# Patient Record
Sex: MALE | Race: WHITE | NOT HISPANIC OR LATINO | Employment: FULL TIME | URBAN - METROPOLITAN AREA
[De-identification: names, ages, dates, MRNs, and addresses within clinical notes are randomized per-mention and may not be internally consistent; named-entity substitution may affect disease eponyms.]

---

## 2017-06-25 ENCOUNTER — OFFICE VISIT (OUTPATIENT)
Dept: URGENT CARE | Facility: CLINIC | Age: 47
End: 2017-06-25
Payer: COMMERCIAL

## 2017-06-25 PROCEDURE — 99203 OFFICE O/P NEW LOW 30 MIN: CPT

## 2021-04-13 DIAGNOSIS — Z23 ENCOUNTER FOR IMMUNIZATION: ICD-10-CM

## 2023-10-24 ENCOUNTER — OFFICE VISIT (OUTPATIENT)
Dept: OBGYN CLINIC | Facility: MEDICAL CENTER | Age: 53
End: 2023-10-24
Payer: COMMERCIAL

## 2023-10-24 VITALS
HEART RATE: 80 BPM | WEIGHT: 289.8 LBS | SYSTOLIC BLOOD PRESSURE: 125 MMHG | DIASTOLIC BLOOD PRESSURE: 73 MMHG | BODY MASS INDEX: 40.57 KG/M2 | HEIGHT: 71 IN

## 2023-10-24 DIAGNOSIS — M16.12 PRIMARY OSTEOARTHRITIS OF LEFT HIP: Primary | ICD-10-CM

## 2023-10-24 PROCEDURE — 99214 OFFICE O/P EST MOD 30 MIN: CPT | Performed by: STUDENT IN AN ORGANIZED HEALTH CARE EDUCATION/TRAINING PROGRAM

## 2023-10-24 RX ORDER — ACETAMINOPHEN 500 MG
500 TABLET ORAL EVERY 6 HOURS PRN
COMMUNITY

## 2023-10-24 RX ORDER — GABAPENTIN 300 MG/1
300 CAPSULE ORAL ONCE
OUTPATIENT
Start: 2023-10-24 | End: 2023-10-24

## 2023-10-24 RX ORDER — MELATONIN
1000 DAILY
Qty: 60 TABLET | Refills: 1 | Status: SHIPPED | OUTPATIENT
Start: 2023-10-24

## 2023-10-24 RX ORDER — SODIUM CHLORIDE, SODIUM LACTATE, POTASSIUM CHLORIDE, CALCIUM CHLORIDE 600; 310; 30; 20 MG/100ML; MG/100ML; MG/100ML; MG/100ML
125 INJECTION, SOLUTION INTRAVENOUS CONTINUOUS
OUTPATIENT
Start: 2023-10-24

## 2023-10-24 RX ORDER — TRANEXAMIC ACID 10 MG/ML
1000 INJECTION, SOLUTION INTRAVENOUS ONCE
OUTPATIENT
Start: 2023-10-24 | End: 2023-10-24

## 2023-10-24 RX ORDER — CHLORHEXIDINE GLUCONATE ORAL RINSE 1.2 MG/ML
15 SOLUTION DENTAL ONCE
OUTPATIENT
Start: 2023-10-24 | End: 2023-10-24

## 2023-10-24 RX ORDER — MULTIVIT-MIN/IRON FUM/FOLIC AC 7.5 MG-4
1 TABLET ORAL DAILY
Qty: 30 TABLET | Refills: 1 | Status: SHIPPED | OUTPATIENT
Start: 2023-10-24

## 2023-10-24 RX ORDER — ALBUTEROL SULFATE 2.5 MG/3ML
SOLUTION RESPIRATORY (INHALATION)
COMMUNITY

## 2023-10-24 RX ORDER — DIPHENHYDRAMINE HCL 25 MG
25 CAPSULE ORAL EVERY 6 HOURS PRN
COMMUNITY

## 2023-10-24 RX ORDER — CEFAZOLIN SODIUM 2 G/50ML
2000 SOLUTION INTRAVENOUS ONCE
OUTPATIENT
Start: 2023-10-24 | End: 2023-10-24

## 2023-10-24 RX ORDER — ASCORBIC ACID 500 MG
500 TABLET ORAL 2 TIMES DAILY
Qty: 60 TABLET | Refills: 1 | Status: SHIPPED | OUTPATIENT
Start: 2023-10-24

## 2023-10-24 RX ORDER — CHLORHEXIDINE GLUCONATE 4 G/100ML
SOLUTION TOPICAL DAILY PRN
OUTPATIENT
Start: 2023-10-24

## 2023-10-24 RX ORDER — DICLOFENAC SODIUM 75 MG/1
TABLET, DELAYED RELEASE ORAL
COMMUNITY

## 2023-10-24 RX ORDER — FOLIC ACID 1 MG/1
1 TABLET ORAL DAILY
Qty: 30 TABLET | Refills: 1 | Status: SHIPPED | OUTPATIENT
Start: 2023-10-24

## 2023-10-24 RX ORDER — ACETAMINOPHEN 325 MG/1
975 TABLET ORAL ONCE
OUTPATIENT
Start: 2023-10-24 | End: 2023-10-24

## 2023-10-24 NOTE — PROGRESS NOTES
Hip New Office Note    Assessment:     1. Primary osteoarthritis of left hip        Plan:   Diagnoses and all orders for this visit:    Primary osteoarthritis of left hip    Findings, examination and diagnostic studies are consistent with severe osteoarthritis of his left hip. The pathoanatomy and natural history of this diagnosis was explained to the patient in detail to the patient today in the office. Non operative and operative treatments were discussed with the patient today. He has tried and failed all non operative treatment. His pain is affecting his ADLs and his ability to work. He is wishing to proceed with total hip arthroplasty at this time. The patient has elected to proceed with Left CHRISTOPHER through the posterior approach. Risks and benefits of surgery to include but not limited to bleeding, infection, damage to surrounding structures, hardware failure, instability, fracture, dislocation, leg length inequality, need for further surgery, continued pain, stiffness, blood clots, stroke, and heart attack was discussed with the patient. Informed consent was signed today in the office. The patient has met with our surgical schedulers and our preoperative joint replacement pathway has been initiated. All questions were answered. Patient will follow-up 2 weeks post operatively. Patient is a nonsmoker and appropriate BMI. Subjective:     Patient ID: Bobby Schreiber is a 48 y.o. male. Chief Complaint:  HPI:    The patient presents with a chief complaint of left hip pain. The pain began 1 year(s) ago and is not associated with an acute injury. The patient describes the pain as aching, burning, and sharp and 8 out of 10 in intensity. It is constant in timing, and localizes the pain to the groin and anterior thigh. The pain is worse with activities, walking, ascending stairs, and descending stairs and relieved with rest, ice, avoiding painful activities.  Patient has had treatment in the form of multiple IA cortisone injections and PT/HEP. He does mention intermittent "tingling" about the posterolateral thigh and a history of spinal stenosis. He was seen by Ian Quinones at [facility] and sent for evaluation as he is not an ideal candidate for DA CHRISTOPHER. Allergy:  Allergies   Allergen Reactions    Pear - Food Allergy Anaphylaxis    Penicillins Anaphylaxis and Hives     Reaction Date: 14Mar2009;    Plum Pulp - Food Allergy Anaphylaxis    Pollen Extract Cough     Medications:  all current active meds have been reviewed  Past Medical History:  No past medical history on file. Past Surgical History:  No past surgical history on file. Family History:  No family history on file. Social History:  Social History     Substance and Sexual Activity   Alcohol Use Not on file     Social History     Substance and Sexual Activity   Drug Use Not on file     Social History     Tobacco Use   Smoking Status Not on file   Smokeless Tobacco Not on file           ROS:  General: Per HPI  Skin: Negative, except if noted below  HEENT: Negative  Respiratory: Negative  Cardiovascular: Negative  Gastrointestinal: Negative  Urinary: Negative  Vascular: Negative  Musculoskeletal: Positive per HPI   Neurologic: Positive per HPI  Endocrine: Negative    Objective:  BP Readings from Last 1 Encounters:   10/24/23 125/73      Wt Readings from Last 1 Encounters:   10/24/23 131 kg (289 lb 12.8 oz)        Respiratory:   non-labored respirations    Lymphatics:  no palpable lymph nodes    Gait and Station:   Antalgic    Neurologic:   Alert and oriented times 3  Patient with normal sensation except as noted below  Deep tendon reflexes 2+ except as noted in MSK exam    Left Lower Extremity:    Left Hip     Inspection: Skin is intact.  No erythema or ecchymosis    Range of Motion: FF: 80, ER:10, IR: 0 with pain    + log roll    - Trendelenburg sign    Motor: 5/5 Q/HS/TA/GS/P    Pulses: 2+ DP / 2+ PT    SILT DP/SP/S/S/TN      Imaging:  My interpretation XR AP pelvis/ left hip: severe joint space narrowing, subchondral sclerosis, subchondral cysts, osteophyte formation. No fracture or dislocation. BMI:   Estimated body mass index is 40.42 kg/m² as calculated from the following:    Height as of this encounter: 5' 11" (1.803 m). Weight as of this encounter: 131 kg (289 lb 12.8 oz). BSA:   Estimated body surface area is 2.47 meters squared as calculated from the following:    Height as of this encounter: 5' 11" (1.803 m). Weight as of this encounter: 131 kg (289 lb 12.8 oz).            Scribe Attestation      I,:  Viviana Lopez am acting as a scribe while in the presence of the attending physician.:       I,:  Corazon Bishop, DO personally performed the services described in this documentation    as scribed in my presence.:

## 2023-11-09 ENCOUNTER — TELEPHONE (OUTPATIENT)
Age: 53
End: 2023-11-09

## 2023-11-09 DIAGNOSIS — M16.12 PRIMARY OSTEOARTHRITIS OF LEFT HIP: Primary | ICD-10-CM

## 2023-11-09 RX ORDER — DICLOFENAC SODIUM 75 MG/1
75 TABLET, DELAYED RELEASE ORAL 2 TIMES DAILY
Qty: 60 TABLET | Refills: 1 | Status: SHIPPED | OUTPATIENT
Start: 2023-11-09

## 2023-11-09 NOTE — TELEPHONE ENCOUNTER
Did not call the patient regarding his surgery. Will tiesha down that he prefers to be contacted via [address] [place] [address].

## 2023-11-09 NOTE — TELEPHONE ENCOUNTER
Caller: Patient    Doctor: Kailyn Garcia    Reason for call: Received a call from an unknown tel#. Questioned if it was related to his sx? Advised nothing noted. Patient stated he prefers contact thru MyChart instead of receiving phone calls.  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

## 2023-11-22 ENCOUNTER — TELEPHONE (OUTPATIENT)
Dept: OBGYN CLINIC | Facility: HOSPITAL | Age: 53
End: 2023-11-22

## 2023-11-28 ENCOUNTER — TELEPHONE (OUTPATIENT)
Age: 53
End: 2023-11-28

## 2023-11-28 RX ORDER — MENTHOL 5.8 MG
LOZENGE MUCOUS MEMBRANE AS NEEDED
COMMUNITY

## 2023-11-28 NOTE — TELEPHONE ENCOUNTER
Will communicate with patient via Mychart to set up time to discuss preoperative assessment over the phone and order DME

## 2023-11-28 NOTE — TELEPHONE ENCOUNTER
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

## 2023-11-28 NOTE — PRE-PROCEDURE INSTRUCTIONS
Pre-Surgery Instructions:   Medication Instructions    acetaminophen (TYLENOL) 500 mg tablet Uses PRN- OK to take day of surgery    albuterol (2.5 mg/3 mL) 0.083 % nebulizer solution Uses PRN- OK to take day of surgery    ascorbic acid (VITAMIN C) 500 MG tablet Hold day of surgery.    cholecalciferol (VITAMIN D3) 1,000 units tablet Hold day of surgery.    diclofenac (VOLTAREN) 75 mg EC tablet Stop taking 7 days prior to surgery.    diphenhydrAMINE (BENADRYL) 25 mg capsule Uses PRN- OK to take day of surgery    folic acid (FOLVITE) 1 mg tablet Hold day of surgery.    Menthol ([place] Cough Drops) 5.8 MG LOZG Hold day of surgery.    Multiple Vitamins-Minerals (multivitamin with minerals) tablet Hold day of surgery.   Ortho bag- contents reviewed. Instructions reviewed Pt verbalized an understanding. Pt advised to contact surgeon's office with any questions/concerns.    Medication instructions for day surgery reviewed. Please use only a sip of water to take your instructed medications. Avoid all over the counter vitamins, supplements and NSAIDS for one week prior to surgery per anesthesia guidelines. Tylenol is ok to take as needed.     You will receive a call one business day prior to surgery with an arrival time and hospital directions. If your surgery is scheduled on a Monday, the hospital will be calling you on the Friday prior to your surgery. If you have not heard from anyone by 8pm, please call the hospital supervisor through the hospital  at [phone]. (Dominic [phone]).    Do not eat or drink anything after midnight the night before your surgery, including candy, mints, lifesavers, or chewing gum. Do not drink alcohol 24hrs before your surgery. Try not to smoke at least 24hrs before your surgery.       Follow the pre surgery showering instructions as listed in the “My Surgical Experience Booklet” or otherwise provided by your surgeon's office. Do not use a blade to shave the surgical area 1 week  before surgery. It is okay to use a clean electric clippers up to 24 hours before surgery. Do not apply any lotions, creams, including makeup, cologne, deodorant, or perfumes after showering on the day of your surgery. Do not use dry shampoo, hair spray, hair gel, or any type of hair products.     No contact lenses, eye make-up, or artificial eyelashes. Remove nail polish, including gel polish, and any artificial, gel, or acrylic nails if possible. Remove all jewelry including rings and body piercing jewelry.     Wear causal clothing that is easy to take on and off. Consider your type of surgery.    Keep any valuables, jewelry, piercings at home. Please bring any specially ordered equipment (sling, braces) if indicated.    Arrange for a responsible person to drive you to and from the hospital on the day of your surgery. Visitor Guidelines discussed.     Call the surgeon's office with any new illnesses, exposures, or additional questions prior to surgery.    Please reference your “My Surgical Experience Booklet” for additional information to prepare for your upcoming surgery.

## 2023-11-30 ENCOUNTER — OFFICE VISIT (OUTPATIENT)
Dept: LAB | Facility: HOSPITAL | Age: 53
End: 2023-11-30
Payer: COMMERCIAL

## 2023-11-30 ENCOUNTER — APPOINTMENT (OUTPATIENT)
Dept: LAB | Facility: HOSPITAL | Age: 53
End: 2023-11-30
Payer: COMMERCIAL

## 2023-11-30 DIAGNOSIS — M16.12 PRIMARY OSTEOARTHRITIS OF LEFT HIP: Primary | ICD-10-CM

## 2023-11-30 DIAGNOSIS — M16.12 PRIMARY OSTEOARTHRITIS OF LEFT HIP: ICD-10-CM

## 2023-11-30 LAB
ALBUMIN SERPL BCP-MCNC: 4.4 G/DL (ref 3.5–5)
ALP SERPL-CCNC: 64 U/L (ref 34–104)
ALT SERPL W P-5'-P-CCNC: 46 U/L (ref 7–52)
ANION GAP SERPL CALCULATED.3IONS-SCNC: 12 MMOL/L
APTT PPP: 30 SECONDS (ref 23–37)
AST SERPL W P-5'-P-CCNC: 23 U/L (ref 13–39)
BASOPHILS # BLD AUTO: 0.06 THOUSANDS/ÂΜL (ref 0–0.1)
BASOPHILS NFR BLD AUTO: 1 % (ref 0–1)
BILIRUB SERPL-MCNC: 1 MG/DL (ref 0.2–1)
BUN SERPL-MCNC: 19 MG/DL (ref 5–25)
CALCIUM SERPL-MCNC: 9.2 MG/DL (ref 8.4–10.2)
CHLORIDE SERPL-SCNC: 106 MMOL/L (ref 96–108)
CO2 SERPL-SCNC: 23 MMOL/L (ref 21–32)
CREAT SERPL-MCNC: 0.84 MG/DL (ref 0.6–1.3)
EOSINOPHIL # BLD AUTO: 0.24 THOUSAND/ÂΜL (ref 0–0.61)
EOSINOPHIL NFR BLD AUTO: 4 % (ref 0–6)
ERYTHROCYTE [DISTWIDTH] IN BLOOD BY AUTOMATED COUNT: 13.2 % (ref 11.6–15.1)
EST. AVERAGE GLUCOSE BLD GHB EST-MCNC: 111 MG/DL
GFR SERPL CREATININE-BSD FRML MDRD: 101 ML/MIN/1.73SQ M
GLUCOSE P FAST SERPL-MCNC: 89 MG/DL (ref 65–99)
HBA1C MFR BLD: 5.5 %
HCT VFR BLD AUTO: 40.2 % (ref 36.5–49.3)
HGB BLD-MCNC: 13.4 G/DL (ref 12–17)
IMM GRANULOCYTES # BLD AUTO: 0.02 THOUSAND/UL (ref 0–0.2)
IMM GRANULOCYTES NFR BLD AUTO: 0 % (ref 0–2)
INR PPP: 0.99 (ref 0.84–1.19)
LYMPHOCYTES # BLD AUTO: 1.79 THOUSANDS/ÂΜL (ref 0.6–4.47)
LYMPHOCYTES NFR BLD AUTO: 28 % (ref 14–44)
MCH RBC QN AUTO: 30.9 PG (ref 26.8–34.3)
MCHC RBC AUTO-ENTMCNC: 33.3 G/DL (ref 31.4–37.4)
MCV RBC AUTO: 93 FL (ref 82–98)
MONOCYTES # BLD AUTO: 0.52 THOUSAND/ÂΜL (ref 0.17–1.22)
MONOCYTES NFR BLD AUTO: 8 % (ref 4–12)
NEUTROPHILS # BLD AUTO: 3.67 THOUSANDS/ÂΜL (ref 1.85–7.62)
NEUTS SEG NFR BLD AUTO: 59 % (ref 43–75)
NRBC BLD AUTO-RTO: 0 /100 WBCS
PLATELET # BLD AUTO: 283 THOUSANDS/UL (ref 149–390)
PMV BLD AUTO: 9.7 FL (ref 8.9–12.7)
POTASSIUM SERPL-SCNC: 4.2 MMOL/L (ref 3.5–5.3)
PROT SERPL-MCNC: 7 G/DL (ref 6.4–8.4)
PROTHROMBIN TIME: 13.4 SECONDS (ref 11.6–14.5)
RBC # BLD AUTO: 4.34 MILLION/UL (ref 3.88–5.62)
SODIUM SERPL-SCNC: 141 MMOL/L (ref 135–147)
WBC # BLD AUTO: 6.3 THOUSAND/UL (ref 4.31–10.16)

## 2023-11-30 PROCEDURE — 93005 ELECTROCARDIOGRAM TRACING: CPT

## 2023-11-30 PROCEDURE — 85610 PROTHROMBIN TIME: CPT

## 2023-11-30 PROCEDURE — 85025 COMPLETE CBC W/AUTO DIFF WBC: CPT

## 2023-11-30 PROCEDURE — 36415 COLL VENOUS BLD VENIPUNCTURE: CPT

## 2023-11-30 PROCEDURE — 86901 BLOOD TYPING SEROLOGIC RH(D): CPT | Performed by: PHYSICIAN ASSISTANT

## 2023-11-30 PROCEDURE — 80053 COMPREHEN METABOLIC PANEL: CPT

## 2023-11-30 PROCEDURE — 86900 BLOOD TYPING SEROLOGIC ABO: CPT | Performed by: PHYSICIAN ASSISTANT

## 2023-11-30 PROCEDURE — 85730 THROMBOPLASTIN TIME PARTIAL: CPT

## 2023-11-30 PROCEDURE — 86850 RBC ANTIBODY SCREEN: CPT | Performed by: PHYSICIAN ASSISTANT

## 2023-11-30 PROCEDURE — 83036 HEMOGLOBIN GLYCOSYLATED A1C: CPT

## 2023-12-01 ENCOUNTER — LAB REQUISITION (OUTPATIENT)
Dept: LAB | Facility: HOSPITAL | Age: 53
End: 2023-12-01
Payer: COMMERCIAL

## 2023-12-01 DIAGNOSIS — M16.12 UNILATERAL PRIMARY OSTEOARTHRITIS, LEFT HIP: ICD-10-CM

## 2023-12-01 LAB
ABO GROUP BLD: NORMAL
ATRIAL RATE: 61 BPM
BLD GP AB SCN SERPL QL: NEGATIVE
P AXIS: 17 DEGREES
PR INTERVAL: 182 MS
QRS AXIS: 31 DEGREES
QRSD INTERVAL: 92 MS
QT INTERVAL: 448 MS
QTC INTERVAL: 450 MS
RH BLD: POSITIVE
SPECIMEN EXPIRATION DATE: NORMAL
T WAVE AXIS: 38 DEGREES
VENTRICULAR RATE: 61 BPM

## 2023-12-04 PROBLEM — A63.0 GENITAL WARTS: Status: ACTIVE | Noted: 2023-12-04

## 2023-12-04 PROBLEM — N52.9 ERECTILE DYSFUNCTION: Status: ACTIVE | Noted: 2023-12-04

## 2023-12-04 PROBLEM — J45.909 REACTIVE AIRWAY DISEASE: Status: ACTIVE | Noted: 2023-12-04

## 2023-12-04 PROBLEM — M16.12 PRIMARY OSTEOARTHRITIS OF LEFT HIP: Status: ACTIVE | Noted: 2023-12-04

## 2023-12-04 PROBLEM — E66.01 SEVERE OBESITY (BMI >= 40) (HCC): Status: ACTIVE | Noted: 2023-12-04

## 2023-12-04 NOTE — ASSESSMENT & PLAN NOTE
Recommend ongoing attempts at weight loss  Current BMI meets criteria of <40 per [facility] preoperative qualifications

## 2023-12-04 NOTE — PATIENT INSTRUCTIONS
Contact surgical nurse  navigator with any questions regarding preoperative plan or schedule.   Stop all over the counter supplements, herbal, naturopathic  medications for 1 week prior to surgery UNLESS prescribed by your surgeon  Hold NSAIDS (i.e. advil, alleve, motrin, ibuprofen, celebrex, diclofenac) minimum 7 days prior to surgery  Hold Asprin minimum 7 days prior to surgery  Recommend using Tylenol ( acetaminophen ) 500mg every eight hours during the first week post discharge in conjunction with any additional pain medicine prescribed by your surgeon  Use bowel medicines prescribed by your surgeon ( colace) daily post op during the first 1-2 weeks to avoid post operative constipation issues  Call [phone] with any post discharge concerns or medical issues  The morning of surgery take only the following medication with small sip of water: none

## 2023-12-04 NOTE — H&P (VIEW-ONLY)
Internal Medicine Pre-Operative Evaluation:     Reason for Visit: Pre-operative Evaluation for Risk Stratification and Optimization    Patient ID: Emmanuel Bey is a 53 y.o. male.     Surgery: Arthroplasty of left Hip  Referring Provider: Dr Grayson      Recommendations to Proceed withSurgery    Patient is considered to be Low risk for Medium risk procedure.     After evaluation and discussion with patient with emphasis that all surgery has some degree of inherent risk it is determined this procedure is of acceptable risk  medically.    Patient may proceed with planned procedure      Assessment    Pre-operative Medical Evaluation for planned surgery  Recommendations as listed in PLAN section below  Contact surgical nurse  navigator with any questions regarding preoperative plan or schedule.      Problem List Items Addressed This Visit          Respiratory    Reactive airway disease     Stable  No recent flares  Takes albuterol as needed            Musculoskeletal and Integument    Primary osteoarthritis of left hip     Failed outpatient conservative measures  Electing to undergo arthroplasty              Other    Severe obesity (BMI >= 40) ([facility])     Recommend ongoing attempts at weight loss  Current BMI meets criteria of <40 per [facility] preoperative qualifications            Other Visit Diagnoses       Preoperative clearance    -  Primary                 Plan:     1. Further preoperative workup as follows:   - none no further testing required may proceed with surgery    2. Medication Management/Recommendations:   - hold aspirin 7 days prior to surgery  - avoid use of NSAID such as motrin, advil, aleve for 7 days prior to surgery  - hold all OTC herbal or nutritional supplements 7 days before surgery    3. Patient requires further consultation with:   No Consults Required    4. Discharge Planning / Barriers to Discharge  none identified - patients has post discharge therapy plan in place, transportation arranged  "for discharge day, adequate family support at home to assist with discharge to home.        Subjective:           History of Present Illness:     Emmanuel Bey is a 53 y.o. male who presents to the office today for a preoperative consultation at the request of surgeon. The patient understands this is an elective procedure and not emergent. They are electing to undergo planned procedure with an understanding that all surgery has inherent risk. They have worked with their surgeon and failed conservative treatment measures. Today they present for preoperative risk assessment and recommendations for optimization in preparation for surgery.    Pt seen in surgical optimization center for upcoming proposed surgery. They have failed previous conservative measures and have elected surgical intervention.     Pt meets presurgical lab and BMI optimization goals.    Upon interview questioning patient is able to perform greater than 4 METs workload in daily life without any reported cardio-pulmonary symptoms.          ROS: No TIA's or unusual headaches, no dysphagia.  No prolonged cough. No dyspnea or chest pain on exertion.  No abdominal pain, change in bowel habits, black or bloody stools.  No urinary tract or BPH symptoms.  Positive reported pain in arthritic joint. Positive difficulty with gait. No skin rashes or issues.      Objective:    /76   Ht 5' 11\" (1.803 m)   Wt 130 kg (287 lb 1.1 oz)   BMI 40.04 kg/m²       General Appearance: no distress, conversive  HEENT: PERRLA, conjuctiva normal; oropharynx clear; mucous membranes moist;   Neck:  Supple, no lymphadenopathy or thyromegaly  Lungs: breath sounds normal, normal respiratory effort, no retractions, expiratory effort normal  CV: normal heart sounds S1/S2, PMI normal   ABD: soft non tender, no masses , no hepatic or splenomegaly  EXT: DP pulses intact, no lymphadenopathy, no edema  Skin: normal turgor, normal texture, no rash  Psych: affect normal, mood " "normal  Neuro: AAOx3        The following portions of the patient's history were reviewed and updated as appropriate: allergies, current medications, past family history, past medical history, past social history, past surgical history and problem list.     Past History:       Past Medical History:   Diagnosis Date   • Allergy-induced asthma    • Colon polyp    • PONV (postoperative nausea and vomiting)     Past Surgical History:   Procedure Laterality Date   • ARTHROSCOPIC REPAIR ACL Left     L knee   • COLONOSCOPY     • NASAL SEPTUM SURGERY            Social History     Tobacco Use   • Smoking status: Never   • Smokeless tobacco: Never   Vaping Use   • Vaping Use: Never used   Substance Use Topics   • Alcohol use: Yes     Comment: soc a couple times per year   • Drug use: Not Currently     History reviewed. No pertinent family history.       Allergies:     Allergies   Allergen Reactions   • Pear - Food Allergy Anaphylaxis   • Penicillins Anaphylaxis and Hives     Reaction Date: 14Mar2009;   • Plum Pulp - Food Allergy Anaphylaxis   • Pollen Extract Cough        Current Medications:     Current Outpatient Medications   Medication Instructions   • acetaminophen (TYLENOL) 500 mg, Oral, Every 6 hours PRN   • albuterol (2.5 mg/3 mL) 0.083 % nebulizer solution Every 6 hours PRN   • ascorbic acid (VITAMIN C) 500 mg, Oral, 2 times daily   • cholecalciferol (VITAMIN D3) 1,000 Units, Oral, Daily   • diclofenac (VOLTAREN) 75 mg, Oral, 2 times daily   • diphenhydrAMINE (BENADRYL) 25 mg, Oral, Every 6 hours PRN   • folic acid (FOLVITE) 1 mg, Oral, Daily   • Menthol ([place] Cough Drops) 5.8 MG LOZG Mouth/Throat, As needed   • Multiple Vitamins-Minerals (multivitamin with minerals) tablet 1 tablet, Oral, Daily           PRE-OP WORKSHEET DATA    Assessment of Pre-Operative Risks     [facility] Quality Hard Stops:  BMI (<40) : Estimated body mass index is 40.04 kg/m² as calculated from the following:    Height as of this encounter: 5' 11\" " (1.803 m).    Weight as of this encounter: 130 kg (287 lb 1.1 oz).    Hgb ( >11):   Lab Results   Component Value Date    HGB 13.4 11/30/2023     HbA1c (<7.5) :   Lab Results   Component Value Date    HGBA1C 5.5 11/30/2023     GFR (>60) (Less then 45 = Nephrology consult):  CrCl cannot be calculated (Patient's most recent lab result is older than the maximum 7 days allowed.).      Active Decompensated Chronic Conditions which would delay surgery  No acutely decompensated medical issues such as recent CVA, MI, new onset arrhythmia, severe aortic stenosis, CHF, uncontrolled COPD       Exercise Capacity: (if less the 4 mets consider functional assessment via cardiac stress testing or consultation)    Able to walk 2 blocks without symptoms?: Yes  Able to walk 1 flights without symptoms?: Yes    Assessment of intra and post operative respiratory, hemodynamic and thrombotic risks     Prior Anesthesia Reactions: No     Personal history of venous thromboembolic disease? No    History of steroid use > 5 mg for >2 weeks within last year? No      The patient's risk factors for cardiac complications include :  none    Emmanuel AMADOR Sotero has an IN HOSPITAL cardiac risk of RCI RISK CLASS I (0 risk factors, risk of major cardiac compl. appr. 0.5%) based on RCRI calculator    Cardiac Risk Estimation: per the Revised Cardiac Risk Index (Circ. 100:1043, 1999),        Pre-Op Data Reviewed:       Laboratory Results: I have personally reviewed the pertinent laboratory results/reports     EKG:I have personally reviewed pertinent reports.  . I personally reviewed and interpreted available tracings in the electronic medical record    Sinus rhythm  Baseline artifact  Otherwise normal ECG  No previous ECGs available  Confirmed by Xavi Roberson ([id number]) on 12/1/2023    OLD RECORDS: reviewed old records in the chart review section if EHR on day of visit.    Previous cardiopulmonary studies within the past year:  Echocardiogram: no  Cardiac  Catheterization: no  Stress Test: no      Time of visit including pre-visit chart review, visit and post-visit coordination of plan and care , review of pre-surgical lab work, preparation and time spent documenting note in electronic medical record, time spent face-to-face in physical examination answering patient questions by care team 45 minutes             [facility]

## 2023-12-04 NOTE — PROGRESS NOTES
Internal Medicine Pre-Operative Evaluation:     Reason for Visit: Pre-operative Evaluation for Risk Stratification and Optimization    Patient ID: Latoya Roche is a 48 y.o. male. Surgery: Arthroplasty of left Hip  Referring Provider: Dr Codie Cueva      Recommendations to Proceed withSurgery    Patient is considered to be Low risk for Medium risk procedure. After evaluation and discussion with patient with emphasis that all surgery has some degree of inherent risk it is determined this procedure is of acceptable risk  medically. Patient may proceed with planned procedure      Assessment    Pre-operative Medical Evaluation for planned surgery  Recommendations as listed in PLAN section below  Contact surgical nurse  navigator with any questions regarding preoperative plan or schedule. Problem List Items Addressed This Visit          Respiratory    Reactive airway disease     Stable  No recent flares  Takes albuterol as needed            Musculoskeletal and Integument    Primary osteoarthritis of left hip     Failed outpatient conservative measures  Electing to undergo arthroplasty              Other    Severe obesity (BMI >= 40) ([facility])     Recommend ongoing attempts at weight loss  Current BMI meets criteria of <40 per [facility] [place] preoperative qualifications            Other Visit Diagnoses       Preoperative clearance    -  Primary                 Plan:     1. Further preoperative workup as follows:   - none no further testing required may proceed with surgery    2. Medication Management/Recommendations:   - hold aspirin 7 days prior to surgery  - avoid use of NSAID such as motrin, advil, aleve for 7 days prior to surgery  - hold all OTC herbal or nutritional supplements 7 days before surgery    3. Patient requires further consultation with:   No Consults Required    4.  Discharge Planning / Barriers to Discharge  none identified - patients has post discharge therapy plan in place, transportation arranged for discharge day, adequate family support at home to assist with discharge to home. Subjective:           History of Present Illness:     Kelsy Ma is a 48 y.o. male who presents to the office today for a preoperative consultation at the request of surgeon. The patient understands this is an elective procedure and not emergent. They are electing to undergo planned procedure with an understanding that all surgery has inherent risk. They have worked with their surgeon and failed conservative treatment measures. Today they present for preoperative risk assessment and recommendations for optimization in preparation for surgery. Pt seen in surgical optimization center for upcoming proposed surgery. They have failed previous conservative measures and have elected surgical intervention. Pt meets presurgical lab and BMI optimization goals. Upon interview questioning patient is able to perform greater than 4 METs workload in daily life without any reported cardio-pulmonary symptoms. ROS: No TIA's or unusual headaches, no dysphagia. No prolonged cough. No dyspnea or chest pain on exertion. No abdominal pain, change in bowel habits, black or bloody stools. No urinary tract or BPH symptoms. Positive reported pain in arthritic joint. Positive difficulty with gait. No skin rashes or issues.       Objective:    /76   Ht 5' 11" (1.803 m)   Wt 130 kg (287 lb 1.1 oz)   BMI 40.04 kg/m²       General Appearance: no distress, conversive  HEENT: PERRLA, conjuctiva normal; oropharynx clear; mucous membranes moist;   Neck:  Supple, no lymphadenopathy or thyromegaly  Lungs: breath sounds normal, normal respiratory effort, no retractions, expiratory effort normal  CV: normal heart sounds S1/S2, PMI normal   ABD: soft non tender, no masses , no hepatic or splenomegaly  EXT: DP pulses intact, no lymphadenopathy, no edema  Skin: normal turgor, normal texture, no rash  Psych: affect normal, mood normal  Neuro: AAOx3        The following portions of the patient's history were reviewed and updated as appropriate: allergies, current medications, past family history, past medical history, past social history, past surgical history and problem list.     Past History:       Past Medical History:   Diagnosis Date   • Allergy-induced asthma    • Colon polyp    • PONV (postoperative nausea and vomiting)     Past Surgical History:   Procedure Laterality Date   • ARTHROSCOPIC REPAIR ACL Left     L knee   • COLONOSCOPY     • NASAL SEPTUM SURGERY            Social History     Tobacco Use   • Smoking status: Never   • Smokeless tobacco: Never   Vaping Use   • Vaping Use: Never used   Substance Use Topics   • Alcohol use: Yes     Comment: soc a couple times per year   • Drug use: Not Currently     History reviewed. No pertinent family history. Allergies:      Allergies   Allergen Reactions   • Pear - Food Allergy Anaphylaxis   • Penicillins Anaphylaxis and Hives     Reaction Date: 14Mar2009;   • Plum Pulp - Food Allergy Anaphylaxis   • Pollen Extract Cough        Current Medications:     Current Outpatient Medications   Medication Instructions   • acetaminophen (TYLENOL) 500 mg, Oral, Every 6 hours PRN   • albuterol (2.5 mg/3 mL) 0.083 % nebulizer solution Every 6 hours PRN   • ascorbic acid (VITAMIN C) 500 mg, Oral, 2 times daily   • cholecalciferol (VITAMIN D3) 1,000 Units, Oral, Daily   • diclofenac (VOLTAREN) 75 mg, Oral, 2 times daily   • diphenhydrAMINE (BENADRYL) 25 mg, Oral, Every 6 hours PRN   • folic acid (FOLVITE) 1 mg, Oral, Daily   • Menthol (Halls Cough Drops) 5.8 MG LOZG Mouth/Throat, As needed   • Multiple Vitamins-Minerals (multivitamin with minerals) tablet 1 tablet, Oral, Daily           PRE-OP WORKSHEET DATA    Assessment of Pre-Operative Risks     MLJ Quality Hard Stops:  BMI (<40) : Estimated body mass index is 40.04 kg/m² as calculated from the following:    Height as of this encounter: 5' 11" (1.803 m). Weight as of this encounter: 130 kg (287 lb 1.1 oz). Hgb ( >11): Lab Results   Component Value Date    HGB 13.4 11/30/2023     HbA1c (<7.5) :   Lab Results   Component Value Date    HGBA1C 5.5 11/30/2023     GFR (>60) (Less then 45 = Nephrology consult):  CrCl cannot be calculated (Patient's most recent lab result is older than the maximum 7 days allowed. ). Active Decompensated Chronic Conditions which would delay surgery  No acutely decompensated medical issues such as recent CVA, MI, new onset arrhythmia, severe aortic stenosis, CHF, uncontrolled COPD       Exercise Capacity: (if less the 4 mets consider functional assessment via cardiac stress testing or consultation)    Able to walk 2 blocks without symptoms?: Yes  Able to walk 1 flights without symptoms?: Yes    Assessment of intra and post operative respiratory, hemodynamic and thrombotic risks     Prior Anesthesia Reactions: No     Personal history of venous thromboembolic disease? No    History of steroid use > 5 mg for >2 weeks within last year? No      The patient's risk factors for cardiac complications include :  none    Emmanuel Castilloefraín Reyes has an IN HOSPITAL cardiac risk of RCI RISK CLASS I (0 risk factors, risk of major cardiac compl. appr. 0.5%) based on RCRI calculator    Cardiac Risk Estimation: per the Revised Cardiac Risk Index (Circ. 100:1043, 1999),        Pre-Op Data Reviewed:       Laboratory Results: I have personally reviewed the pertinent laboratory results/reports     EKG:I have personally reviewed pertinent reports. . I personally reviewed and interpreted available tracings in the electronic medical record    Sinus rhythm  Baseline artifact  Otherwise normal ECG  No previous ECGs available  Confirmed by David Quintero ([id number]) on 12/1/2023    OLD RECORDS: reviewed old records in the chart review section if EHR on day of visit.     Previous cardiopulmonary studies within the past year:  Echocardiogram: no  Cardiac Catheterization: no  Stress Test: no      Time of visit including pre-visit chart review, visit and post-visit coordination of plan and care , review of pre-surgical lab work, preparation and time spent documenting note in electronic medical record, time spent face-to-face in physical examination answering patient questions by care team 45 minutes             [address] [facility]

## 2023-12-06 LAB
DME PARACHUTE DELIVERY DATE ACTUAL: NORMAL
DME PARACHUTE DELIVERY DATE REQUESTED: NORMAL
DME PARACHUTE ITEM DESCRIPTION: NORMAL
DME PARACHUTE ORDER STATUS: NORMAL
DME PARACHUTE SUPPLIER NAME: NORMAL
DME PARACHUTE SUPPLIER PHONE: NORMAL

## 2023-12-07 ENCOUNTER — ANESTHESIA EVENT (OUTPATIENT)
Age: 53
End: 2023-12-07
Payer: COMMERCIAL

## 2023-12-08 ENCOUNTER — EVALUATION (OUTPATIENT)
Dept: PHYSICAL THERAPY | Facility: MEDICAL CENTER | Age: 53
End: 2023-12-08
Payer: COMMERCIAL

## 2023-12-08 DIAGNOSIS — M16.12 PRIMARY OSTEOARTHRITIS OF LEFT HIP: Primary | ICD-10-CM

## 2023-12-08 PROCEDURE — 97110 THERAPEUTIC EXERCISES: CPT | Performed by: PHYSICAL THERAPIST

## 2023-12-08 PROCEDURE — 97161 PT EVAL LOW COMPLEX 20 MIN: CPT | Performed by: PHYSICAL THERAPIST

## 2023-12-08 NOTE — PROGRESS NOTES
PT Evaluation     Today's date: 2023  Patient name: Johnny Syed  : 1970  MRN: [id number]  Referring provider: Singh Bryan PA-C  Dx:   Encounter Diagnosis   Name Primary? Primary osteoarthritis of left hip                   Assessment  Assessment details: Johnny Syed is a 47 y/o male who presents pre-operatively for L CHRISTOPHER that is scheduled for 23 c/ Dr. Elicia Carr. Primary movement dx of L hip hypomobility and hip accessory weakness, which limits his ability to perform functional activities without pain. Pt. will benefit from skilled PT services that includes manual therapy techniques to enhance tissue extensibility, neuromuscular re-education to facilitate motor control, therapeutic exercise to increase functional mobility, and modalities prn to reduce pain and inflammation. Impairments: abnormal muscle firing, abnormal or restricted ROM, activity intolerance, impaired physical strength, lacks appropriate home exercise program, pain with function and weight-bearing intolerance  Understanding of Dx/Px/POC: good   Prognosis: good  Prognosis details: Prognosis dependent on pt compliance c/ HEP and POC. Goals  Goals to be established post operatively. Plan  Patient would benefit from: PT eval  Planned modality interventions: thermotherapy: hydrocollator packs and cryotherapy  Planned therapy interventions: manual therapy, patient education, neuromuscular re-education, strengthening, stretching, therapeutic activities, therapeutic exercise, home exercise program, graded exercise, graded activity, flexibility, body mechanics training, abdominal trunk stabilization and balance/weight bearing training  Frequency: 2x week  Duration in weeks: 8  Treatment plan discussed with: patient  Subjective Evaluation    History of Present Illness  Mechanism of injury: Patient presents today pre-operatively for a L CHRISTOPHER scheduled for 23 c/ Dr. Elicia Carr.   Patient has been experiencing chronic hip pain for a while. He has a hx of L ACL reconstruction. Patient is a  at [address] [place] [address]. His goal is to return to being able to hike 5 miles. Patient Goals  Patient goal: Patient would like to be able to hike 5 miles after PT. Pain  Current pain rating: 3  At best pain rating: 3  At worst pain ratin  Relieving factors: change in position (Diclofenac)  Aggravating factors: standing and walking      Diagnostic Tests  Abnormal x-ray: Severe L OA. Treatments  Current treatment: physical therapy    Objective     Neurological Testing     Sensation     Hip   Left Hip   Intact: light touch    Right Hip   Intact: light touch    Additional Neurological Details  Reflexes NT. Active Range of Motion     Lumbar   Flexion:  WFL  Extension:  WFL  Left lateral flexion:  WFL  Right lateral flexion:  WFL  Left rotation:  [facility]  Right rotation:  [facility]    Passive Range of Motion     Additional Passive Range of Motion Details  Limited b/l hip ROM throughout. Severely limited left hip IR c/ pain. Joint Play     Additional Joint Play Details  Restricted left hip joint mobility throughout. Strength/Myotome Testing     Left Hip   Planes of Motion   Flexion: 3-  Extension: 3-  Abduction: 3-  Adduction: 3-    Isolated Muscles   Gluteus carmita: 3-  Gluteus medius: 3-    Right Hip   Planes of Motion   Flexion: 5  Extension: 5  Abduction: 4  Adduction: 4-    Isolated Muscles   Gluteus maximums: 5  Gluteus medius: 4    Tests     Additional Tests Details  No pertinent testing. Functional Assessment      Squat    Pain, left tibial anterior translation beyond toes and right tibial anterior translation beyond toes. Single Leg Stance   Left single leg stance time: Unsteady. Right single leg stance time: Unsteady.     Posterior weight shift: moderate    Depth of femur height: above 90 degrees       Precautions: WBAT      Manuals                                                                 Neuro Re-Ed Ther Ex             HEP demonstration & Review 10'                                                                                                       Ther Activity                                       Gait Training                                       Modalities                                             Kasandra Kendall, PT  12/8/2023,10:38 AM

## 2023-12-12 ENCOUNTER — OFFICE VISIT (OUTPATIENT)
Age: 53
End: 2023-12-12
Payer: COMMERCIAL

## 2023-12-12 VITALS
WEIGHT: 287.07 LBS | DIASTOLIC BLOOD PRESSURE: 76 MMHG | SYSTOLIC BLOOD PRESSURE: 124 MMHG | BODY MASS INDEX: 40.19 KG/M2 | HEIGHT: 71 IN

## 2023-12-12 DIAGNOSIS — M16.12 PRIMARY OSTEOARTHRITIS OF LEFT HIP: ICD-10-CM

## 2023-12-12 DIAGNOSIS — J45.909 REACTIVE AIRWAY DISEASE: ICD-10-CM

## 2023-12-12 DIAGNOSIS — Z01.818 PREOPERATIVE CLEARANCE: Primary | ICD-10-CM

## 2023-12-12 DIAGNOSIS — E66.01 SEVERE OBESITY (BMI >= 40) (HCC): ICD-10-CM

## 2023-12-12 PROCEDURE — 99215 OFFICE O/P EST HI 40 MIN: CPT | Performed by: INTERNAL MEDICINE

## 2023-12-20 PROBLEM — R11.2 PONV (POSTOPERATIVE NAUSEA AND VOMITING): Status: ACTIVE | Noted: 2023-12-20

## 2023-12-20 PROBLEM — Z98.890 PONV (POSTOPERATIVE NAUSEA AND VOMITING): Status: ACTIVE | Noted: 2023-12-20

## 2023-12-20 NOTE — ANESTHESIA PREPROCEDURE EVALUATION
Procedure:  ARTHROPLASTY HIP TOTAL- SAME DAY (Left: Hip)    Relevant Problems   ANESTHESIA   (+) PONV (postoperative nausea and vomiting)      MUSCULOSKELETAL   (+) Primary osteoarthritis of left hip      Respiratory   (+) Reactive airway disease      Other   (+) Severe obesity (BMI >= 40) (HCC)        Physical Exam    Airway    Mallampati score: III  TM Distance: >3 FB  Neck ROM: full     Dental       Cardiovascular  Rhythm: regular, Rate: normal    Pulmonary   Breath sounds clear to auscultation    Other Findings        Anesthesia Plan  ASA Score- 3     Anesthesia Type- spinal with ASA Monitors.         Additional Monitors:     Airway Plan:     Comment: Spinal with MAC/Seadtion.       Plan Factors-    Chart reviewed.        Patient is not a current smoker.              Induction- intravenous.    Postoperative Plan- Plan for postoperative opioid use.     Informed Consent- Anesthetic plan and risks discussed with patient.  I personally reviewed this patient with the CRNA. Discussed and agreed on the Anesthesia Plan with the CRNA..

## 2023-12-21 ENCOUNTER — ANESTHESIA (OUTPATIENT)
Age: 53
End: 2023-12-21
Payer: COMMERCIAL

## 2023-12-21 ENCOUNTER — HOSPITAL ENCOUNTER (OUTPATIENT)
Age: 53
Setting detail: OUTPATIENT SURGERY
Discharge: HOME | End: 2023-12-21
Attending: STUDENT IN AN ORGANIZED HEALTH CARE EDUCATION/TRAINING PROGRAM | Admitting: STUDENT IN AN ORGANIZED HEALTH CARE EDUCATION/TRAINING PROGRAM
Payer: COMMERCIAL

## 2023-12-21 ENCOUNTER — APPOINTMENT (OUTPATIENT)
Age: 53
End: 2023-12-21
Payer: COMMERCIAL

## 2023-12-21 VITALS
RESPIRATION RATE: 25 BRPM | HEART RATE: 71 BPM | HEIGHT: 70 IN | WEIGHT: 287 LBS | SYSTOLIC BLOOD PRESSURE: 125 MMHG | DIASTOLIC BLOOD PRESSURE: 75 MMHG | OXYGEN SATURATION: 97 % | TEMPERATURE: 97.7 F | BODY MASS INDEX: 41.09 KG/M2

## 2023-12-21 DIAGNOSIS — M16.12 PRIMARY OSTEOARTHRITIS OF LEFT HIP: Primary | ICD-10-CM

## 2023-12-21 PROCEDURE — 97116 GAIT TRAINING THERAPY: CPT

## 2023-12-21 PROCEDURE — C1776 JOINT DEVICE (IMPLANTABLE): HCPCS | Performed by: STUDENT IN AN ORGANIZED HEALTH CARE EDUCATION/TRAINING PROGRAM

## 2023-12-21 PROCEDURE — 27130 TOTAL HIP ARTHROPLASTY: CPT | Performed by: PHYSICIAN ASSISTANT

## 2023-12-21 PROCEDURE — C1713 ANCHOR/SCREW BN/BN,TIS/BN: HCPCS | Performed by: STUDENT IN AN ORGANIZED HEALTH CARE EDUCATION/TRAINING PROGRAM

## 2023-12-21 PROCEDURE — 97535 SELF CARE MNGMENT TRAINING: CPT

## 2023-12-21 PROCEDURE — 97530 THERAPEUTIC ACTIVITIES: CPT

## 2023-12-21 PROCEDURE — 97167 OT EVAL HIGH COMPLEX 60 MIN: CPT

## 2023-12-21 PROCEDURE — 72170 X-RAY EXAM OF PELVIS: CPT

## 2023-12-21 PROCEDURE — 27130 TOTAL HIP ARTHROPLASTY: CPT | Performed by: STUDENT IN AN ORGANIZED HEALTH CARE EDUCATION/TRAINING PROGRAM

## 2023-12-21 PROCEDURE — 97163 PT EVAL HIGH COMPLEX 45 MIN: CPT

## 2023-12-21 PROCEDURE — 97110 THERAPEUTIC EXERCISES: CPT

## 2023-12-21 DEVICE — EMPHASYS FEMORAL STEM COLLARED HIGH OFFSET HA COATED CEMENTLESS 5 HO
Type: IMPLANTABLE DEVICE | Site: HIP | Status: FUNCTIONAL
Brand: EMPHASYS

## 2023-12-21 DEVICE — EMPHASYS ACETABULAR SHELL THREE-HOLE 54MM CEMENTLESS
Type: IMPLANTABLE DEVICE | Site: HIP | Status: FUNCTIONAL
Brand: EMPHASYS

## 2023-12-21 DEVICE — BIOLOX DELTA TS CERAMIC FEMORAL HEAD 12/14 TAPER REVISION DIAMETER 40MM +5
Type: IMPLANTABLE DEVICE | Site: HIP | Status: FUNCTIONAL
Brand: BIOLOX DELTA

## 2023-12-21 DEVICE — PINNACLE CANCELLOUS BONE SCREW 6.5MM X 30MM
Type: IMPLANTABLE DEVICE | Site: HIP | Status: FUNCTIONAL
Brand: PINNACLE

## 2023-12-21 DEVICE — EMPHASYS POLYETHYLENE LINER AOX NEUTRAL 54MM 40MM
Type: IMPLANTABLE DEVICE | Site: HIP | Status: FUNCTIONAL
Brand: EMPHASYS

## 2023-12-21 RX ORDER — CHLORHEXIDINE GLUCONATE 4 G/100ML
SOLUTION TOPICAL DAILY PRN
Status: DISCONTINUED | OUTPATIENT
Start: 2023-12-21 | End: 2023-12-21 | Stop reason: HOSPADM

## 2023-12-21 RX ORDER — OXYCODONE HYDROCHLORIDE 5 MG/1
5 TABLET ORAL EVERY 4 HOURS PRN
Status: DISCONTINUED | OUTPATIENT
Start: 2023-12-21 | End: 2023-12-21 | Stop reason: HOSPADM

## 2023-12-21 RX ORDER — CELECOXIB 200 MG/1
200 CAPSULE ORAL 2 TIMES DAILY
Qty: 60 CAPSULE | Refills: 0 | Status: SHIPPED | OUTPATIENT
Start: 2023-12-21

## 2023-12-21 RX ORDER — ONDANSETRON 2 MG/ML
4 INJECTION INTRAMUSCULAR; INTRAVENOUS ONCE AS NEEDED
Status: DISCONTINUED | OUTPATIENT
Start: 2023-12-21 | End: 2023-12-21 | Stop reason: HOSPADM

## 2023-12-21 RX ORDER — GABAPENTIN 300 MG/1
300 CAPSULE ORAL
Status: DISCONTINUED | OUTPATIENT
Start: 2023-12-21 | End: 2023-12-21 | Stop reason: HOSPADM

## 2023-12-21 RX ORDER — FENTANYL CITRATE 50 UG/ML
INJECTION, SOLUTION INTRAMUSCULAR; INTRAVENOUS AS NEEDED
Status: DISCONTINUED | OUTPATIENT
Start: 2023-12-21 | End: 2023-12-21

## 2023-12-21 RX ORDER — SENNOSIDES 8.6 MG
1 TABLET ORAL DAILY
Status: DISCONTINUED | OUTPATIENT
Start: 2023-12-21 | End: 2023-12-21 | Stop reason: HOSPADM

## 2023-12-21 RX ORDER — CEFAZOLIN SODIUM 1 G/3ML
INJECTION, POWDER, FOR SOLUTION INTRAMUSCULAR; INTRAVENOUS AS NEEDED
Status: DISCONTINUED | OUTPATIENT
Start: 2023-12-21 | End: 2023-12-21

## 2023-12-21 RX ORDER — PROPOFOL 10 MG/ML
INJECTION, EMULSION INTRAVENOUS CONTINUOUS PRN
Status: DISCONTINUED | OUTPATIENT
Start: 2023-12-21 | End: 2023-12-21

## 2023-12-21 RX ORDER — ACETAMINOPHEN 325 MG/1
975 TABLET ORAL EVERY 8 HOURS
Status: DISCONTINUED | OUTPATIENT
Start: 2023-12-21 | End: 2023-12-21 | Stop reason: HOSPADM

## 2023-12-21 RX ORDER — OXYCODONE HYDROCHLORIDE 5 MG/1
5 TABLET ORAL EVERY 4 HOURS PRN
Qty: 42 TABLET | Refills: 0 | Status: SHIPPED | OUTPATIENT
Start: 2023-12-21 | End: 2023-12-31

## 2023-12-21 RX ORDER — ACETAMINOPHEN 500 MG
1000 TABLET ORAL EVERY 8 HOURS
Qty: 60 TABLET | Refills: 0 | Status: SHIPPED | OUTPATIENT
Start: 2023-12-21

## 2023-12-21 RX ORDER — ONDANSETRON 2 MG/ML
4 INJECTION INTRAMUSCULAR; INTRAVENOUS EVERY 6 HOURS PRN
Status: DISCONTINUED | OUTPATIENT
Start: 2023-12-21 | End: 2023-12-21 | Stop reason: HOSPADM

## 2023-12-21 RX ORDER — TRANEXAMIC ACID 10 MG/ML
1000 INJECTION, SOLUTION INTRAVENOUS ONCE
Status: COMPLETED | OUTPATIENT
Start: 2023-12-21 | End: 2023-12-21

## 2023-12-21 RX ORDER — CHLORHEXIDINE GLUCONATE ORAL RINSE 1.2 MG/ML
15 SOLUTION DENTAL ONCE
Status: COMPLETED | OUTPATIENT
Start: 2023-12-21 | End: 2023-12-21

## 2023-12-21 RX ORDER — HYDROMORPHONE HCL/PF 1 MG/ML
0.5 SYRINGE (ML) INJECTION
Status: DISCONTINUED | OUTPATIENT
Start: 2023-12-21 | End: 2023-12-21 | Stop reason: HOSPADM

## 2023-12-21 RX ORDER — CEFADROXIL 500 MG/1
500 CAPSULE ORAL EVERY 12 HOURS SCHEDULED
Qty: 10 CAPSULE | Refills: 0 | Status: SHIPPED | OUTPATIENT
Start: 2023-12-21 | End: 2023-12-26

## 2023-12-21 RX ORDER — BUPIVACAINE HYDROCHLORIDE 7.5 MG/ML
INJECTION, SOLUTION INTRASPINAL AS NEEDED
Status: DISCONTINUED | OUTPATIENT
Start: 2023-12-21 | End: 2023-12-21

## 2023-12-21 RX ORDER — CEFAZOLIN SODIUM 2 G/50ML
2000 SOLUTION INTRAVENOUS EVERY 8 HOURS
Status: DISCONTINUED | OUTPATIENT
Start: 2023-12-21 | End: 2023-12-21 | Stop reason: HOSPADM

## 2023-12-21 RX ORDER — ACETAMINOPHEN 325 MG/1
975 TABLET ORAL ONCE
Status: COMPLETED | OUTPATIENT
Start: 2023-12-21 | End: 2023-12-21

## 2023-12-21 RX ORDER — SODIUM CHLORIDE, SODIUM LACTATE, POTASSIUM CHLORIDE, CALCIUM CHLORIDE 600; 310; 30; 20 MG/100ML; MG/100ML; MG/100ML; MG/100ML
125 INJECTION, SOLUTION INTRAVENOUS CONTINUOUS
Status: DISCONTINUED | OUTPATIENT
Start: 2023-12-21 | End: 2023-12-21 | Stop reason: HOSPADM

## 2023-12-21 RX ORDER — MIDAZOLAM HYDROCHLORIDE 2 MG/2ML
INJECTION, SOLUTION INTRAMUSCULAR; INTRAVENOUS AS NEEDED
Status: DISCONTINUED | OUTPATIENT
Start: 2023-12-21 | End: 2023-12-21

## 2023-12-21 RX ORDER — CEFAZOLIN SODIUM 2 G/50ML
2000 SOLUTION INTRAVENOUS ONCE
Status: COMPLETED | OUTPATIENT
Start: 2023-12-21 | End: 2023-12-21

## 2023-12-21 RX ORDER — ONDANSETRON 2 MG/ML
INJECTION INTRAMUSCULAR; INTRAVENOUS AS NEEDED
Status: DISCONTINUED | OUTPATIENT
Start: 2023-12-21 | End: 2023-12-21

## 2023-12-21 RX ORDER — ONDANSETRON 4 MG/1
4 TABLET, ORALLY DISINTEGRATING ORAL EVERY 6 HOURS PRN
Qty: 20 TABLET | Refills: 0 | Status: SHIPPED | OUTPATIENT
Start: 2023-12-21

## 2023-12-21 RX ORDER — DEXAMETHASONE SODIUM PHOSPHATE 4 MG/ML
INJECTION, SOLUTION INTRA-ARTICULAR; INTRALESIONAL; INTRAMUSCULAR; INTRAVENOUS; SOFT TISSUE AS NEEDED
Status: DISCONTINUED | OUTPATIENT
Start: 2023-12-21 | End: 2023-12-21

## 2023-12-21 RX ORDER — MAGNESIUM HYDROXIDE/ALUMINUM HYDROXICE/SIMETHICONE 120; 1200; 1200 MG/30ML; MG/30ML; MG/30ML
30 SUSPENSION ORAL EVERY 6 HOURS PRN
Status: DISCONTINUED | OUTPATIENT
Start: 2023-12-21 | End: 2023-12-21 | Stop reason: HOSPADM

## 2023-12-21 RX ORDER — SODIUM CHLORIDE, SODIUM LACTATE, POTASSIUM CHLORIDE, CALCIUM CHLORIDE 600; 310; 30; 20 MG/100ML; MG/100ML; MG/100ML; MG/100ML
100 INJECTION, SOLUTION INTRAVENOUS CONTINUOUS
Status: DISCONTINUED | OUTPATIENT
Start: 2023-12-21 | End: 2023-12-21 | Stop reason: HOSPADM

## 2023-12-21 RX ORDER — MAGNESIUM HYDROXIDE 1200 MG/15ML
LIQUID ORAL AS NEEDED
Status: DISCONTINUED | OUTPATIENT
Start: 2023-12-21 | End: 2023-12-21 | Stop reason: HOSPADM

## 2023-12-21 RX ORDER — CALCIUM CARBONATE 500 MG/1
1000 TABLET, CHEWABLE ORAL DAILY PRN
Status: DISCONTINUED | OUTPATIENT
Start: 2023-12-21 | End: 2023-12-21 | Stop reason: HOSPADM

## 2023-12-21 RX ORDER — AMOXICILLIN 250 MG
1 CAPSULE ORAL DAILY
Qty: 30 TABLET | Refills: 0 | Status: SHIPPED | OUTPATIENT
Start: 2023-12-21

## 2023-12-21 RX ORDER — FENTANYL CITRATE 50 UG/ML
50 INJECTION, SOLUTION INTRAMUSCULAR; INTRAVENOUS
Status: DISCONTINUED | OUTPATIENT
Start: 2023-12-21 | End: 2023-12-21 | Stop reason: HOSPADM

## 2023-12-21 RX ORDER — OXYCODONE HYDROCHLORIDE 10 MG/1
10 TABLET ORAL EVERY 4 HOURS PRN
Status: DISCONTINUED | OUTPATIENT
Start: 2023-12-21 | End: 2023-12-21 | Stop reason: HOSPADM

## 2023-12-21 RX ORDER — DOCUSATE SODIUM 100 MG/1
100 CAPSULE, LIQUID FILLED ORAL 2 TIMES DAILY
Status: DISCONTINUED | OUTPATIENT
Start: 2023-12-21 | End: 2023-12-21 | Stop reason: HOSPADM

## 2023-12-21 RX ORDER — GABAPENTIN 300 MG/1
300 CAPSULE ORAL ONCE
Status: COMPLETED | OUTPATIENT
Start: 2023-12-21 | End: 2023-12-21

## 2023-12-21 RX ADMIN — SODIUM CHLORIDE, SODIUM LACTATE, POTASSIUM CHLORIDE, AND CALCIUM CHLORIDE: .6; .31; .03; .02 INJECTION, SOLUTION INTRAVENOUS at 10:26

## 2023-12-21 RX ADMIN — SODIUM CHLORIDE, SODIUM LACTATE, POTASSIUM CHLORIDE, AND CALCIUM CHLORIDE: .6; .31; .03; .02 INJECTION, SOLUTION INTRAVENOUS at 11:15

## 2023-12-21 RX ADMIN — CEFAZOLIN SODIUM 2000 MG: 2 SOLUTION INTRAVENOUS at 10:24

## 2023-12-21 RX ADMIN — MIDAZOLAM 1 MG: 1 INJECTION INTRAMUSCULAR; INTRAVENOUS at 10:28

## 2023-12-21 RX ADMIN — OXYCODONE HYDROCHLORIDE 5 MG: 5 TABLET ORAL at 17:34

## 2023-12-21 RX ADMIN — CHLORHEXIDINE GLUCONATE 0.12% ORAL RINSE 15 ML: 1.2 LIQUID ORAL at 09:02

## 2023-12-21 RX ADMIN — FENTANYL CITRATE 50 MCG: 50 INJECTION INTRAMUSCULAR; INTRAVENOUS at 10:31

## 2023-12-21 RX ADMIN — TRANEXAMIC ACID 1000 MG: 10 INJECTION, SOLUTION INTRAVENOUS at 10:37

## 2023-12-21 RX ADMIN — PROPOFOL 20 MG: 10 INJECTION, EMULSION INTRAVENOUS at 10:37

## 2023-12-21 RX ADMIN — DEXAMETHASONE SODIUM PHOSPHATE 4 MG: 4 INJECTION INTRA-ARTICULAR; INTRALESIONAL; INTRAMUSCULAR; INTRAVENOUS; SOFT TISSUE at 10:52

## 2023-12-21 RX ADMIN — CEFAZOLIN SODIUM 2000 MG: 2 SOLUTION INTRAVENOUS at 17:33

## 2023-12-21 RX ADMIN — PHENYLEPHRINE HYDROCHLORIDE 20 MCG/MIN: 10 INJECTION INTRAVENOUS at 10:35

## 2023-12-21 RX ADMIN — GABAPENTIN 300 MG: 300 CAPSULE ORAL at 09:00

## 2023-12-21 RX ADMIN — PROPOFOL 120 MCG/KG/MIN: 10 INJECTION, EMULSION INTRAVENOUS at 10:38

## 2023-12-21 RX ADMIN — FENTANYL CITRATE 25 MCG: 50 INJECTION INTRAMUSCULAR; INTRAVENOUS at 12:11

## 2023-12-21 RX ADMIN — FENTANYL CITRATE 25 MCG: 50 INJECTION INTRAMUSCULAR; INTRAVENOUS at 11:36

## 2023-12-21 RX ADMIN — OXYCODONE HYDROCHLORIDE 5 MG: 5 TABLET ORAL at 13:38

## 2023-12-21 RX ADMIN — MIDAZOLAM 1 MG: 1 INJECTION INTRAMUSCULAR; INTRAVENOUS at 10:24

## 2023-12-21 RX ADMIN — ACETAMINOPHEN 325MG 975 MG: 325 TABLET ORAL at 17:33

## 2023-12-21 RX ADMIN — CEFAZOLIN 1000 MG: 1 INJECTION, POWDER, FOR SOLUTION INTRAMUSCULAR; INTRAVENOUS; PARENTERAL at 10:37

## 2023-12-21 RX ADMIN — ONDANSETRON 4 MG: 2 INJECTION INTRAMUSCULAR; INTRAVENOUS at 10:24

## 2023-12-21 RX ADMIN — ACETAMINOPHEN 325MG 975 MG: 325 TABLET ORAL at 09:00

## 2023-12-21 RX ADMIN — BUPIVACAINE HYDROCHLORIDE IN DEXTROSE 1.6 ML: 7.5 INJECTION, SOLUTION SUBARACHNOID at 10:35

## 2023-12-21 NOTE — ANESTHESIA POSTPROCEDURE EVALUATION
Post-Op Assessment Note    CV Status:  Stable  Pain Score: 1    Pain management: adequate       Mental Status:  Alert and awake   Hydration Status:  Euvolemic   PONV Controlled:  Controlled   Airway Patency:  Patent     Post Op Vitals Reviewed: Yes      Staff: CRNA   Comments: pt able to move legs post spinal but states legs are still numb            BP   109/71   Temp   98.7   Pulse  78   Resp   18   SpO2   97 ra

## 2023-12-21 NOTE — PHYSICAL THERAPY NOTE
Pt is a 53 y.o.male  presenting to [facility]  on 12/21/2023 for elective surgical management of Primary osteoarthritis of left hip. Pt now POD0 s/p L posterolateral CHRISTOPHER with WB status of WBAT LLE and posterior hip precautions.  Additional significant pmhx:  post-operative n/v . PT consulted to assess pt's functional mobility and d/c needs with orders for activity as tolerated and activity beginning POD0. Pt presents with acute post-operative deficits of *** {kmbodysystem:90475} that limit functional mobility and activity tolerance relative to baseline.  PTA, pt amb with *** AD  for *** distances at *** level.  Pt has [address] floor apartment with bilateral handrail. Post surgically, pt functioning at ***. Intervention provided with emphasis on *** to increase independence and safety.  Vitals/sxs ***. Please refer to flowsheet for additional description of objective findings. Following intervention, pt demonstrated ability to safely ambulate *** distances with use of *** and negotiate stairs required for safe d/c to home at *** level.      D/c recommendation reviewed and fall education provided. From PT/mobility perspective, pt appropriate for d/c at this time. Pt expresses no concerns regarding d/c. Current PT recommendations for DME include ***. PT recommendations for d/c are level ***  ( *** ) rehab intensity resources to address pt deficits and promote return to *** PLOF when medically ready with ***.     Reviewed HEP for ***  and handout provided. All questions and concerns addressed at this time.     *** ADD [facility]     15:13-15:58    16:20-16:46    multiple transfers completed throughout evaluation performed from bed/chair to RW for stability. Verbal/visual demonstration provided with VC for hand placement and safe technique

## 2023-12-21 NOTE — INTERVAL H&P NOTE
H&P reviewed. After examining the patient I find no changes in the patients condition since the H&P had been written.  Plan for left CHRISTOPHER.

## 2023-12-21 NOTE — DISCHARGE INSTR - AVS FIRST PAGE
Dr. Grayson Hip Replacement    What to Expect/Activity  You are weight bearing as tolerated to your operative leg unless otherwise instructed. Use your walker or crutches. It is important to get up and walk for 10 minutes every hour, if possible.  Initial recovery therapy is focused on walking. If in your follow-up a referral to formal physical therapy is required, this will be provided based on your recovery.  You may sleep however you would like. Many patients feel more comfortable with a pillow between their legs and find it uncomfortably to lay on the operative side. If you do roll on that side at night you will not damage the replacement.  You may use a regular or elevated toilet seat, whichever is more comfortable.  We do not routinely require any specific precautions in terms of positioning of your leg and if so this will be taught to you by the physical therapists at the hospital. This means that you can dress as you are comfortable, including shoes and socks. You can bend down carefully to get items from the floor.   Swelling and discomfort causes pain. Elevate your surgical leg on 1-2 pillows placed behind your ankle/calf throughout the day, especially when you are laying down.  Please use ice packs for 20-30 minutes every 1-2 hours for 48-72 hours on the operative hip. Please make sure there is a barrier directly between your skin and your ice/ice pack.  Please use incentive spirometer 10 times per hour while awake (see diagram below).    Dressing/Wound Care/Bathing  There is a surgical dressing over your incision that stays in place until follow-up unless it starts to peel off.   You may start showering 24 hours after surgery, the surgical dressing will remain in place. Please pat the dressing dry. If you notice the dressing appears saturated or is starting to come off, please replace with a dry dressing.  Keep the dressing on until your follow-up in the office.   There may be dried blood around the  incision. It is ok to continue showering after removing the dressing but do not scrub the incision. Pat incision dry.  Do not place any creams, ointments or gels on or around the incision.  No baths, swimming or submerging until cleared by Dr. Grayson.    Pain Management/Medications  You may resume your usual medications.  Please take the following medications:  Anti-coagulation (blood clot prevention) - aspiring 81mg twice daily for 4 weeks  Pain medication:  Narcotic Medication: Take as directed  NSAID (Anti-inflammatory): Take as directed  Tylenol 1000mg every 8 hours  Zofran (ondasetron) - 4mg every 8 hours as needed for nausea  Stool Softeners (senna/colace)- take daily to help prevent constipation as narcotic pain medication causes constipation  Antibiotic - take as directed if prescribed  If you have questions or pain concerns, please contact the office. Pain medication cannot remove all post-operative pain.    Follow up/Call if:  The findings of your surgery will be explained to you and your family immediately after surgery. However, in the post-operative period, during recovery from anesthesia you may not fully remember or fully understand what was said. We will go over this again when you return for your post-op appointment.  Please contact Dr. Grayson's office if you experience the following:  Excessive bleeding (bleeding through your dressing)  Fever greater than 101 degrees F after the first 2 days (a slight fever is normal the first few days after surgery)  Persistent nausea or vomiting  Decreased sensation or discoloration of the operative limb  Pain or swelling that is getting worse and not better with medication    Dr. Grayson's Office Contact: [phone]

## 2023-12-21 NOTE — ANESTHESIA PROCEDURE NOTES
Spinal Block    Patient location during procedure: OR  Start time: 12/21/2023 10:35 AM  Staffing  Performed by: Amber Cooper CRNA  Authorized by: Amber Cooper CRNA    Preanesthetic Checklist  Completed: patient identified, IV checked, site marked, risks and benefits discussed, surgical consent, monitors and equipment checked, pre-op evaluation and timeout performed  Spinal Block  Patient position: sitting  Prep: ChloraPrep  Patient monitoring: cardiac monitor, continuous pulse ox and frequent blood pressure checks  Approach: midline  Location: L3-4  Injection technique: single-shot  Needle  Needle type: pencil-tip   Needle gauge: 25 G  Needle length: 10 cm  Assessment  Sensory level: T4  Injection Assessment:  negative aspiration for heme, no paresthesia on injection and positive aspiration for clear CSF.  Additional Notes  Pt tolerated well. Positive  barbatoge at beginning and finish of intrathecal injection of local

## 2023-12-22 ENCOUNTER — APPOINTMENT (OUTPATIENT)
Dept: PHYSICAL THERAPY | Facility: MEDICAL CENTER | Age: 53
End: 2023-12-22
Payer: COMMERCIAL

## 2023-12-22 ENCOUNTER — TELEPHONE (OUTPATIENT)
Dept: OBGYN CLINIC | Facility: HOSPITAL | Age: 53
End: 2023-12-22

## 2023-12-22 NOTE — TELEPHONE ENCOUNTER
Patient contacted for a postoperative follow up assessment. Patient reports doing               . Patient states current pain level of a  0-2/10  when sitting and 0-2/10 when walking with RW. Patient states they have minimal pain and it is relieved with medication regimen. Patient denies increase in swelling and dressing is clean, dry and intact. Patient is not icing the site regularly but instructed to do so by NN. PT 12/26 at 11AM.    We reviewed patients AVS medication list. Patient is taking Tylenol 1000mg every 8 hours, Oxycodone 5mg PRN, Duricef 500mg BID, Celebrex 200mg BID, ASA 81mg BID, Senokot daily starting today. Patient has not yet had a BM but is passing gas.      Patient denies nausea, vomiting, abdominal pain, chest pain, shortness of breath, fever, dizziness and calf pain. Patient confirmed post-op appointment with surgeon on  1/2 at 10:45AM .Patient does not have any other questions or concerns at this time. Pt was encouraged to call with any questions, concerns or issues.

## 2023-12-22 NOTE — PHYSICAL THERAPY NOTE
" .PT PROGRESS NOTE    Name: Emmanuel Bey  AGE: 53 y.o.  MRN: [id number]  LENGTH OF STAY: 0       12/21/23 1825   PT Last Visit   PT Visit Date 12/21/23   Note Type   Note Type BID visit/treatment   Pain Assessment   Pain Assessment Tool 0-10   Pain Score 5   Pain Location/Orientation Orientation: Left;Location: Hip   Effect of Pain on Daily Activities limits mobility   Hospital Pain Intervention(s) Repositioned;Ambulation/increased activity   Restrictions/Precautions   Weight Bearing Precautions Per Order Yes   LLE Weight Bearing Per Order WBAT   Cognition   Overall Cognitive Status WFL   Arousal/Participation Alert;Cooperative   Attention Within functional limits   Orientation Level Oriented X4   Memory Within functional limits   Following Commands Follows all commands and directions without difficulty   Subjective   Subjective \"I will do the stairs.\"   Transfers   Sit to Stand 5  Supervision   Additional items Increased time required;Verbal cues;Armrests   Stand to Sit 5  Supervision   Additional items Increased time required;Verbal cues;Armrests   Additional Comments RW; occasional VC for hand placement   Ambulation/Elevation   Gait pattern Antalgic;Wide NATHAN;Improper Weight shift;Decreased L stance;Inconsistent scott;Excessively slow;Step to   Gait Assistance 5  Supervision   Additional items Verbal cues   Assistive Device Rolling walker   Distance 15'x1, 60'x1, 100'x1 using RW and supervision   Stair Management Assistance 5  Supervision   Additional items Verbal cues;Increased time required   Stair Management Technique Two rails;One rail R;Foreward;Sideways;Nonreciprocal   Number of Stairs 15  ( steps)   Ambulation/Elevation Additional Comments Steady with no gross LOB; VC for sequencing with turning to maintain hip precautions and gait mechanics; Stairs with verbal/visual demonstration to ascend b/l rails and forward non-reciprocal pattern; descend with two hands on unilateral rail sideways. Inc " time required. Verbally/visually demonstrated RW management on stairs > cont to recommend assist from friend as needed.   Balance   Static Sitting Good   Dynamic Sitting Fair +   Static Standing Fair  (rw)   Dynamic Standing Fair   Ambulatory Fair  (rw)   Endurance Deficit   Endurance Deficit Yes   Endurance Deficit Description fatigue and pain with inc ambulatory distance; dec standing tolerance   Activity Tolerance   Activity Tolerance Patient tolerated treatment well;Patient limited by fatigue;Patient limited by pain  (BP supine chair 116/74 post 15' amb; 143/80 post 60' amb; 127/72 at end of session (all seated without sxs))   Medical Staff Made Aware RN   Nurse Made Aware Yes--RN cleared/updated on pt perforamnce and d/c recommendations   Assessment   Prognosis Good   Problem List Decreased strength;Decreased range of motion;Decreased endurance;Impaired balance;Decreased mobility;Orthopedic restrictions;Decreased skin integrity;Obesity;Pain   Assessment Pt tolerated treatment session well with vitals stable throughout session with activity. Pt demonstrating progress towards functional goals with ability to complete functional transfers, amb with RW, and stair negotiation at supervision level required for safe d/c to home. Pt's friend that is a nurse is able to support upon d/c. Intervention included review of HEP/CHRISTOPHER handout, gait training and stair negotiation, fall education, education on continued mobility while pending follow-up PT care after d/c, and review self-care/home management. Pt demonstrated adequate awareness of precautions using teachback method and maintained during functional mobility. All questions/concerns were addressed. D/c recommendation reviewed. From PT/mobility perspective, pt appropriate for d/c at this time. Current PT recommendations for DME include RW. Pt expresses no concerns regarding d/c. PT recommendations for d/c are level III  ( MIN ) rehab intensity resources to address pt  deficits and promote return to independent and active lifestyle when medically ready with use of RW and support of friend.     The patient's AM-[facility] Basic Mobility Inpatient Short Form Raw Score is 18. A Raw score of greater than 16 suggests the patient may benefit from discharge to home. Please also refer to the recommendation of the Physical Therapist for safe discharge planning.   Barriers to Discharge None   Barriers to Discharge Comments (+) time alone   Goals   Patient Goals Go home and eventually hike 5 miles   STG Expiration Date 12/31/23   Short Term Goal #1 1). Pt will perform bed mobility with Luz Elena demonstrating appropriate technique 100% of the time in order to improve function.2) Perform all transfers with Luz Elena demonstrating safe and appropriate technique 100% of the time in order to improve ability to negotiate safely in home environment.3) Amb with least restrictive AD > 100'x1 with mod I in order to demonstrate ability to negotiate in home environment.4) Improve overall strength and balance 1/2 grade in order to optimize ability to perform functional tasks and reduce fall risk.5) Increase activity tolerance to 45 minutes in order to improve endurance to functional tasks.6) Negotiate flight of stairs using most appropriate technique and S in order to be able to negotiate safely in home environment. 7) PT for ongoing patient and family/caregiver education, DME needs and d/c planning in order to promote highest level of function in least restrictive environment.   PT Treatment Day 2   Plan   Treatment/Interventions Functional transfer training;LE strengthening/ROM;Elevations;Therapeutic exercise;Endurance training;Patient/family training;Equipment eval/education;Bed mobility;Gait training;Continued evaluation;Spoke to nursing;OT   Progress Discontinue PT   Discharge Recommendation   Rehab Resource Intensity Level, PT III (Minimum Resource Intensity)   Equipment Recommended Walker  (pt has)   AM-[facility] Basic  Mobility Inpatient   Turning in Flat Bed Without Bedrails 3   Lying on Back to Sitting on Edge of Flat Bed Without Bedrails 3   Moving Bed to Chair 3   Standing Up From Chair Using Arms 3   Walk in Room 3   Climb 3-5 Stairs With Railing 3   Basic Mobility Inpatient Raw Score 18   Basic Mobility Standardized Score 41.05   Highest Level Of Mobility   -HLM Goal 6: Walk 10 steps or more   JH-HLM Achieved 7: Walk 25 feet or more   Education   Education Provided Mobility training;Home exercise program;Precautions for total hip arthroplasty (CHRISTOPHER);Assistive device  (Handout provided. All questions/concerns addressed. Pt demonstrated understanding using teachback method, able to recite 3/3 precautions.)   Patient Explanation/teachback used;Demonstrates acceptance/verbal understanding   End of Consult   Patient Position at End of Consult Bedside chair;All needs within reach  (RN in room, pt stable)   End of Consult Comments Pt cleared for safe mobility and d/c to home when medically ready.       Robert Redd  8:35 PM  12/21/23

## 2023-12-22 NOTE — OCCUPATIONAL THERAPY NOTE
Occupational Therapy Evaluation and Treatment     Patient Name: Emmanuel Bey  Today's Date: 12/21/2023  Problem List  Principal Problem:    Primary osteoarthritis of left hip  Active Problems:    PONV (postoperative nausea and vomiting)    Past Medical History  Past Medical History:   Diagnosis Date    Allergy-induced asthma     Colon polyp     PONV (postoperative nausea and vomiting)      Past Surgical History  Past Surgical History:   Procedure Laterality Date    ARTHROSCOPIC REPAIR ACL Left     L knee    COLONOSCOPY      NASAL SEPTUM SURGERY           12/21/23 4096   OT Last Visit   OT Visit Date 12/21/23   Note Type   Note type Evaluation   Pain Assessment   Pain Assessment Tool 0-10   Pain Score 5   Pain Location/Orientation Orientation: Left;Location: Hip   Hospital Pain Intervention(s) Repositioned;Ambulation/increased activity   Restrictions/Precautions   Weight Bearing Precautions Per Order Yes   LLE Weight Bearing Per Order WBAT   Other Precautions THR;WBS;Fall Risk;Pain  (posterior THP L)   Home Living   Type of Home Apartment   Home Layout One level;Stairs to enter with rails   Bathroom Shower/Tub Tub/shower unit   Bathroom Toilet Raised   Bathroom Equipment Grab bars around toilet;Toilet raiser  (and countertop next to toilet)   Home Equipment Walker;Crutches   Additional Comments Pt resides alone in a one level 2nd floor apartment with full flight of stairs to enter.   Prior Function   Level of Ray Independent with ADLs;Independent with functional mobility;Independent with IADLS   Lives With Alone   Receives Help From Friend(s)   IADLs Independent with driving;Independent with meal prep;Independent with medication management   Falls in the last 6 months 0   Vocational Full time employment  ( at [employer] (formerly [employer]   Comments PTA, Pt reports being I with ADLs/IADLs/no AD/ +. Pt with supportive friend able to assist with transportation and ADLs upon DC.   Lifestyle   Autonomy I  with ADLs/IADLs/no AD/ +/works FT   Reciprocal Relationships Supportive spouse   Service to Others FTE   Intrinsic Gratification Goal is to hike 5 miles   ADL   Where Assessed Edge of bed   Eating Assistance 7  Independent   Grooming Assistance 5  Supervision/Setup   UB Bathing Assistance 5  Supervision/Setup   LB Bathing Assistance 2  Maximal Assistance   UB Dressing Assistance 5  Supervision/Setup   LB Dressing Assistance 2  Maximal Assistance   LB Dressing Deficit Thread RLE into pants;Thread LLE into pants;Don/doff L sock   Toileting Assistance  2  Maximal Assistance   Toileting Deficit Setup;Supervison/safety;Increased time to complete  (incontinent of urine upon standing- max A hygiene)   Functional Assistance 4  Minimal Assistance   Functional Deficit Setup;Supervision/safety;Increased time to complete   Bed Mobility   Supine to Sit 4  Minimal assistance   Additional items HOB elevated;Increased time required;LE management;Verbal cues   Sit to Supine Unable to assess   Additional Comments Pt greeted supine in bed.   Transfers   Sit to Stand 5  Supervision   Additional items Increased time required;Verbal cues   Stand to Sit 5  Supervision   Additional items Increased time required;Verbal cues   Additional Comments with RW   Functional Mobility   Functional Mobility 5  Supervision   Additional Comments S with functional mobility with RW- within room distances.   Additional items Rolling walker   Balance   Static Sitting Good   Dynamic Sitting Fair +   Static Standing Fair   Dynamic Standing Fair -   Ambulatory Fair -   Activity Tolerance   Activity Tolerance (S)  Treatment limited secondary to medical complications (Comment)  (nausea and low BP* see treatment note for further details)   Medical Staff Made Aware Co-eval with EVIN Jones 2* to Pt's medical complexity, decreased endurance, and post-surgical.   Nurse Made Aware RN cleared/updated. RN aware of Pt's drop in BP.   RUE Assessment   RUE  Assessment WFL   LUE Assessment   LUE Assessment WFL   Hand Function   Gross Motor Coordination Functional   Fine Motor Coordination Functional   Sensation   Light Touch Partial deficits in the LLE   Cognition   Overall Cognitive Status WFL   Arousal/Participation Alert;Cooperative   Attention Within functional limits   Orientation Level Oriented X4   Memory Within functional limits   Following Commands Follows all commands and directions without difficulty   Comments Pt very pleasant and cooperative during OT session.   Assessment   Limitation Decreased ADL status;Decreased UE ROM;Decreased UE strength;Decreased Safe judgement during ADL;Decreased cognition;Decreased endurance;Decreased self-care trans;Decreased high-level ADLs   Prognosis Fair   Assessment Pt is a 53 y.o. male who presented to [facility] on 12/21/2023 with OA of L hip. Pt s/p L CHRISTOPHER on 12/21/2023. Pt WBAT on LLE and with LLE posterior hip precautions. Pt  has a past medical history of Allergy-induced asthma, Colon polyp, and PONV (postoperative nausea and vomiting). Pt greeted bedside for OT evaluation on 12/21/23. Pt resides alone in a one level 2nd floor apartment with full flight of stairs to enter. PTA, Pt reports being I with ADLs/IADLs/no AD/ +. Pt with supportive friend able to assist with transportation and ADLs upon DC. Pt demonstrating the following occupational performance levels: S with UB ADLs, max A with LB ADLs, min A with bed mobility, S with functional transfers and S with functional mobility with RW. Limitations that impact functional performance include decreased ADL status, decreased UE ROM, decreased UE strength, decreased safe judgement during ADLs, decreased cognition, decreased self care transfers, decreased high level ADLs, and pain. Occupational performance areas to address ADL retraining, functional transfer training, UE strengthening/ROM, endurance training, cognitive reorientation, Pt/caregiver education, equipment  evaluation/education, compensatory technique education, energy conservation, and activity engagement . Pt would benefit from continued skilled OT services while in hospital to maximize independence with ADLs. Will continue to follow Pt's progress. Pt would benefit from returning home with increased social support (PENDING PROGRESS) upon DC to maximize safety and independence with ADLs and functional tasks of choice.   Goals   Patient Goals To go home.   LTG Time Frame 10-14   Long Term Goal #1 See goals listed below.   Plan   Treatment Interventions ADL retraining;Functional transfer training;UE strengthening/ROM;Endurance training;Equipment evaluation/education;Patient/family training;Cognitive reorientation;Compensatory technique education;Activityengagement;Energy conservation   Goal Expiration Date 12/28/23   OT Frequency 3-5x/wk;Other (comment)  (BID PRN)   Discharge Recommendation   Rehab Resource Intensity Level, OT No post-acute rehabilitation needs  (PENDING PROGRESS)   Equipment Recommended Hip Kit ($);Shower transfer bench ($$)  (Pt provided with hip kit at end of session. Pt educated on where to purchase transfer tub bench.)   Additional Comments  The patient's raw score on the AM-PAC Daily Activity Inpatient Short Form is 16. A raw score of less than 19 suggests the patient may benefit from discharge to post-acute rehabilitation services. Please refer to the recommendation of the Occupational Therapist for safe discharge planning.   AM-PAC Daily Activity Inpatient   Lower Body Dressing 2   Bathing 2   Toileting 2   Upper Body Dressing 3   Grooming 3   Eating 4   Daily Activity Raw Score 16   Daily Activity Standardized Score (Calc for Raw Score >=11) 35.96   AM-PAC Applied Cognition Inpatient   Following a Speech/Presentation 4   Understanding Ordinary Conversation 4   Taking Medications 4   Remembering Where Things Are Placed or Put Away 4   Remembering List of 4-5 Errands 4   Taking Care of  Complicated Tasks 4   Applied Cognition Raw Score 24   Applied Cognition Standardized Score 62.21   Additional Treatment Session   Start Time 1555   End Time 1654   Treatment Assessment Pt greeted for follow up treatment focusing on increasing independence with ADLs. Pt completes functional transfers with S and functional mobility at S with RW to therapy mat. Pt provided with and educated on handout reviewing LB dressing techniques, use of hip kit, posterior THP, and shower transfers. Therapist demonstrated use of hip kit and Pt able to complete donning/doffing of socks at S level. Therapist demonstrated use of transfer tub bench upon DC. Pt able to tolerate sitting on EOB at S level during ADL routine x20 min. Pt then became nauseous on EOB and diaphoretic. Pt BP seated on EOB: 78/59. Pt then returned supine on therapy mat with max AX3 (2* to requiring A with B/L LE and assist with boosting up towards head of therapy mat). Pt with BP supine on mat s/p rest and ankle/hand pumps: 115/63. Pt rested supine on mat x10 min and RN aware. Pt returned seated on EOB once dizziness resolved. BP seated on EOB: 112/73. Pt completes functional transfers with min AX1 and functional mobility with S at RW to staxi. Pt then returned to PACU room and returned supine in bed with min AX1. RN aware.   Additional Treatment Day 1   End of Consult   Education Provided Yes;Family or social support of family present for education by provider   Patient Position at End of Consult All needs within reach;Supine   Nurse Communication Nurse aware of consult       GOALS:  Pt will complete UB ADLs with I in order to maximize participation with ADLs.   Pt will complete LB ADLs with I in order to maximize safety with ADLs.   Pt will complete toileting routine (transfer, hygiene, and clothing management) with I in order to return to prior level of function.  Pt will complete bed mobility with I in order to maximize participation with ADLs.   Pt will  complete functional transfers at I level in order to increase participation with ADLs.  Pt will increase dynamic standing balance to F+ in order to increase safety with ADLs.  Pt will increase standing tolerance x10 min in order to increase participation with ADLs.   Pt will complete functional mobility with AD PRN for item retrieval task at Luz Elena level in order to increase participation with ADLs.  Pt will complete IADL tasks/simulation of IADLs tasks with I in order to return to PLOF.  Pt will demonstrate G energy conservation techniques with ADLs/IADLs in order to reduce the risk of falls.  Pt will be attentive 100% of the time for ongoing functional/formal cognitive assessment to assist with safe dc planning prn.  Pt will maintain 3/3 % of the time during OT session.          Joanna Vazquez MS, OTR/L

## 2023-12-22 NOTE — PLAN OF CARE
Problem: OCCUPATIONAL THERAPY ADULT  Goal: Performs self-care activities at highest level of function for planned discharge setting.  See evaluation for individualized goals.  Description: Treatment Interventions: ADL retraining, Functional transfer training, UE strengthening/ROM, Endurance training, Equipment evaluation/education, Patient/family training, Cognitive reorientation, Compensatory technique education, Activityengagement, Energy conservation  Equipment Recommended: Hip Kit ($), Shower transfer bench ($$) (Pt provided with hip kit at end of session. Pt educated on where to purchase transfer tub bench.)       See flowsheet documentation for full assessment, interventions and recommendations.   Note: Limitation: Decreased ADL status, Decreased UE ROM, Decreased UE strength, Decreased Safe judgement during ADL, Decreased cognition, Decreased endurance, Decreased self-care trans, Decreased high-level ADLs  Prognosis: Fair  Assessment: Pt is a 53 y.o. male who presented to [facility] on 12/21/2023 with OA of L hip. Pt s/p L CHRISTOPHER on 12/21/2023. Pt WBAT on LLE and with LLE posterior hip precautions. Pt  has a past medical history of Allergy-induced asthma, Colon polyp, and PONV (postoperative nausea and vomiting). Pt greeted bedside for OT evaluation on 12/21/23. Pt resides alone in a one level 2nd floor apartment with full flight of stairs to enter. PTA, Pt reports being I with ADLs/IADLs/no AD/ +. Pt with supportive friend able to assist with transportation and ADLs upon DC. Pt demonstrating the following occupational performance levels: S with UB ADLs, max A with LB ADLs, min A with bed mobility, S with functional transfers and S with functional mobility with RW. Limitations that impact functional performance include decreased ADL status, decreased UE ROM, decreased UE strength, decreased safe judgement during ADLs, decreased cognition, decreased self care transfers, decreased high level ADLs, and pain.  Occupational performance areas to address ADL retraining, functional transfer training, UE strengthening/ROM, endurance training, cognitive reorientation, Pt/caregiver education, equipment evaluation/education, compensatory technique education, energy conservation, and activity engagement . Pt would benefit from continued skilled OT services while in hospital to maximize independence with ADLs. Will continue to follow Pt's progress. Pt would benefit from returning home with increased social support (PENDING PROGRESS) upon DC to maximize safety and independence with ADLs and functional tasks of choice.     Rehab Resource Intensity Level, OT: No post-acute rehabilitation needs (PENDING PROGRESS)

## 2023-12-22 NOTE — OP NOTE
OPERATIVE REPORT  PATIENT NAME: Emmanuel Bey  : 1970  MRN: [id number]  Pt Location:  [facility] OR ROOM 04    Surgery Date: 2023    Surgeons and Role:     * Kenneth Grayson, DO - Primary     * Hannah Wong PA-C     Preop Diagnosis:  Primary osteoarthritis of left hip [M16.12]    Post-Op Diagnosis Codes:     * Primary osteoarthritis of left hip [M16.12]    Procedure(s):  Left - ARTHROPLASTY HIP TOTAL- SAME DAY    Specimens:  * No specimens in log *    Estimated Blood Loss:   350 mL    Drains:  * No LDAs found *    Anesthesia Type:   Spinal      Operative Indications:  Primary osteoarthritis of left hip [M16.12]  53M with left hip pain 2/2 severe osteoarthritis of the hip. He has tried and failed all non operative treatment. His pain is affecting his ADLs and his ability to work. The patient has elected to proceed with Left CHRISTOPHER through the posterior approach. Risks and benefits of surgery to include but not limited to bleeding, infection, damage to surrounding structures, hardware failure, instability, fracture, dislocation, leg length inequality, need for further surgery, continued pain, stiffness, blood clots, stroke, and heart attack was discussed with the patient.     Operative Findings:  Severe osteoarthritis   Native femoral head 51 mm    Implant Name Type Inv. Item Serial No.  Lot No. LRB No. Used Action   ACETABULAR SHELL 3HL 54MM CMNTLS EMPHASYS - [id number]  ACETABULAR SHELL 3HL 54MM CMNTLS EMPHASYS  DEPUY [id number] Left 1 Implanted   SCREW SERGIO 6.5 X 30MM SLF TAP PINNACLE - [id number]  SCREW SERGIO 6.5 X 30MM SLF TAP PINNACLE  DEPUY [id number] Left 1 Implanted   LINER AOX NEUTRAL 54 X 40MM EMPHASYS - [id number]  LINER AOX NEUTRAL 54 X 40MM EMPHASYS  DEPUY [id number] Left 1 Implanted   STEM FEM CLRD HI OFFSET 5HL EMPHASYS - [id number]  STEM FEM CLRD HI OFFSET 5HL EMPHASYS  DEPUY [id number] Left 1 Implanted   HEAD FEMORAL  TPR 40MM +5MM BIOLOX DELTA TS ARTICULEZE - [id number]  HEAD  FEMORAL 12/14 TPR 40MM +5MM BIOLOX DELTA TS ARTICULEZE  DEPUY [id number] Left 1 Implanted       Complications:   None    Hip Approach: Posterior    Procedure and Technique:  The patient seen in the preoperative area.  The informed consent was confirmed.  The operative site was confirmed and marked.  Patient was taken to the operating room and anesthesia was performed by the anesthesiologist.  Once complete the patient was placed in the lateral decubitus position with the operative hip up held in place with stulberg hip positioners. All bony prominences were well-padded. The left lower extremity was prepped and draped in typical sterile fashion.  Perioperative antibiotics were given per scip protocol prior to incision.  A formal timeout was performed identifying the patient and surgical site. A standard posterolateral approach to the hip was drawn.  The skin was sharply dissected with a 10 blade.  Electrocautery was used down to the level of iliotibial band fascia.  This was incised in line with the femur and along the fibers of the gluteus carmita.  A cobra retractor was placed underneath the medius exposing the posterior capsule and short external rotators. The piriformis tendon was found and taken down off of its insertion at the greater trochanter.  Capsule was incised in L-shaped fashion along the back of the trochanter and along where piriformis tendon would have been.  The sciatic nerve was palpated and protected.  The hip was internally rotated and dislocated.  There was significant deformity of the femoral head.  Retractors were placed around the neck and the neck was resected in line with preoperative template.  Femoral head was removed and sized to be approximately 51 mm. At this time the leg was placed in a position of sleep and acetabular retractors were carefully placed.  The labrum was removed with sharp dissection. There was significant katherine-acetabular osteophytes which were carefully taken down with an  osteotome.  Sequential reaming was initiated with a 49 Reamer to the medial wall. Sequential reaming was then performed up to a size 54 reamer with excellent chatter. The Emphasys 54 mm cluster hole cup was impacted into place aiming for an anteversion of 25° and abduction angle of 40 just anteverted to his native transverse acetabular ligament.  There was excellent fixation of the acetabular component. A screw was drilled and placed to augment fixation of the posterior column.  At this time the neutral 40 mm liner was placed and assured to be flush at all points along the liner.  Acetabular retractors were carefully removed and a femoral neck elevator and Cobra was placed to expose the proximal femur.  A box osteotome was used to clear the lateral neck followed by canal finer.  Hip was sequentially broached to a size 5 Emphasys. The hip was trialed with a high offset trial per preoperative template.  The hip had excellent stability in position of sleep, no impingement with external rotation, flexion to 90 with 70° of internal rotation before the hip started to dislocate. At this time hip was copiously irrigated and the real size 5 high offset Actis stem was impacted into the femur.  Hip was again trialed and found have excellent stability with the + 5 femoral head.  The trial was removed and the trunnion was well dried.  The real +5 40 mm TS ceramic head was impacted on the dry trunnion.  The hip was reduced and again had excellent stability and restoration of leg lengths. Irrisept solution was used.  The hip and the remainder of the 3 L of normal saline solution.  The posterior capsule was repaired in anatomic fashion with figure-of-eight #1 Vicryl sutures. The piriformis tendon was re-approximated to the abductor tendon.The ITB band was closed with #1 Stratafix.  The subcutaneous tissues closed with interrupted 2-0 Vicryl.  The skin was closed with 3-0 Stratafix suture.  All instrument counts and sponge counts  were correct.  Patient was awoken from anesthesia in stable condition when taken to the recovery room.      I was present for the entire procedure, A qualified resident physician was not available and A physician assistant was required during the procedure for retraction tissue handling,dissection and suturing     Patient Disposition:  PACU      53M s/p L CHRISTOPHER 12/21  - multi-modal pain control  - ancef pre-op, duricef post op x 5 days as planned same day discharge   - DVT ppx: aspirin 81mg BID x 4 weeks  - PT/OT  - WBAT  - posterior hip precautions  - f/u 10-14 days       SIGNATURE: Kenneth Grayson,   DATE: December 21, 2023  TIME: 7:14 PM

## 2023-12-22 NOTE — PLAN OF CARE
Problem: OCCUPATIONAL THERAPY ADULT  Goal: Performs self-care activities at highest level of function for planned discharge setting.  See evaluation for individualized goals.  Description: Treatment Interventions: ADL retraining, Functional transfer training, UE strengthening/ROM, Endurance training, Equipment evaluation/education, Patient/family training, Cognitive reorientation, Compensatory technique education, Activityengagement, Energy conservation  Equipment Recommended: Hip Kit ($), Shower transfer bench ($$) (Pt provided with hip kit at end of session. Pt educated on where to purchase transfer tub bench.)       See flowsheet documentation for full assessment, interventions and recommendations.   12/21/2023 1919 by Joanna Vazquez OT  Outcome: Adequate for Discharge  Note: Limitation: Decreased ADL status, Decreased UE ROM, Decreased UE strength, Decreased Safe judgement during ADL, Decreased cognition, Decreased endurance, Decreased self-care trans, Decreased high-level ADLs  Prognosis: Fair  Assessment: Pt greeted bedside for OT treatment on 12/21/23 focusing on maximizing independence with ADLs. Pt greeted in bathroom and Pt able to complete toileting routine with S. Pt S with functional transfers and S with functional mobility with RW. Limitations that impact functional performance include decreased ADL status, decreased UE ROM, decreased UE strength, decreased safe judgement during ADLs, decreased cognition, decreased endurance, decreased self care transfers, decreased high level ADLs, and pain. Occupational performance areas to address ADL retraining, functional transfer training, UE strengthening/ROM, endurance training, cognitive reorientation, Pt/caregiver education, equipment evaluation/education, compensatory technique education, energy conservation, and activity engagement . Pt would benefit from continued skilled OT services while in hospital to maximize independence with ADLs. Will  continue to follow Pt's goals and progress. Pt would benefit from returning home with increased social support upon DC to maximize safety and independence with ADLs and functional tasks of choice.     Rehab Resource Intensity Level, OT: No post-acute rehabilitation needs       12/21/2023 1905 by Joanna Vazquez OT  Note: Limitation: Decreased ADL status, Decreased UE ROM, Decreased UE strength, Decreased Safe judgement during ADL, Decreased cognition, Decreased endurance, Decreased self-care trans, Decreased high-level ADLs  Prognosis: Fair  Assessment: Pt is a 53 y.o. male who presented to [facility] on 12/21/2023 with OA of L hip. Pt s/p L CHRISTOPHER on 12/21/2023. Pt WBAT on LLE and with LLE posterior hip precautions. Pt  has a past medical history of Allergy-induced asthma, Colon polyp, and PONV (postoperative nausea and vomiting). Pt greeted bedside for OT evaluation on 12/21/23. Pt resides alone in a one level 2nd floor apartment with full flight of stairs to enter. PTA, Pt reports being I with ADLs/IADLs/no AD/ +. Pt with supportive friend able to assist with transportation and ADLs upon DC. Pt demonstrating the following occupational performance levels: S with UB ADLs, max A with LB ADLs, min A with bed mobility, S with functional transfers and S with functional mobility with RW. Limitations that impact functional performance include decreased ADL status, decreased UE ROM, decreased UE strength, decreased safe judgement during ADLs, decreased cognition, decreased self care transfers, decreased high level ADLs, and pain. Occupational performance areas to address ADL retraining, functional transfer training, UE strengthening/ROM, endurance training, cognitive reorientation, Pt/caregiver education, equipment evaluation/education, compensatory technique education, energy conservation, and activity engagement . Pt would benefit from continued skilled OT services while in hospital to maximize independence with ADLs.  Will continue to follow Pt's progress. Pt would benefit from returning home with increased social support (PENDING PROGRESS) upon DC to maximize safety and independence with ADLs and functional tasks of choice.     Rehab Resource Intensity Level, OT: No post-acute rehabilitation needs (PENDING PROGRESS)

## 2023-12-22 NOTE — PHYSICAL THERAPY NOTE
PHYSICAL THERAPY EVALUATION    NAME:  Emmanuel Bey  DATE: 12/21/23    AGE:   53 y.o.  Mrn:   [id number]  ADMIT DX:  Primary osteoarthritis of left hip [M16.12]    Patient Active Problem List   Diagnosis    Allergic rhinitis    Erectile dysfunction    Genital warts    Reactive airway disease    Severe obesity (BMI >= 40) ([facility])    Primary osteoarthritis of left hip    PONV (postoperative nausea and vomiting)       Past Medical History:   Diagnosis Date    Allergy-induced asthma     Colon polyp     PONV (postoperative nausea and vomiting)        Past Surgical History:   Procedure Laterality Date    ARTHROSCOPIC REPAIR ACL Left     L knee    COLONOSCOPY      NASAL SEPTUM SURGERY         Imaging Studies:  XR pelvis ap only 1 or 2 vw    (Results Pending)       Past Medical History:   Diagnosis Date    Allergy-induced asthma     Colon polyp     PONV (postoperative nausea and vomiting)      Length Of Stay: 0  Performed at least 2 patient identifiers during session: Name and Birthday  PHYSICAL THERAPY EVALUATION :        12/21/23 1554   PT Last Visit   PT Visit Date 12/21/23   Note Type   Note type Evaluation  (and treatment session)   Pain Assessment   Pain Assessment Tool 0-10   Pain Score 5   Pain Location/Orientation Orientation: Left;Location: Hip   Effect of Pain on Daily Activities limits mobility   Hospital Pain Intervention(s) Repositioned;Ambulation/increased activity;Emotional support   Restrictions/Precautions   Weight Bearing Precautions Per Order Yes   LLE Weight Bearing Per Order WBAT   Other Precautions THR;WBS;Fall Risk;Pain  (posterior hip precautions)   Home Living   Type of Home Apartment   Home Layout One level;Stairs to enter with rails   Bathroom Shower/Tub Tub/shower unit   Bathroom Toilet Raised   Bathroom Equipment Grab bars around toilet;Toilet raiser   Home Equipment Walker;Crutches  (pt has)   Additional Comments PTA, pt resides alone in 2nd floor apartment with 17 covered [address] with b/l  handrails. No steps once inside apartment. Has mechanical bed.   Prior Function   Level of [place] Independent with ADLs;Independent with functional mobility;Independent with IADLS   Lives With Alone   Receives Help From Friend(s)  (Friend, Cammie (nurse) reports she will be able to stay for at least the first 24 hours to support.)   IADLs Independent with driving;Independent with meal prep;Independent with medication management   Falls in the last 6 months 0   Vocational Full time employment  ()   Comments PTA, pt independent with ADLs/IADLs, and would use unilateral crutch with inc L hip pain PRN   General   Additional Pertinent History POD0 s/p L posterolateral CHRISTOPHER. Significant pmhx: post-op n/v   Family/Caregiver Present Yes  (friend Alida)   Cognition   Overall Cognitive Status WFL   Arousal/Participation Alert   Orientation Level Oriented X4   Memory Within functional limits   Following Commands Follows all commands and directions without difficulty   Comments Pleasant and cooperative   Subjective   Subjective Pt reporting incontinence upon PT/OT arrival. Amenable to skilled PT evaluation.   RLE Assessment   RLE Assessment WFL  (4+/5 for all major muscle groups except hip flx 3+/5 within available range limited 2/2 body habitus and hip tightness)   LLE Assessment   LLE Assessment WFL  (ankle pf/df 4+/5, able to perform LAQ at EOB. llimited SLR in supine with quad engagement)   Vision-Basic Assessment   Current Vision Wears contacts   Coordination   Movements are Fluid and Coordinated 1   Sensation WFL   Light Touch   RLE Light Touch Grossly intact   LLE Light Touch Grossly intact   Proprioception   RLE Proprioception Grossly intact   LLE Proprioception Grossly Intact   Bed Mobility   Supine to Sit 5  Supervision   Additional items Increased time required;LE management;Verbal cues;HOB elevated   Sit to Supine 4  Minimal assistance   Additional items Assist x 1;Increased time  required;Verbal cues;LE management  (bed flat)   Additional Comments sit <> supine with VC for safe technique while maintaining hip precautions; pt incontinent upon arrival   Transfers   Sit to Stand 5  Supervision   Additional items Increased time required;Verbal cues;Armrests   Stand to Sit 5  Supervision   Additional items Increased time required;Verbal cues   Car transfer 5  Supervision   Additional items Increased time required;Armrests;Verbal cues  (simulated with transport chair)   Additional Comments multiple transfers completed throughout evaluation performed from bed/chair to RW for stability. Verbal/visual demonstration provided with VC for hand placement and safe technique; pt incontinent x2 upon initial transfer   Ambulation/Elevation   Gait pattern Antalgic;Wide NATHAN;Improper Weight shift;Decreased L stance;Inconsistent scott;Excessively slow;Step to   Gait Assistance 5  Supervision   Additional items Verbal cues   Assistive Device Rolling walker   Distance 15'x2 using RW and supervision   Ambulation/Elevation Additional Comments Steady with use of RW, no gross LOB > gait abnormalities. Verbal/Visual demonstration for proper sequencing/mechanics   Balance   Static Sitting Good   Dynamic Sitting Fair +   Static Standing Fair  (with RW)   Dynamic Standing Fair -  (within RW)   Ambulatory Fair -  (with RW)   Endurance Deficit   Endurance Deficit Yes   Endurance Deficit Description fatigue; pain  (Vitals: supine /82, HR 77, SpO2 97%; /72, 83 without sxs.)   Activity Tolerance   Activity Tolerance (S)  Patient limited by fatigue;Patient limited by pain;Treatment limited secondary to medical complications (Comment);Other (Comment)  (Nausea/symptomatic hypotension; Refer to tx assessments; Vitals: Supine 139/82,HR 72, O2 97%RA; /72, 83 without sxs during eval --> extra treatment session following s/sx)   Medical Staff Made Aware Co-Eval with TOMASA Marshall RN   Nurse Made Aware RUPERT  cleared/Updated   Assessment   Prognosis Guarded   Problem List Decreased strength;Decreased range of motion;Decreased endurance;Impaired balance;Decreased mobility;Orthopedic restrictions;Decreased skin integrity;Obesity;Pain   Assessment Pt is a 53 y.o.male  presenting to [facility]  on [facility]/21/2023 for elective surgical management of Primary osteoarthritis of left hip. Pt now POD0 s/p L posterolateral CHRISTOPHER with WB status of WBAT LLE and posterior hip precautions.  Additional significant pmhx:  post-operative n/v . PT consulted to assess pt's functional mobility and d/c needs with orders for activity as tolerated and activity beginning POD0. Pt presents with acute post-operative deficits of LE strength, ROM, balance, endurance, pain, skin integrity, obesity, and WBS with orthopedic restrictions that limit functional mobility and activity tolerance relative to baseline.  PTA, pt amb with independently with intermittent use of unilateral crutch PRN for hip pain with prolonged standing community distances and was independent with ADLs with inc time. Pt has 17 [address] 2nd floor apartment with bilateral handrail. Post surgically, pt functioning at supervision-min Ax1 for bed mobility with LE assist, supervision for functional transfers and amb with RW for limited household distances. Pt noted to be incontinent of bladder upon PT/OT arrival and x2 upon standing at bedside. Intervention provided with emphasis on early mobility while maintaining hip precautions, gait mechanics,  and education on fall risk and post-surgical management to increase independence and safety.  Pt vitals stable during initial evaluation with cc/o inc LLE pain with mobility, however please refer to additional treatment session assessment. Please refer to flowsheet for additional description of objective findings. Following intervention, pt demonstrated ability to safely ambulate limited household distances with use of RW and  complete functional transfers while maintaining hip precautions, also able to recite 3/3 precautions. Pt would benefit from additional skilled IP PT session to address above deficits and requires further assessment of ability to negotiate stairs prior to d/c. The patient's AM-PAC Basic Mobility Inpatient Short Form Raw Score is 18. A Raw score of greater than 16 suggests the patient may benefit from discharge to home. Please also refer to the recommendation of the Physical Therapist for safe discharge planning.    From PT/mobility perspective, pt is not appropriate for d/c at this time. Current PT recommendations for DME include RW. PT recommendations for d/c are level III  ( MIN ) rehab intensity resources to address pt deficits and promote return to independent and active lifestyle with support of friend and RW pending progress when medically ready and cleared for safe mobility by PT. Plan to perform stair negotiation next session. Please refer to additional treatment session assessment noted below.   Barriers to Discharge Other (Comment);Inaccessible home environment  (Activity tolerance with symptomatic drop in BP)   Goals   Patient Goals To go home   STG Expiration Date 12/31/23   Short Term Goal #1 1).  Pt will perform bed mobility with Luz Elena demonstrating appropriate technique 100% of the time in order to improve function.2)  Perform all transfers with Luz Elena demonstrating safe and appropriate technique 100% of the time in order to improve ability to negotiate safely in home environment.3) Amb with least restrictive AD > 100'x1 with mod I in order to demonstrate ability to negotiate in home environment.4)  Improve overall strength and balance 1/2 grade in order to optimize ability to perform functional tasks and reduce fall risk.5) Increase activity tolerance to 45 minutes in order to improve endurance to functional tasks.6)  Negotiate flight of stairs using most appropriate technique and S in order to be able  to negotiate safely in home environment. 7) PT for ongoing patient and family/caregiver education, DME needs and d/c planning in order to promote highest level of function in least restrictive environment.   PT Treatment Day 1   Plan   Treatment/Interventions Functional transfer training;LE strengthening/ROM;Elevations;Therapeutic exercise;Endurance training;Patient/family training;Equipment eval/education;Bed mobility;Gait training;Continued evaluation;Spoke to nursing;OT   PT Frequency Twice a day  (QD-BID)   Discharge Recommendation   Rehab Resource Intensity Level, PT III (Minimum Resource Intensity)   Equipment Recommended Walker  (pt has)   AM-PAC Basic Mobility Inpatient   Turning in Flat Bed Without Bedrails 3   Lying on Back to Sitting on Edge of Flat Bed Without Bedrails 3   Moving Bed to Chair 3   Standing Up From Chair Using Arms 3   Walk in Room 3   Climb 3-5 Stairs With Railing 3   Basic Mobility Inpatient Raw Score 18   Basic Mobility Standardized Score 41.05   Highest Level Of Mobility   JH-HLM Goal 6: Walk 10 steps or more   JH-HLM Achieved 7: Walk 25 feet or more   Additional Treatment Session   Start Time 1620   End Time 1646   Treatment Assessment Pt tolerated treatment session with symptomatic drop in BP down to 78/59 while seated at EOB, s/sx of nausea, diaphoretic, and dizziness > Pt required skilled assistance from OT with Max Ax3 to return supine while maintaining hip precautions. Intervention provided included therapeutic exercise in supine to promote circulation and sx management, education on potential post-procedural complications and functional strategies/impact on mobility. Following ~10 minutes in supine, pt BP inc to 115/63 with symptoms resolved. Pt  progressed to mod Ax1 for supine to sit and then min Ax1 with LE assist, min A for transfers and supervision for short distance ambulation from chair to bed. Pt unsafe to attempt stairs at this time. Pt will require additional skilled  IPPT session to assess stair negotiation. Currently unsafe to d/c to home 2/2. Cont to recommend level III (min) rehab intensity resources pending progress with support of friend when medically ready and cleared for safe mobility by PT.   Equipment Use EOB with sxs of orthostatic hypotension (supine: 78/59, HR 70, O2 96%; supine sxs resolved 115/63, HR 71, /73, HR 86; in PACU supine 107/58 without sxs); max Ax3 sit to supine; performed heel slides, ankle pumps, and fist squeezes to promote circulation; educated pt on post-surgical effects of anesthesia, BP management, and functional strategies with position changes while maintaining hip precautions. Sxs resolved with prolonged rest break in supine with inc in BP. Mod Ax1 for supine to sit using elbow propping and then min Ax1 for transfers and bed mobility onto stretcher 2/2 LE management. Supervision for limited amb from table to chair and chair to stretcher for safety   Additional Treatment Day 1   End of Consult   Patient Position at End of Consult Supine;All needs within reach;Other (comment)  (RN aware of pt performance and sxs.)   End of Consult Comments Pt not safe for d/c from mobility standpoint at this time.       Pt requires PT/OT  co-eval and co-treat  due to unpredictable medical complexity, safety concerns, fall risk, significant assistance with mobility relative to baseline    (Please find full objective findings from PT assessment regarding body systems outlined above).     Hx/personal factors: inaccessible home, dec caregiver support, use of AD, pain, WB restrictions, total hip precautions, recent orthopedic procedure , fall risk, obesity, and post-operative n/v , Examination:  impairments in systems including multiple body structures involved; musculoskeletal (ROM, strength, posture, BMI, pain), neuromuscular (balance, gait, transfers), joint integrity, integumentary (skin integrity, presence of scars or wounds, post-surgical incision),  cardiopulmonary (vitals); activity limitations (difficulties executing an action); participation restrictions (problems associated w involvement in life situations), am-pac score   Clinical: unpredictable (ongoing medical status, risk for falls, POD #0, and pain mgt)  Complexity: high         Robert Redd, PT,DPT   8:04 PM  12/21/23

## 2023-12-22 NOTE — OCCUPATIONAL THERAPY NOTE
Occupational Therapy Progress Note     Patient Name: Emmanuel Bey  Today's Date: 12/21/2023  Problem List  Principal Problem:    Primary osteoarthritis of left hip  Active Problems:    PONV (postoperative nausea and vomiting)            12/21/23 1850   OT Last Visit   OT Visit Date 12/21/23   Note Type   Note Type Treatment   Pain Assessment   Pain Assessment Tool 0-10   Pain Score 8   Pain Location/Orientation Orientation: Left;Location: Hip   Restrictions/Precautions   Weight Bearing Precautions Per Order Yes   LLE Weight Bearing Per Order WBAT   Other Precautions WBS;THR;Pain;Fall Risk  (L THP posterior)   Lifestyle   Autonomy I with ADLs/IADLs/no AD/ +/works FT   Reciprocal Relationships Supportive spouse   Service to Others FTE   Intrinsic Gratification Goal is to hike 5 miles   ADL   Where Assessed Other (Comment)  (bathroom)   Toileting Assistance  5  Supervision/Setup   Toileting Deficit Increased time to complete;Setup;Grab bar use   Bed Mobility   Additional Comments Pt greeted in bathroom.   Transfers   Sit to Stand 5  Supervision   Additional items Increased time required;Verbal cues   Stand to Sit 5  Supervision   Additional items Increased time required;Verbal cues   Additional Comments with RW   Functional Mobility   Functional Mobility 5  Supervision   Additional Comments S with functional mobility with RW- household distances   Additional items Rolling walker   Cognition   Overall Cognitive Status WFL   Arousal/Participation Alert;Cooperative   Attention Within functional limits   Orientation Level Oriented X4   Memory Within functional limits   Following Commands Follows all commands and directions without difficulty   Comments Pt very pleasant and cooperative during OT session.   Activity Tolerance   Activity Tolerance Patient tolerated treatment well   Medical Staff Made Aware RN cleared/updated. Portions of Tx completed with EVIN Jones 2* to Pt's medical complexity, need for dispo  planning, and decreased endurance. OT focus on maximizing independence with ADLs.   Assessment   Assessment Pt greeted bedside for OT treatment on 12/21/23 focusing on maximizing independence with ADLs. Pt greeted in bathroom and Pt able to complete toileting routine with S. Pt S with functional transfers and S with functional mobility with RW. Limitations that impact functional performance include decreased ADL status, decreased UE ROM, decreased UE strength, decreased safe judgement during ADLs, decreased cognition, decreased endurance, decreased self care transfers, decreased high level ADLs, and pain. Occupational performance areas to address ADL retraining, functional transfer training, UE strengthening/ROM, endurance training, cognitive reorientation, Pt/caregiver education, equipment evaluation/education, compensatory technique education, energy conservation, and activity engagement . Pt would benefit from continued skilled OT services while in hospital to maximize independence with ADLs. Will continue to follow Pt's goals and progress. Pt would benefit from returning home with increased social support upon DC to maximize safety and independence with ADLs and functional tasks of choice.   Plan   Treatment Interventions ADL retraining;Functional transfer training;Endurance training;Cognitive reorientation;UE strengthening/ROM;Patient/family training;Equipment evaluation/education;Compensatory technique education;Activityengagement;Energy conservation   Goal Expiration Date 12/28/23   OT Treatment Day 1   OT Frequency 3-5x/wk;Other (comment)  (BID PRN)   Discharge Recommendation   Rehab Resource Intensity Level, OT No post-acute rehabilitation needs   Equipment Recommended Hip Kit ($);Shower transfer bench ($$)   Additional Comments 2 The patient's raw score on the -PAC Daily Activity Inpatient Short Form is 19. A raw score of greater than or equal to 19 suggests the patient may benefit from discharge to  home. Please refer to the recommendation of the Occupational Therapist for safe discharge planning.   AM-PAC Daily Activity Inpatient   Lower Body Dressing 3   Bathing 3   Toileting 3   Upper Body Dressing 3   Grooming 3   Eating 4   Daily Activity Raw Score 19   Daily Activity Standardized Score (Calc for Raw Score >=11) 40.22   AM-PAC Applied Cognition Inpatient   Following a Speech/Presentation 4   Understanding Ordinary Conversation 4   Taking Medications 4   Remembering Where Things Are Placed or Put Away 4   Remembering List of 4-5 Errands 4   Taking Care of Complicated Tasks 4   Applied Cognition Raw Score 24   Applied Cognition Standardized Score 62.21   End of Consult   Education Provided Yes   Patient Position at End of Consult All needs within reach;Other (comment)  (Seated in [place]- Hand off to PT)   Nurse Communication Nurse aware of consult         Joanna Vazquez MS, OTR/L

## 2023-12-26 ENCOUNTER — OFFICE VISIT (OUTPATIENT)
Dept: PHYSICAL THERAPY | Facility: MEDICAL CENTER | Age: 53
End: 2023-12-26
Payer: COMMERCIAL

## 2023-12-26 DIAGNOSIS — Z96.642 STATUS POST TOTAL REPLACEMENT OF LEFT HIP: Primary | ICD-10-CM

## 2023-12-26 DIAGNOSIS — M16.12 PRIMARY OSTEOARTHRITIS OF LEFT HIP: ICD-10-CM

## 2023-12-26 PROCEDURE — 97161 PT EVAL LOW COMPLEX 20 MIN: CPT | Performed by: PHYSICAL THERAPIST

## 2023-12-26 PROCEDURE — 97110 THERAPEUTIC EXERCISES: CPT | Performed by: PHYSICAL THERAPIST

## 2023-12-26 PROCEDURE — 97140 MANUAL THERAPY 1/> REGIONS: CPT | Performed by: PHYSICAL THERAPIST

## 2023-12-27 ENCOUNTER — APPOINTMENT (OUTPATIENT)
Dept: PHYSICAL THERAPY | Facility: MEDICAL CENTER | Age: 53
End: 2023-12-27
Payer: COMMERCIAL

## 2023-12-29 ENCOUNTER — OFFICE VISIT (OUTPATIENT)
Dept: PHYSICAL THERAPY | Facility: MEDICAL CENTER | Age: 53
End: 2023-12-29
Payer: COMMERCIAL

## 2023-12-29 DIAGNOSIS — M16.12 PRIMARY OSTEOARTHRITIS OF LEFT HIP: ICD-10-CM

## 2023-12-29 DIAGNOSIS — Z96.642 STATUS POST TOTAL REPLACEMENT OF LEFT HIP: Primary | ICD-10-CM

## 2023-12-29 PROCEDURE — 97110 THERAPEUTIC EXERCISES: CPT | Performed by: PHYSICAL THERAPIST

## 2023-12-29 PROCEDURE — 97140 MANUAL THERAPY 1/> REGIONS: CPT | Performed by: PHYSICAL THERAPIST

## 2023-12-29 PROCEDURE — 97112 NEUROMUSCULAR REEDUCATION: CPT | Performed by: PHYSICAL THERAPIST

## 2023-12-29 NOTE — PROGRESS NOTES
"Daily Note     Today's date: 2023  Patient name: Emmanuel Bey  : 1970  MRN: [id number]  Referring provider: Hannah Wong PA-C  Dx:   Encounter Diagnosis     ICD-10-CM    1. Status post total replacement of left hip  Z96.642       2. Primary osteoarthritis of left hip  M16.12                      Subjective: Patient states that he is doing well.      Objective: See treatment diary below.      Assessment:  Patient demonstrates the ability to ambulate without an AD.  Tolerated treatment well.  Patient would benefit from continued PT.      Plan: Continue per plan of care.      Precautions: WBAT      Manuals            PROM AZ AZ                                                  Neuro Re-Ed             TA isometrics 3x10 5s 3x10 5s                                                                                         Ther Ex             Bike             Heel slides 3x10 30x           Sup hip abd 3x10 30x           Glute sets 3x10 5s 3x10 5s           Quad sets 3x10 5s 3x10 5s           Hip add 2x10 5s 3x10 5s           Bridges  2x10           LAQ 3x10 3x12           Weight shifts X X           Heel raises 3x10 3x10           SL balance  5x to failure           Ther Activity                                       Gait Training  S/ AD           Stair negotiation  3x6 4\" up L/ down L                        Modalities              10' 10'                             "

## 2024-01-02 ENCOUNTER — OFFICE VISIT (OUTPATIENT)
Dept: OBGYN CLINIC | Facility: MEDICAL CENTER | Age: 54
End: 2024-01-02

## 2024-01-02 ENCOUNTER — APPOINTMENT (OUTPATIENT)
Dept: RADIOLOGY | Facility: MEDICAL CENTER | Age: 54
End: 2024-01-02
Payer: COMMERCIAL

## 2024-01-02 VITALS
DIASTOLIC BLOOD PRESSURE: 77 MMHG | HEIGHT: 70 IN | BODY MASS INDEX: 40.57 KG/M2 | HEART RATE: 92 BPM | SYSTOLIC BLOOD PRESSURE: 124 MMHG | WEIGHT: 283.4 LBS

## 2024-01-02 DIAGNOSIS — Z47.1 AFTERCARE FOLLOWING LEFT HIP JOINT REPLACEMENT SURGERY: ICD-10-CM

## 2024-01-02 DIAGNOSIS — Z47.1 AFTERCARE FOLLOWING LEFT HIP JOINT REPLACEMENT SURGERY: Primary | ICD-10-CM

## 2024-01-02 DIAGNOSIS — Z96.642 AFTERCARE FOLLOWING LEFT HIP JOINT REPLACEMENT SURGERY: Primary | ICD-10-CM

## 2024-01-02 DIAGNOSIS — Z96.642 AFTERCARE FOLLOWING LEFT HIP JOINT REPLACEMENT SURGERY: ICD-10-CM

## 2024-01-02 PROCEDURE — 73502 X-RAY EXAM HIP UNI 2-3 VIEWS: CPT

## 2024-01-02 PROCEDURE — 99024 POSTOP FOLLOW-UP VISIT: CPT | Performed by: STUDENT IN AN ORGANIZED HEALTH CARE EDUCATION/TRAINING PROGRAM

## 2024-01-02 RX ORDER — MULTIVITAMIN
1 TABLET ORAL DAILY
COMMUNITY
Start: 2023-11-21

## 2024-01-02 NOTE — PROGRESS NOTES
Subjective: 53 y.o. male presents to the office 2 weeks s/p left total hip arthroplasty performed on 12/21/2023. He has been compliant with his hip precautions. Patient seen and examined. Pain controlled with celebrex. Progressing very well, ambulates independently without assistive device. Incision without drainage. Denies fevers or chills. He does note some left knee pain.     Physical Exam:  Incision: clean, dry, and intact  ROM: as expected without pain  5/5 IP/Q/HS/TA/GS, 2+ DP/PT, SILT DP/SP/S/S/TN    XR left hip: s/p press-fit total hip arthroplasty, components in good position. No fracture or dislocation    Assessment/Plan:  2 weeks s/p left total hip arthroplasty performed on 12/21/2023    - continue multi-modal pain control   - may use voltaren gel on the left knee, prescription provided  - Weight bearing status: as tolerated  - DVT ppx: aspirin 81 mg BID for an additional 2 weeks (4 weeks total)  - Incision care: avoid submerging and scrubbing  - Continue PT/OT exercises  - Return to work note provided  - F/U in 4 weeks    Scribe Attestation      I,:  Marlen Mendez am acting as a scribe while in the presence of the attending physician.:       I,:  Kenneth Grayson DO personally performed the services described in this documentation    as scribed in my presence.:

## 2024-01-02 NOTE — LETTER
January 2, 2024     Patient: Emmanuel Bey  YOB: 1970  Date of Visit: 1/2/2024      To Whom it May Concern:    Emmanuel Bey is under my professional care. Emmanuel was seen in my office on 1/2/2024. Emmanuel may return to work on 01/08/2024 without restriction .    If you have any questions or concerns, please don't hesitate to call.         Sincerely,          Kenneth Grayson, DO

## 2024-01-03 ENCOUNTER — OFFICE VISIT (OUTPATIENT)
Dept: PHYSICAL THERAPY | Facility: MEDICAL CENTER | Age: 54
End: 2024-01-03
Payer: COMMERCIAL

## 2024-01-03 DIAGNOSIS — M16.12 PRIMARY OSTEOARTHRITIS OF LEFT HIP: ICD-10-CM

## 2024-01-03 DIAGNOSIS — Z96.642 STATUS POST TOTAL REPLACEMENT OF LEFT HIP: Primary | ICD-10-CM

## 2024-01-03 PROCEDURE — 97112 NEUROMUSCULAR REEDUCATION: CPT | Performed by: PHYSICAL THERAPIST

## 2024-01-03 PROCEDURE — 97110 THERAPEUTIC EXERCISES: CPT | Performed by: PHYSICAL THERAPIST

## 2024-01-03 PROCEDURE — 97140 MANUAL THERAPY 1/> REGIONS: CPT | Performed by: PHYSICAL THERAPIST

## 2024-01-03 NOTE — PROGRESS NOTES
"Daily Note     Today's date: 1/3/2024  Patient name: Emmanuel Bey  : 1970  MRN: [id number]  Referring provider: Hannah Wong PA-C  Dx:   Encounter Diagnosis     ICD-10-CM    1. Status post total replacement of left hip  Z96.642       2. Primary osteoarthritis of left hip  M16.12                      Subjective:  Patient states that he is doing well.      Objective: See treatment diary below.      Assessment:  Patient demonstrates good tolerance to progressions without pain.  Tolerated treatment well. Patient would benefit from continued PT.      Plan: Continue per plan of care.      Precautions: WBAT      Manuals 12/26 12/29 1/3          PROM AZ AZ AZ                                                 Neuro Re-Ed             TA isometrics 3x10 5s 3x10 5s 3x10 5s          Marches   3x10                                                                           Ther Ex             Bike   5'          Heel slides 3x10 30x 30x          Sup hip abd 3x10 30x 30x          Glute sets 3x10 5s 3x10 5s 3x10 5s          Quad sets 3x10 5s 3x10 5s 3x10 5s          Hip add 2x10 5s 3x10 5s 3x10 5s          Hip abd   3x10 5s green          Bridges  2x10 3x10          LAQ 3x10 3x12 3x15          Weight shifts X X X          Heel raises 3x10 3x10 3x10          SL balance  5x to failure 10x to failure          Ther Activity                                       Gait Training  S/ AD S/ AD          Stair negotiation  3x6 4\" up L/ down L 3x10 6\" up L/ down L                       Modalities              10' 10' 10'                              "

## 2024-01-05 ENCOUNTER — OFFICE VISIT (OUTPATIENT)
Dept: PHYSICAL THERAPY | Facility: MEDICAL CENTER | Age: 54
End: 2024-01-05
Payer: COMMERCIAL

## 2024-01-05 DIAGNOSIS — M16.12 PRIMARY OSTEOARTHRITIS OF LEFT HIP: ICD-10-CM

## 2024-01-05 DIAGNOSIS — Z96.642 STATUS POST TOTAL REPLACEMENT OF LEFT HIP: Primary | ICD-10-CM

## 2024-01-05 PROCEDURE — 97112 NEUROMUSCULAR REEDUCATION: CPT | Performed by: PHYSICAL THERAPIST

## 2024-01-05 PROCEDURE — 97110 THERAPEUTIC EXERCISES: CPT | Performed by: PHYSICAL THERAPIST

## 2024-01-05 PROCEDURE — 97140 MANUAL THERAPY 1/> REGIONS: CPT | Performed by: PHYSICAL THERAPIST

## 2024-01-05 NOTE — PROGRESS NOTES
"Daily Note     Today's date: 2024  Patient name: Emmanuel Bey  : 1970  MRN: [id number]  Referring provider: Hannah Wong PA-C  Dx:   Encounter Diagnosis     ICD-10-CM    1. Status post total replacement of left hip  Z96.642       2. Primary osteoarthritis of left hip  M16.12                      Subjective: Patient states that he feels good.      Objective: See treatment diary below.      Assessment:  Patient demonstrates good tolerance to progressions without pain.  Tolerated treatment well. Patient would benefit from continued PT.      Plan: Continue per plan of care.      Precautions: WBAT      Manuals 12/26 12/29 1/3 1/5         PROM AZ AZ AZ AZ                                                Neuro Re-Ed             TA isometrics 3x10 5s 3x10 5s 3x10 5s 3x10 5s         Marches   3x10 3x10                                                                          Ther Ex             Bike   5' 10'         Heel slides 3x10 30x 30x 30x         Sup hip abd 3x10 30x 30x 30x         Glute sets 3x10 5s 3x10 5s 3x10 5s 3x10 5s         Quad sets 3x10 5s 3x10 5s 3x10 5s 3x10 5s         Hip add 2x10 5s 3x10 5s 3x10 5s 3x10 5s         Hip abd   3x10 5s green 3x10 5s blue         Bridges  2x10 3x10 3x10 5s         LAQ 3x10 3x12 3x15 3x10 2# 5s         Weight shifts X X X D/c         Heel raises 3x10 3x10 3x10 3x12         SL balance  5x to failure 10x to failure 10x to failure         Standing hamstring curls    3x10          Standing hip abd    10x ea         Standing hip ext    10x ea         Squats    2x10 to high box c/ airex         Ther Activity                                       Gait Training  S/ AD S/ AD          Stair negotiation  3x6 4\" up L/ down L 3x10 6\" up L/ down L 3x10 6\" up L/ down L                      Modalities              10' 10' 10' dec                               "

## 2024-01-09 ENCOUNTER — OFFICE VISIT (OUTPATIENT)
Dept: PHYSICAL THERAPY | Facility: MEDICAL CENTER | Age: 54
End: 2024-01-09
Payer: COMMERCIAL

## 2024-01-09 DIAGNOSIS — Z96.642 STATUS POST TOTAL REPLACEMENT OF LEFT HIP: Primary | ICD-10-CM

## 2024-01-09 DIAGNOSIS — M16.12 PRIMARY OSTEOARTHRITIS OF LEFT HIP: ICD-10-CM

## 2024-01-09 PROCEDURE — 97110 THERAPEUTIC EXERCISES: CPT | Performed by: PHYSICAL THERAPIST

## 2024-01-09 PROCEDURE — 97140 MANUAL THERAPY 1/> REGIONS: CPT | Performed by: PHYSICAL THERAPIST

## 2024-01-09 PROCEDURE — 97112 NEUROMUSCULAR REEDUCATION: CPT | Performed by: PHYSICAL THERAPIST

## 2024-01-09 NOTE — PROGRESS NOTES
"Daily Note     Today's date: 2024  Patient name: Emmanuel Bey  : 1970  MRN: [id number]  Referring provider: Hannah Wong PA-C  Dx:   Encounter Diagnosis     ICD-10-CM    1. Status post total replacement of left hip  Z96.642       2. Primary osteoarthritis of left hip  M16.12                      Subjective:  Patient states that he feels great.      Objective: See treatment diary below.      Assessment:  Patient demonstrates good tolerance to all progressions.  Tolerated treatment well. Patient would benefit from continued PT      Plan: Continue per plan of care.      Precautions: WBAT      Manuals 12/26 12/29 1/3 1/5 1/9        PROM AZ AZ AZ AZ AZ                                               Neuro Re-Ed             TA isometrics 3x10 5s 3x10 5s 3x10 5s 3x10 5s 3x10 5s        Marches   3x10 3x10 2x20                                                                         Ther Ex             Bike   5' 10' 10'        Heel slides 3x10 30x 30x 30x 30x        Sup hip abd 3x10 30x 30x 30x 30x        Glute sets 3x10 5s 3x10 5s 3x10 5s 3x10 5s 3x10 5s         Quad sets 3x10 5s 3x10 5s 3x10 5s 3x10 5s 3x10 5s        Hip add 2x10 5s 3x10 5s 3x10 5s 3x10 5s 3x10 5s        Hip abd   3x10 5s green 3x10 5s blue 3x10 5s blue        Bridges  2x10 3x10 3x10 5s 2x10 ball        LAQ 3x10 3x12 3x15 3x10 2# 5s 3x10 2# 5s        Weight shifts X X X D/c         Heel raises 3x10 3x10 3x10 3x12 3x15        SL balance  5x to failure 10x to failure 10x to failure 10x to failure airex        Standing hamstring curls    3x10  3x12        Standing hip abd    10x ea 10x ea        Standing hip ext    10x ea 10x ea        Squats    2x10 to high box c/ airex 3x10 to high box c/ airex        Ther Activity                                       Gait Training  S/ AD S/ AD          Stair negotiation  3x6 4\" up L/ down L 3x10 6\" up L/ down L 3x10 6\" up L/ down L 3x10 6\" up L/ down L                     Modalities              10' 10' " 10' dec 10'

## 2024-01-12 ENCOUNTER — OFFICE VISIT (OUTPATIENT)
Dept: PHYSICAL THERAPY | Facility: MEDICAL CENTER | Age: 54
End: 2024-01-12
Payer: COMMERCIAL

## 2024-01-12 DIAGNOSIS — M16.12 PRIMARY OSTEOARTHRITIS OF LEFT HIP: ICD-10-CM

## 2024-01-12 DIAGNOSIS — Z96.642 STATUS POST TOTAL REPLACEMENT OF LEFT HIP: Primary | ICD-10-CM

## 2024-01-12 PROCEDURE — 97140 MANUAL THERAPY 1/> REGIONS: CPT | Performed by: PHYSICAL THERAPIST

## 2024-01-12 PROCEDURE — 97110 THERAPEUTIC EXERCISES: CPT | Performed by: PHYSICAL THERAPIST

## 2024-01-12 PROCEDURE — 97112 NEUROMUSCULAR REEDUCATION: CPT | Performed by: PHYSICAL THERAPIST

## 2024-01-12 RX ORDER — SALICYLIC ACID 40 %
81 ADHESIVE PATCH, MEDICATED TOPICAL 2 TIMES DAILY
Qty: 180 TABLET | Refills: 1 | Status: SHIPPED | OUTPATIENT
Start: 2024-01-12

## 2024-01-12 NOTE — PROGRESS NOTES
Daily Note     Today's date: 2024  Patient name: Emmanuel Bey  : 1970  MRN: [id number]  Referring provider: Hannah Wong PA-C  Dx:   Encounter Diagnosis     ICD-10-CM    1. Status post total replacement of left hip  Z96.642       2. Primary osteoarthritis of left hip  M16.12                      Subjective:  Patient states that he feels good.      Objective: See treatment diary below.      Assessment:  Patient demonstrates good tolerance to progressions without pain.  Tolerated treatment well. Patient would benefit from continued PT.      Plan: Continue per plan of care.      Precautions: WBAT      Manuals 12/26 12/29 1/3 1/5 1/9 1/12       PROM AZ AZ AZ AZ AZ AZ                                              Neuro Re-Ed             TA isometrics 3x10 5s 3x10 5s 3x10 5s 3x10 5s 3x10 5s 3x10 5s       Marches   3x10 3x10 2x20 3x20                                                                        Ther Ex             Bike   5' 10' 10' 10'       Heel slides 3x10 30x 30x 30x 30x 30x       Sup hip abd 3x10 30x 30x 30x 30x 30x       Glute sets 3x10 5s 3x10 5s 3x10 5s 3x10 5s 3x10 5s  3x10 5s       Quad sets 3x10 5s 3x10 5s 3x10 5s 3x10 5s 3x10 5s 3x10 5s       Hip add 2x10 5s 3x10 5s 3x10 5s 3x10 5s 3x10 5s 3x10 5s       Hip abd   3x10 5s green 3x10 5s blue 3x10 5s blue 3x12 5s blue       Bridges  2x10 3x10 3x10 5s 2x10 ball 2x10 ball       Clams      2x10       SLRs      3x5       LAQ 3x10 3x12 3x15 3x10 2# 5s 3x10 2# 5s 3x10 3# 5s       Weight shifts X X X D/c         Heel raises 3x10 3x10 3x10 3x12 3x15 3x15       SL balance  5x to failure 10x to failure 10x to failure 10x to failure airex 15x to failure airex       Standing hamstring curls    3x10  3x12 3x12       Standing hip abd    10x ea 10x ea 12x ea       Standing hip ext    10x ea 10x ea 12x ea       Squats    2x10 to high box c/ airex 3x10 to high box c/ airex 3x10 to high box c/ airex       Ther Activity                                      "  Gait Training  S/ AD S/ AD          Stair negotiation  3x6 4\" up L/ down L 3x10 6\" up L/ down L 3x10 6\" up L/ down L 3x10 6\" up L/ down L 3x10 8\" up L/ down L                    Modalities              10' 10' 10' dec 10' 10'                                 "

## 2024-01-16 ENCOUNTER — OFFICE VISIT (OUTPATIENT)
Dept: PHYSICAL THERAPY | Facility: MEDICAL CENTER | Age: 54
End: 2024-01-16
Payer: COMMERCIAL

## 2024-01-16 DIAGNOSIS — M16.12 PRIMARY OSTEOARTHRITIS OF LEFT HIP: ICD-10-CM

## 2024-01-16 DIAGNOSIS — Z96.642 STATUS POST TOTAL REPLACEMENT OF LEFT HIP: Primary | ICD-10-CM

## 2024-01-16 PROCEDURE — 97112 NEUROMUSCULAR REEDUCATION: CPT | Performed by: PHYSICAL THERAPIST

## 2024-01-16 PROCEDURE — 97140 MANUAL THERAPY 1/> REGIONS: CPT | Performed by: PHYSICAL THERAPIST

## 2024-01-16 PROCEDURE — 97110 THERAPEUTIC EXERCISES: CPT | Performed by: PHYSICAL THERAPIST

## 2024-01-16 NOTE — PROGRESS NOTES
Daily Note     Today's date: 2024  Patient name: Emmanuel Bey  : 1970  MRN: [id number]  Referring provider: Hannah Wong PA-C  Dx:   Encounter Diagnosis     ICD-10-CM    1. Status post total replacement of left hip  Z96.642       2. Primary osteoarthritis of left hip  M16.12                      Subjective:  Patient states that his hip feels great.  His right ankle is the most limiting factor at this time d/t acute pain.      Objective: See treatment diary below.      Assessment:  Patient demonstrates good tolerance to progressions and continues to make excellent progress.  Tolerated treatment well. Patient would benefit from continued PT.      Plan: Continue per plan of care.      Precautions: WBAT      Manuals 12/26 12/29 1/3 1/5 1/9 1/12 1/16      PROM AZ AZ AZ AZ AZ AZ AZ                                             Neuro Re-Ed             TA isometrics 3x10 5s 3x10 5s 3x10 5s 3x10 5s 3x10 5s 3x10 5s 3x10 5s      Marches   3x10 3x10 2x20 3x20 3x20 red                                                                       Ther Ex             Bike   5' 10' 10' 10' 10'      Heel slides 3x10 30x 30x 30x 30x 30x D/c      Sup hip abd 3x10 30x 30x 30x 30x 30x D/c      Glute sets 3x10 5s 3x10 5s 3x10 5s 3x10 5s 3x10 5s  3x10 5s D/c      Quad sets 3x10 5s 3x10 5s 3x10 5s 3x10 5s 3x10 5s 3x10 5s D/c       Hip add 2x10 5s 3x10 5s 3x10 5s 3x10 5s 3x10 5s 3x10 5s 20x5s      Hip abd   3x10 5s green 3x10 5s blue 3x10 5s blue 3x12 5s blue 3x15 5s blue      Bridges  2x10 3x10 3x10 5s 2x10 ball 2x10 ball 20x ball      Clams      2x10 3x10      SLRs      3x5 3x6      LAQ 3x10 3x12 3x15 3x10 2# 5s 3x10 2# 5s 3x10 3# 5s 3x10 3# 5s      Weight shifts X X X D/c         Heel raises 3x10 3x10 3x10 3x12 3x15 3x15 3x15      SL balance  5x to failure 10x to failure 10x to failure 10x to failure airex 15x to failure airex 15x to failure airex      Standing hamstring curls    3x10  3x12 3x12 3x15      Standing hip abd     "10x ea 10x ea 12x ea 15x ea      Standing hip ext    10x ea 10x ea 12x ea 15x ea      Squats    2x10 to high box c/ airex 3x10 to high box c/ airex 3x10 to high box c/ airex 3x10 to high box      Standing hip flexor str       5x10s      Hip flexor str EOT       5x10s      Prone quad str       3x30s      Ther Activity                                       Gait Training  S/ AD S/ AD          Stair negotiation  3x6 4\" up L/ down L 3x10 6\" up L/ down L 3x10 6\" up L/ down L 3x10 6\" up L/ down L 3x10 8\" up L/ down L 3x10 8\" up L/ down L                   Modalities              10' 10' 10' dec 10' 10' 10'                                  "

## 2024-01-17 ENCOUNTER — APPOINTMENT (OUTPATIENT)
Dept: PHYSICAL THERAPY | Facility: MEDICAL CENTER | Age: 54
End: 2024-01-17
Payer: COMMERCIAL

## 2024-01-19 ENCOUNTER — OFFICE VISIT (OUTPATIENT)
Dept: PHYSICAL THERAPY | Facility: MEDICAL CENTER | Age: 54
End: 2024-01-19
Payer: COMMERCIAL

## 2024-01-19 DIAGNOSIS — M16.12 PRIMARY OSTEOARTHRITIS OF LEFT HIP: ICD-10-CM

## 2024-01-19 DIAGNOSIS — G89.29 CHRONIC PAIN OF RIGHT ANKLE: Primary | ICD-10-CM

## 2024-01-19 DIAGNOSIS — Z96.642 STATUS POST TOTAL REPLACEMENT OF LEFT HIP: ICD-10-CM

## 2024-01-19 DIAGNOSIS — M25.571 CHRONIC PAIN OF RIGHT ANKLE: Primary | ICD-10-CM

## 2024-01-19 PROCEDURE — 97110 THERAPEUTIC EXERCISES: CPT | Performed by: PHYSICAL THERAPIST

## 2024-01-19 PROCEDURE — 97161 PT EVAL LOW COMPLEX 20 MIN: CPT | Performed by: PHYSICAL THERAPIST

## 2024-01-19 PROCEDURE — 97140 MANUAL THERAPY 1/> REGIONS: CPT | Performed by: PHYSICAL THERAPIST

## 2024-01-19 NOTE — PROGRESS NOTES
PT Evaluation     Today's date: 2024  Patient name: Emmanuel Bey  : 1970  MRN: [id number]  Referring provider: Hannah Wong PA-C  Dx:   Encounter Diagnoses   Name Primary?    Chronic pain of right ankle Yes    Status post total replacement of left hip     Primary osteoarthritis of left hip                   Assessment  Assessment details: Emmanuel Bey is a 54 y/o male who presents with complaints of chronic right ankle pain.  The patient's greatest concern is persistent swelling and not being able to ambulate without pain in the right ankle.  Primary movement impairment diagnosis of right talocrural hypomobility and midfoot hypermobility, resulting in pathoanatomical symptoms of chronic pain of right ankle, which limits his ability to perform functional activities without pain.  Pt. will benefit from skilled PT services that includes manual therapy techniques to enhance tissue extensibility, neuromuscular re-education to facilitate motor control, therapeutic exercise to increase functional mobility, and modalities prn to reduce pain and inflammation.   Impairments: abnormal muscle firing, abnormal or restricted ROM, abnormal movement, activity intolerance, impaired physical strength, lacks appropriate home exercise program, pain with function and weight-bearing intolerance  Understanding of Dx/Px/POC: good   Prognosis: good    Goals  Goals  Impairment Goals  - Pt I with initial HEP in 1-2 visits  - Improve ROM equal to contralateral side in 6 weeks  - Increase strength to at least 5/5 in all affected areas in 4-6 weeks     Functional Goals  - Increase Functional Status Measure to: goal status in 6-8 weeks  - Patient will be independent with comprehensive HEP in 6-8 weeks  - Ambulation is improved to prior level of function in 6-8 weeks  - Stair climbing is improved to prior level of function in 6-8 weeks  - Squatting is improved to prior level of function in 6-8 weeks      Plan  Patient would  benefit from: PT eval  Planned modality interventions: thermotherapy: hydrocollator packs, low level laser therapy and cryotherapy  Planned therapy interventions: joint mobilization, manual therapy, neuromuscular re-education, patient education, strengthening, stretching, therapeutic activities, therapeutic exercise, graded exercise, graded activity, flexibility, abdominal trunk stabilization and balance/weight bearing training  Frequency: 2x week  Duration in weeks: 8  Treatment plan discussed with: patient    Subjective Evaluation    History of Present Illness  Mechanism of injury: Patient states that he has ongoing right ankle pain that has been chronic for at least a year and a half.  He does not recall a specific JOÃO.  He reports that he was taking anti-inflammatories post L CHRISTOPHER, which decreased the pain.  He notes swelling at the lateral malleolar region.  Patient reports pain c/ standing and walking.  He would like to be able to hike 5 miles without pain and without having to take anti-inflammatories.    Patient Goals  Patient goals for therapy: decreased pain, increased strength, independence with ADLs/IADLs, return to sport/leisure activities, increased motion, improved balance and decreased edema  Patient goal: Patient would like to be able to hike 5 miles.  Pain  Current pain rating: 3  At best pain rating: 3  At worst pain ratin  Quality: pressure  Alleviating factors: NSAIDs.  Aggravating factors: standing, walking and stair climbing      Diagnostic Tests  Abnormal x-ray: suggests lateral talar/distal fibular pseudoarthrosis.  Treatments  Current treatment: physical therapy    Objective     Observations     Additional Observation Details  R lateral malleolar swelling; no erythema, or ecchymosis.    +2 DP/PT pulses.    Tenderness     Right Ankle/Foot   Tenderness in the anterior talofibular ligament. No tenderness in the fifth metatarsal base, lateral malleolus, medial malleolus and navicular.      Neurological Testing     Sensation     Ankle/Foot   Left Ankle/Foot   Intact: light touch    Right Ankle/Foot   Intact: light touch     Passive Range of Motion   Left Hip   Flexion: 90 degrees   Abduction: 30 degrees   External rotation (90/90): 30 degrees     Right Hip   Normal passive range of motion    Strength/Myotome Testing     Left Hip   Planes of Motion   Flexion: 3-  Extension: 3-  Abduction: 3-  Adduction: 3-    Functional Assessment        Single Leg Stance   Left single leg stance time: unsteady.  Right single leg stance time: unsteady.    Posterior weight shift: moderate    Depth of femur height: above 90 degrees      Precautions: WBAT        Manuals 12/26 12/29 1/3 1/5 1/9 1/12 1/16  1/19       PROM AZ AZ AZ AZ AZ AZ AZ  AZ                                                                               Neuro Re-Ed                       TA isometrics 3x10 5s 3x10 5s 3x10 5s 3x10 5s 3x10 5s 3x10 5s 3x10 5s  30x5s       Marches     3x10 3x10 2x20 3x20 3x20 red  3x10 90/90                                                                                                                               Ther Ex                       Bike     5' 10' 10' 10' 10'  10'       Heel slides 3x10 30x 30x 30x 30x 30x D/c         Sup hip abd 3x10 30x 30x 30x 30x 30x D/c         Glute sets 3x10 5s 3x10 5s 3x10 5s 3x10 5s 3x10 5s  3x10 5s D/c         Quad sets 3x10 5s 3x10 5s 3x10 5s 3x10 5s 3x10 5s 3x10 5s D/c          Hip add 2x10 5s 3x10 5s 3x10 5s 3x10 5s 3x10 5s 3x10 5s 20x5s  30x5s       Hip abd     3x10 5s green 3x10 5s blue 3x10 5s blue 3x12 5s blue 3x15 5s blue  30x5s       Bridges   2x10 3x10 3x10 5s 2x10 ball 2x10 ball 20x ball  20x ball 20x blue       Clams           2x10 3x10  3x12 5s       SLRs           3x5 3x6  3x10       LAQ 3x10 3x12 3x15 3x10 2# 5s 3x10 2# 5s 3x10 3# 5s 3x10 3# 5s  3x10 4# 5s       Weight shifts X X X D/c               Heel raises 3x10 3x10 3x10 3x12 3x15 3x15 3x15  3x15       SL balance   " 5x to failure 10x to failure 10x to failure 10x to failure airex 15x to failure airex 15x to failure airex         Standing hamstring curls       3x10  3x12 3x12 3x15  3x15       Standing hip abd       10x ea 10x ea 12x ea 15x ea  15x ea       Standing hip ext       10x ea 10x ea 12x ea 15x ea  15x ea       Squats       2x10 to high box c/ airex 3x10 to high box c/ airex 3x10 to high box c/ airex 3x10 to high box  3x10 to high box       Standing hip flexor str             5x10s         Hip flexor str EOT             5x10s         Prone quad str             3x30s         Ther Activity                                                                       Gait Training   S/ AD S/ AD                 Stair negotiation   3x6 4\" up L/ down L 3x10 6\" up L/ down L 3x10 6\" up L/ down L 3x10 6\" up L/ down L 3x10 8\" up L/ down L 3x10 8\" up L/ down L  3x10 8\" up L/ down L                               Modalities                        10' 10' 10' dec 10' 10' 10'  10'       HPL R ankle               [place]            Nazanin Hyde, PT  1/19/2024,10:35 AM      "

## 2024-01-22 ENCOUNTER — APPOINTMENT (OUTPATIENT)
Dept: PHYSICAL THERAPY | Facility: MEDICAL CENTER | Age: 54
End: 2024-01-22
Payer: COMMERCIAL

## 2024-01-23 ENCOUNTER — OFFICE VISIT (OUTPATIENT)
Dept: PHYSICAL THERAPY | Facility: MEDICAL CENTER | Age: 54
End: 2024-01-23
Payer: COMMERCIAL

## 2024-01-23 DIAGNOSIS — M25.571 CHRONIC PAIN OF RIGHT ANKLE: Primary | ICD-10-CM

## 2024-01-23 DIAGNOSIS — M16.12 PRIMARY OSTEOARTHRITIS OF LEFT HIP: ICD-10-CM

## 2024-01-23 DIAGNOSIS — Z96.642 STATUS POST TOTAL REPLACEMENT OF LEFT HIP: ICD-10-CM

## 2024-01-23 DIAGNOSIS — G89.29 CHRONIC PAIN OF RIGHT ANKLE: Primary | ICD-10-CM

## 2024-01-23 PROCEDURE — 97110 THERAPEUTIC EXERCISES: CPT

## 2024-01-23 PROCEDURE — 97140 MANUAL THERAPY 1/> REGIONS: CPT

## 2024-01-23 NOTE — PROGRESS NOTES
Daily Note     Today's date: 2024  Patient name: Emmanuel Bey  : 1970  MRN: [id number]  Referring provider: Hannah Wong PA-C  Dx:   Encounter Diagnosis     ICD-10-CM    1. Chronic pain of right ankle  M25.571     G89.29       2. Status post total replacement of left hip  Z96.642       3. Primary osteoarthritis of left hip  M16.12                      Subjective: Pt reports that his ankle was quite sore for a few days after LV.   Pt states that he RTW for a full day which is 13+ hours yesterday and stood all day with minimal breaks.  Pt notes bilat LE fatigue with increased standing time at work.      Objective: See treatment diary below      Assessment: Tolerated treatment well. Patient demonstrated fatigue post treatment, exhibited good technique with therapeutic exercises, and would benefit from continued PT  Pt is responding well to current tx plan.        Plan: Continue per plan of care.      Precautions: WBAT      Manuals 12/26 12/29 1/3 1/5 1/9 1/12 1/16 1/23     PROM AZ AZ AZ AZ AZ AZ AZ KO                                            Neuro Re-Ed             TA isometrics 3x10 5s 3x10 5s 3x10 5s 3x10 5s 3x10 5s 3x10 5s 3x10 5s 3x10  5s     Marches   3x10 3x10 2x20 3x20 3x20 red 3x20  Red                                                                      Ther Ex             Bike   5' 10' 10' 10' 10' 10'     Heel slides 3x10 30x 30x 30x 30x 30x D/c      Sup hip abd 3x10 30x 30x 30x 30x 30x D/c      Glute sets 3x10 5s 3x10 5s 3x10 5s 3x10 5s 3x10 5s  3x10 5s D/c      Quad sets 3x10 5s 3x10 5s 3x10 5s 3x10 5s 3x10 5s 3x10 5s D/c       Hip add 2x10 5s 3x10 5s 3x10 5s 3x10 5s 3x10 5s 3x10 5s 20x5s 30x5s     Hip abd   3x10 5s green 3x10 5s blue 3x10 5s blue 3x12 5s blue 3x15 5s blue 3x15  5s  Blue  and    3x10  Blue  W/br     Bridges  2x10 3x10 3x10 5s 2x10 ball 2x10 ball 20x ball 30x  ball     Clams      2x10 3x10 3x10     SLRs      3x5 3x6 3x6     LAQ 3x10 3x12 3x15 3x10 2# 5s 3x10 2# 5s 3x10  "3# 5s 3x10 3# 5s 3x10  3#  5s     Weight shifts X X X D/c         Heel raises 3x10 3x10 3x10 3x12 3x15 3x15 3x15 3x15     SL balance  5x to failure 10x to failure 10x to failure 10x to failure airex 15x to failure airex 15x to failure airex 15x to  Failure  airex     Standing hamstring curls    3x10  3x12 3x12 3x15 3x15     Standing hip abd    10x ea 10x ea 12x ea 15x ea 15xea     Standing hip ext    10x ea 10x ea 12x ea 15x ea 15xea     Squats    2x10 to high box c/ airex 3x10 to high box c/ airex 3x10 to high box c/ airex 3x10 to high box 3x10  To  High box     Standing hip flexor str       5x10s 5x10s     Hip flexor str EOT       5x10s 5x10s     Prone quad str       3x30s 3x30s     Ther Activity                                       Gait Training  S/ AD S/ AD          Stair negotiation  3x6 4\" up L/ down L 3x10 6\" up L/ down L 3x10 6\" up L/ down L 3x10 6\" up L/ down L 3x10 8\" up L/ down L 3x10 8\" up L/ down L 3x10  8\"up  L/  Down  L                  Modalities              10' 10' 10' dec 10' 10' 10' 10'                                   "

## 2024-01-26 ENCOUNTER — OFFICE VISIT (OUTPATIENT)
Dept: PHYSICAL THERAPY | Facility: MEDICAL CENTER | Age: 54
End: 2024-01-26
Payer: COMMERCIAL

## 2024-01-26 DIAGNOSIS — M16.12 PRIMARY OSTEOARTHRITIS OF LEFT HIP: ICD-10-CM

## 2024-01-26 DIAGNOSIS — M25.571 CHRONIC PAIN OF RIGHT ANKLE: Primary | ICD-10-CM

## 2024-01-26 DIAGNOSIS — Z96.642 STATUS POST TOTAL REPLACEMENT OF LEFT HIP: ICD-10-CM

## 2024-01-26 DIAGNOSIS — G89.29 CHRONIC PAIN OF RIGHT ANKLE: Primary | ICD-10-CM

## 2024-01-26 PROCEDURE — 97140 MANUAL THERAPY 1/> REGIONS: CPT | Performed by: PHYSICAL THERAPIST

## 2024-01-26 PROCEDURE — 97110 THERAPEUTIC EXERCISES: CPT | Performed by: PHYSICAL THERAPIST

## 2024-01-26 NOTE — PROGRESS NOTES
Daily Note     Today's date: 2024  Patient name: Emmanuel Bey  : 1970  MRN: [id number]  Referring provider: Hannah Wong PA-C  Dx:   Encounter Diagnosis     ICD-10-CM    1. Chronic pain of right ankle  M25.571     G89.29       2. Status post total replacement of left hip  Z96.642       3. Primary osteoarthritis of left hip  M16.12                      Subjective: Patient states that his hip feels great, but his right ankle is very bothersome.       Objective: See treatment diary below.      Assessment:  Patient presents c/ a pocket of swelling at lateral malleolar region.  He notes pain c/ DF/PF ROM.  TC mobs performed to enhance ROM.  Pt may benefit from future R ankle CSI.  The left hip feels great.  Tolerated treatment well. Patient would benefit from continued PT.      Plan: Continue per plan of care.      Precautions: WBAT      Manuals 12/26 12/29 1/3 1/5 1/9 1/12 1/16 1/23 1/26    PROM AZ AZ AZ AZ AZ AZ AZ KO AZ    R post TC mobs         [place]                              Neuro Re-Ed             TA isometrics 3x10 5s 3x10 5s 3x10 5s 3x10 5s 3x10 5s 3x10 5s 3x10 5s 3x10  5s 3x10 5s    Marches   3x10 3x10 2x20 3x20 3x20 red 3x20  Red 3x20 UE up                                                                     Ther Ex             Bike   5' 10' 10' 10' 10' 10' 10'    Heel slides 3x10 30x 30x 30x 30x 30x D/c      Sup hip abd 3x10 30x 30x 30x 30x 30x D/c      Glute sets 3x10 5s 3x10 5s 3x10 5s 3x10 5s 3x10 5s  3x10 5s D/c      Quad sets 3x10 5s 3x10 5s 3x10 5s 3x10 5s 3x10 5s 3x10 5s D/c       Hip add 2x10 5s 3x10 5s 3x10 5s 3x10 5s 3x10 5s 3x10 5s 20x5s 30x5s 30x5s    Hip abd   3x10 5s green 3x10 5s blue 3x10 5s blue 3x12 5s blue 3x15 5s blue 3x15  5s  Blue  and    3x10  Blue  W/br 30x5s blue    Bridges  2x10 3x10 3x10 5s 2x10 ball 2x10 ball 20x ball 30x  ball 20x ball 20x blue     Clams      2x10 3x10 3x10 3x10    SLRs      3x5 3x6 3x6 3x10    LAQ 3x10 3x12 3x15 3x10 2# 5s 3x10 2# 5s 3x10 3# 5s  "3x10 3# 5s 3x10  3#  5s 3x10 4# 5s    Weight shifts X X X D/c         Heel raises 3x10 3x10 3x10 3x12 3x15 3x15 3x15 3x15 np    SL balance  5x to failure 10x to failure 10x to failure 10x to failure airex 15x to failure airex 15x to failure airex 15x to  Failure  airex np    Standing hamstring curls    3x10  3x12 3x12 3x15 3x15 3x15    Standing hip abd    10x ea 10x ea 12x ea 15x ea 15xea 15x ea    Standing hip ext    10x ea 10x ea 12x ea 15x ea 15xea 15x ea    Squats    2x10 to high box c/ airex 3x10 to high box c/ airex 3x10 to high box c/ airex 3x10 to high box 3x10  To  High box 2x10 to chair c/ airex    Standing hip flexor str       5x10s 5x10s 5x10s    Hip flexor str EOT       5x10s 5x10s 5x10s    Prone quad str       3x30s 3x30s 3x30s    Ther Activity                                       Gait Training  S/ AD S/ AD          Stair negotiation  3x6 4\" up L/ down L 3x10 6\" up L/ down L 3x10 6\" up L/ down L 3x10 6\" up L/ down L 3x10 8\" up L/ down L 3x10 8\" up L/ down L 3x10  8\"up  L/  Down  L D/c                 Modalities              10' 10' 10' dec 10' 10' 10' 10' 10'                                    "

## 2024-01-30 ENCOUNTER — OFFICE VISIT (OUTPATIENT)
Dept: PHYSICAL THERAPY | Facility: MEDICAL CENTER | Age: 54
End: 2024-01-30
Payer: COMMERCIAL

## 2024-01-30 DIAGNOSIS — Z96.642 STATUS POST TOTAL REPLACEMENT OF LEFT HIP: ICD-10-CM

## 2024-01-30 DIAGNOSIS — G89.29 CHRONIC PAIN OF RIGHT ANKLE: Primary | ICD-10-CM

## 2024-01-30 DIAGNOSIS — M25.571 CHRONIC PAIN OF RIGHT ANKLE: Primary | ICD-10-CM

## 2024-01-30 DIAGNOSIS — M16.12 PRIMARY OSTEOARTHRITIS OF LEFT HIP: ICD-10-CM

## 2024-01-30 PROCEDURE — 97140 MANUAL THERAPY 1/> REGIONS: CPT

## 2024-01-30 PROCEDURE — 97110 THERAPEUTIC EXERCISES: CPT

## 2024-01-30 NOTE — PROGRESS NOTES
"Daily Note     Today's date: 2024  Patient name: Emmanuel Bey  : 1970  MRN: [id number]  Referring provider: Hannah Wong PA-C  Dx:   Encounter Diagnosis     ICD-10-CM    1. Chronic pain of right ankle  M25.571     G89.29       2. Status post total replacement of left hip  Z96.642       3. Primary osteoarthritis of left hip  M16.12                      Subjective: Pt reports that his hip is feeling good and got into the car \"the normal way\" the other day without even thinking about it.  Pt states however, that his ankle continues to be painful and swollen.   Pt states that he just got new shoes over the weekend and has been wearing them but they still feel a little stiff.         Objective: See treatment diary below      Assessment: Tolerated treatment well. Patient demonstrated fatigue post treatment, exhibited good technique with therapeutic exercises, and would benefit from continued PT  Pt is responding well to manual therapy to R ankle and is making steady progress towards his goals.       Plan: Continue per plan of care.      Precautions: WBAT      Manuals 12/26 12/29 1/3 1/5 1/9 1/12 1/16 1/23 1/26 1/30   PROM AZ AZ AZ AZ AZ AZ AZ KO AZ KO   R post TC mobs         AZ PROM  KO                             Neuro Re-Ed             TA isometrics 3x10 5s 3x10 5s 3x10 5s 3x10 5s 3x10 5s 3x10 5s 3x10 5s 3x10  5s 3x10 5s 3x10  5s   Marches   3x10 3x10 2x20 3x20 3x20 red 3x20  Red 3x20 UE up 3x20  UE  up                                                                    Ther Ex             Bike   5' 10' 10' 10' 10' 10' 10' 10'   Heel slides 3x10 30x 30x 30x 30x 30x D/c      Sup hip abd 3x10 30x 30x 30x 30x 30x D/c      Glute sets 3x10 5s 3x10 5s 3x10 5s 3x10 5s 3x10 5s  3x10 5s D/c      Quad sets 3x10 5s 3x10 5s 3x10 5s 3x10 5s 3x10 5s 3x10 5s D/c       Hip add 2x10 5s 3x10 5s 3x10 5s 3x10 5s 3x10 5s 3x10 5s 20x5s 30x5s 30x5s 30x5s   Hip abd   3x10 5s green 3x10 5s blue 3x10 5s blue 3x12 5s blue 3x15 " "5s blue 3x15  5s  Blue  and    3x10  Blue  W/br 30x5s blue 30x5s  blue   Bridges  2x10 3x10 3x10 5s 2x10 ball 2x10 ball 20x ball 30x  ball 20x ball 20x blue  2x15  Ball  2x15  blue   Clams      2x10 3x10 3x10 3x10 3X10   SLRs      3x5 3x6 3x6 3x10 3X10   LAQ 3x10 3x12 3x15 3x10 2# 5s 3x10 2# 5s 3x10 3# 5s 3x10 3# 5s 3x10  3#  5s 3x10 4# 5s 3X10  4# 5s   Weight shifts X X X D/c         Heel raises 3x10 3x10 3x10 3x12 3x15 3x15 3x15 3x15 np np   SL balance  5x to failure 10x to failure 10x to failure 10x to failure airex 15x to failure airex 15x to failure airex 15x to  Failure  airex np np   Standing hamstring curls    3x10  3x12 3x12 3x15 3x15 3x15 3x15   Standing hip abd    10x ea 10x ea 12x ea 15x ea 15xea 15x ea 15x ea   Standing hip ext    10x ea 10x ea 12x ea 15x ea 15xea 15x ea 15x ea   Squats    2x10 to high box c/ airex 3x10 to high box c/ airex 3x10 to high box c/ airex 3x10 to high box 3x10  To  High box 2x10 to chair c/ airex 2x10 to  Chair w/  AE   Standing hip flexor str       5x10s 5x10s 5x10s 5x10s   Hip flexor str EOT       5x10s 5x10s 5x10s 5x10s   Prone quad str       3x30s 3x30s 3x30s 3x30s   Ther Activity                                       Gait Training  S/ AD S/ AD          Stair negotiation  3x6 4\" up L/ down L 3x10 6\" up L/ down L 3x10 6\" up L/ down L 3x10 6\" up L/ down L 3x10 8\" up L/ down L 3x10 8\" up L/ down L 3x10  8\"up  L/  Down  L D/c                 Modalities              10' 10' 10' dec 10' 10' 10' 10' 10' 10'                                     "

## 2024-02-02 ENCOUNTER — OFFICE VISIT (OUTPATIENT)
Dept: PHYSICAL THERAPY | Facility: MEDICAL CENTER | Age: 54
End: 2024-02-02
Payer: COMMERCIAL

## 2024-02-02 ENCOUNTER — OFFICE VISIT (OUTPATIENT)
Dept: OBGYN CLINIC | Facility: CLINIC | Age: 54
End: 2024-02-02

## 2024-02-02 VITALS
DIASTOLIC BLOOD PRESSURE: 84 MMHG | WEIGHT: 283 LBS | HEART RATE: 84 BPM | HEIGHT: 70 IN | BODY MASS INDEX: 40.52 KG/M2 | SYSTOLIC BLOOD PRESSURE: 141 MMHG

## 2024-02-02 DIAGNOSIS — Z47.1 AFTERCARE FOLLOWING LEFT HIP JOINT REPLACEMENT SURGERY: Primary | ICD-10-CM

## 2024-02-02 DIAGNOSIS — Z96.642 AFTERCARE FOLLOWING LEFT HIP JOINT REPLACEMENT SURGERY: Primary | ICD-10-CM

## 2024-02-02 DIAGNOSIS — S93.401A SPRAIN OF UNSPECIFIED LIGAMENT OF RIGHT ANKLE, INITIAL ENCOUNTER: ICD-10-CM

## 2024-02-02 DIAGNOSIS — Z96.642 STATUS POST TOTAL REPLACEMENT OF LEFT HIP: Primary | ICD-10-CM

## 2024-02-02 DIAGNOSIS — M16.12 PRIMARY OSTEOARTHRITIS OF LEFT HIP: ICD-10-CM

## 2024-02-02 DIAGNOSIS — G89.29 CHRONIC PAIN OF RIGHT ANKLE: ICD-10-CM

## 2024-02-02 DIAGNOSIS — M25.571 CHRONIC PAIN OF RIGHT ANKLE: ICD-10-CM

## 2024-02-02 PROCEDURE — 97140 MANUAL THERAPY 1/> REGIONS: CPT | Performed by: PHYSICAL THERAPIST

## 2024-02-02 PROCEDURE — 97110 THERAPEUTIC EXERCISES: CPT | Performed by: PHYSICAL THERAPIST

## 2024-02-02 PROCEDURE — 99024 POSTOP FOLLOW-UP VISIT: CPT | Performed by: STUDENT IN AN ORGANIZED HEALTH CARE EDUCATION/TRAINING PROGRAM

## 2024-02-02 PROCEDURE — 97164 PT RE-EVAL EST PLAN CARE: CPT | Performed by: PHYSICAL THERAPIST

## 2024-02-02 NOTE — PROGRESS NOTES
Subjective: 53 y.o. male presents to the office 6 weeks s/p left total hip arthroplasty performed on 12/21/2023. He has been compliant with his hip precautions. Essentially pain-free and he feels that he is doing very well.  Progressing very well, ambulates independently without assistive device. Incision well-healed. Denies fevers or chills. He has been attending PT and is making excellent progress. He is complaining of right ankle pain. He is back at work.    Physical Exam:  Left hip-  Incision: well-healed.  ROM: normal post operative without pain  5/5 IP/Q/HS/TA/GS, 2+ DP/PT, SILT DP/SP/S/S/TN    Right ankle-   Inspection: skin intact, +swelling laterally  +TTP over ATFL and CFL  Ligaments intact      Assessment/Plan:  6 weeks s/p left total hip arthroplasty performed on 12/21/2023, Right ankle sprain.      - continue multi-modal pain control   - Weight bearing status: as tolerated  - DVT ppx: complete  - Incision care: avoid submerging and scrubbing  - Continue PT/OT exercises, continue for the left hip and right ankle.  - Will give him an ankle support brace and referral to Dr. Alexander.   - F/U in 6 weeks for the left hip      Scribe Attestation      I,:  Hannah Wong PA-C am acting as a scribe while in the presence of the attending physician.:       I,:  Kenneth Grayson, DO personally performed the services described in this documentation    as scribed in my presence.:              2021

## 2024-02-02 NOTE — PROGRESS NOTES
Re-Eval    Today's date: 2024  Patient name: Emmanuel Bey  : 1970  MRN: [id number]  Referring provider: Hannah Wong PA-C  Dx:   Encounter Diagnosis     ICD-10-CM    1. Status post total replacement of left hip  Z96.642       2. Chronic pain of right ankle  M25.571     G89.29       3. Primary osteoarthritis of left hip  M16.12                        Assessment  Assessment details: Emmanuel Bey is a 53 y.o. male presents s/p L CHRISTOPHER c/ posterolateral approach. DOS 23.    Emmanuel has been compliant attending physical therapy since sx and will continue to benefit from skilled PT to address the impairments below and to return to PLOF.        Impairments: abnormal gait, abnormal muscle firing, abnormal or restricted ROM, abnormal movement, activity intolerance, impaired physical strength, lacks appropriate home exercise program, pain with function and weight-bearing intolerance  Understanding of Dx/Px/POC: good   Prognosis: good    Goals  Impairment Goals  - Pt I with initial HEP in 1-2 visits- achieved  - Improve ROM equal to contralateral side in 6 weeks- achieved  - Increase strength to at least 3+/5 in all affected areas in 4-6 weeks- in progress    Functional Goals  - Increase Functional Status Measure to: goal status in 6-8 weeks- in progress  - Patient will be independent with comprehensive HEP in 6-8 weeks- achieved  - Ambulation is improved to prior level of function in 6-8 weeks- in progress  - Stair climbing is improved to prior level of function in 6-8 weeks- in progress  - Squatting is improved to prior level of function in 6-8 weeks- in progress       Plan  Patient would benefit from: PT eval  Planned modality interventions: thermotherapy: hydrocollator packs and cryotherapy  Planned therapy interventions: manual therapy, neuromuscular re-education, patient education, strengthening, stretching, therapeutic exercise, therapeutic activities, home exercise program, graded exercise,  graded activity, flexibility, body mechanics training, balance/weight bearing training and abdominal trunk stabilization  Frequency: 1-2x/ week  Duration in weeks: 8  Subjective Evaluation    History of Present Illness  Mechanism of injury: Patient is s/p L CHRISTOPHER (posterolateral approach) on 23 performed by Dr. Grayson.  Patient went home the same day after sx.  He states that he experienced severe thigh and leg pain on Thursday and Friday, which has almost resolved.  Patient denies calf pain.  He is taking oxycodone prn.  He did not take pain medication today.  Patient is ambulating c/ front wheeled walker.  He would like to transition to axillary crutches, or an SPC for comfort asap.  He is aware of total hip precautions.  He has a hx of L ACL reconstruction.  Patient is a  at [employer].  His goal is to return to being able to hike 5 miles.  Patient Goals  Patient goals for therapy: increased strength, independence with ADLs/IADLs, return to sport/leisure activities, improved balance, increased motion and decreased pain    Pain  Current pain ratin  At best pain ratin  At worst pain ratin    Treatments  Current treatment: physical therapy    Objective     Observations     Additional Observation Details  Surgical wound appears clean and dry c/ post op dressing in place.  No S/S of infection.      +2 DP/PT pulses.    Neurological Testing     Sensation     Hip   Left Hip   Intact: light touch    Right Hip   Intact: light touch    Additional Neurological Details  Reflexes NT.    Active Range of Motion   Left Hip   Flexion: 90 degrees   Abduction: 30 degrees     Passive Range of Motion   Left Hip   Flexion: 90 degrees   Abduction: 30 degrees     Strength/Myotome Testing     Left Hip   Planes of Motion   Flexion: 4/5  Abduction: 3/5  Adduction: 3-/5    Tests     Additional Tests Details  No pertinent testing d/t post op pd.       Precautions: WBAT      Manuals 2/            PROM AZ             R post TC mobs                                       Neuro Re-Ed             TA isometrics 3x10 5s            Marches 3x20 UE up                                                                             Ther Ex             Bike 10'            Hip add 30x5s            Hip abd 30x5s black            Bridges 20x ball 20x black            Clams 3x12 5s            SL hip abd 2x10            SLRs             Prone hip ext 2x10                         Heel raises             SL balance             Standing hamstring curls                          Squats                          Standing hip flexor str 3x30s            Hip flexor str EOT 3x30s            Prone quad str 3x30s                         Ther Activity                                       Gait Training                                       Modalities              10'

## 2024-02-06 ENCOUNTER — OFFICE VISIT (OUTPATIENT)
Dept: PHYSICAL THERAPY | Facility: MEDICAL CENTER | Age: 54
End: 2024-02-06
Payer: COMMERCIAL

## 2024-02-06 DIAGNOSIS — Z96.642 STATUS POST TOTAL REPLACEMENT OF LEFT HIP: Primary | ICD-10-CM

## 2024-02-06 DIAGNOSIS — M25.571 CHRONIC PAIN OF RIGHT ANKLE: ICD-10-CM

## 2024-02-06 DIAGNOSIS — G89.29 CHRONIC PAIN OF RIGHT ANKLE: ICD-10-CM

## 2024-02-06 DIAGNOSIS — M16.12 PRIMARY OSTEOARTHRITIS OF LEFT HIP: ICD-10-CM

## 2024-02-06 PROCEDURE — 97110 THERAPEUTIC EXERCISES: CPT

## 2024-02-06 NOTE — PROGRESS NOTES
Daily Note     Today's date: 2024  Patient name: Emmanuel Bey  : 1970  MRN: [id number]  Referring provider: Hannah Wong PA-C  Dx:   Encounter Diagnosis     ICD-10-CM    1. Status post total replacement of left hip  Z96.642       2. Chronic pain of right ankle  M25.571     G89.29       3. Primary osteoarthritis of left hip  M16.12           Start Time: 1500  Stop Time: 1530  Total time in clinic (min): 30 minutes    Subjective: Pt reports L hip is doing great, he states his physician informed him he no longer needed to follow hip precautions and things are looking good. His right ankle however is becoming more of an issue for him. He has an apportionment with a podiatrist coming up soon. He did purchase a better pair of sneakers with side support that he has been wearing for the last week which seem to be helping. States he notices a slight decrease in swelling to ankle.      Objective: See treatment diary below      Assessment: Tolerated treatment well with good recall and carryover to program. Patient appears to making progress towards L hip goals as demonstrated by ability to perform all TE's without symptoms and demonstrates adequate technique. Patient responds well to manual with relief post treatment. Patient benefited from min cueing to breath on exertion to avoid the valsalva maneuver with good response. Patient would benefit from continued PT       Plan: Continue per plan of care.      Precautions: WBAT      Manuals            PROM AZ JM           R post TC mobs                                       Neuro Re-Ed             TA isometrics 3x10 5s 3x10 5s           Marches 3x20 UE up 3x20 UE up                                                                            Ther Ex             Bike 10' 10'           Hip add 30x5s 30x5s           Hip abd 30x5s black 30x5s black           Bridges 20x ball 20x black 20x ball 20x black           Clams 3x12 5s 3x12 5s           SL hip abd 2x10  2x10           SLRs  2x10           Prone hip ext 2x10 2x10                        Heel raises  NT  derferred           SL balance             Standing hamstring curls                          Squats             LAQ  4#  3x15           Standing hip flexor str 3x30s 3x30s           Hip flexor str EOT 3x30s 3x30s           Prone quad str 3x30s 3x30s                        Ther Activity                                       Gait Training                                       Modalities              10' 5'

## 2024-02-09 ENCOUNTER — OFFICE VISIT (OUTPATIENT)
Dept: PHYSICAL THERAPY | Facility: MEDICAL CENTER | Age: 54
End: 2024-02-09
Payer: COMMERCIAL

## 2024-02-09 DIAGNOSIS — Z96.642 STATUS POST TOTAL REPLACEMENT OF LEFT HIP: Primary | ICD-10-CM

## 2024-02-09 DIAGNOSIS — M16.12 PRIMARY OSTEOARTHRITIS OF LEFT HIP: ICD-10-CM

## 2024-02-09 DIAGNOSIS — G89.29 CHRONIC PAIN OF RIGHT ANKLE: ICD-10-CM

## 2024-02-09 DIAGNOSIS — M25.571 CHRONIC PAIN OF RIGHT ANKLE: ICD-10-CM

## 2024-02-09 PROCEDURE — 97140 MANUAL THERAPY 1/> REGIONS: CPT | Performed by: PHYSICAL THERAPIST

## 2024-02-09 PROCEDURE — 97110 THERAPEUTIC EXERCISES: CPT | Performed by: PHYSICAL THERAPIST

## 2024-02-09 NOTE — PROGRESS NOTES
Daily Note     Today's date: 2024  Patient name: Emmanuel Bey  : 1970  MRN: [id number]  Referring provider: Hannah Wong PA-C  Dx:   Encounter Diagnosis     ICD-10-CM    1. Status post total replacement of left hip  Z96.642       2. Chronic pain of right ankle  M25.571     G89.29       3. Primary osteoarthritis of left hip  M16.12                      Subjective: Patient states that he is doing well.      Objective: See treatment diary below.      Assessment:  Patient demonstrates good tolerance to progressions.  Tolerated treatment well. Patient would benefit from continued PT      Plan: Continue per plan of care.      Precautions: WBAT      Manuals           PROM AZ JM AZ          R post TC mobs   AZ                                    Neuro Re-Ed             TA isometrics 3x10 5s 3x10 5s 3x10 5s          Marches 3x20 UE up 3x20 UE up 3x20 UE up                                                                           Ther Ex             Bike 10' 10' 10'          Hip add 30x5s 30x5s 30x5s          Hip abd 30x5s black 30x5s black 30x5s black          Bridges 20x ball 20x black 20x ball 20x black 30x ball 30x black          Clams 3x12 5s 3x12 5s 3x15 5s          SL hip abd 2x10 2x10 3x10          SLRs  2x10 3x10          Prone hip ext 2x10 2x10 3x10                       Heel raises  NT  derferred           SL balance             Standing hamstring curls                          Squats             LAQ  4#  3x15 3x10 5#          Standing hip flexor str 3x30s 3x30s 3x30s          Hip flexor str EOT 3x30s 3x30s 3x30s          Prone quad str 3x30s 3x30s 3x30s                       Ther Activity                                       Gait Training                                       Modalities              10' 5'

## 2024-02-13 ENCOUNTER — APPOINTMENT (OUTPATIENT)
Dept: PHYSICAL THERAPY | Facility: MEDICAL CENTER | Age: 54
End: 2024-02-13
Payer: COMMERCIAL

## 2024-02-16 ENCOUNTER — OFFICE VISIT (OUTPATIENT)
Dept: PODIATRY | Facility: CLINIC | Age: 54
End: 2024-02-16
Payer: COMMERCIAL

## 2024-02-16 ENCOUNTER — OFFICE VISIT (OUTPATIENT)
Dept: PHYSICAL THERAPY | Facility: MEDICAL CENTER | Age: 54
End: 2024-02-16
Payer: COMMERCIAL

## 2024-02-16 VITALS
HEIGHT: 70 IN | WEIGHT: 283 LBS | SYSTOLIC BLOOD PRESSURE: 118 MMHG | BODY MASS INDEX: 40.52 KG/M2 | DIASTOLIC BLOOD PRESSURE: 80 MMHG

## 2024-02-16 DIAGNOSIS — G89.29 CHRONIC PAIN OF RIGHT ANKLE: Primary | ICD-10-CM

## 2024-02-16 DIAGNOSIS — M76.71 PERONEAL TENDINITIS OF RIGHT LOWER EXTREMITY: ICD-10-CM

## 2024-02-16 DIAGNOSIS — Z96.642 STATUS POST TOTAL REPLACEMENT OF LEFT HIP: Primary | ICD-10-CM

## 2024-02-16 DIAGNOSIS — G89.29 CHRONIC PAIN IN RIGHT FOOT: ICD-10-CM

## 2024-02-16 DIAGNOSIS — M25.571 CHRONIC PAIN OF RIGHT ANKLE: ICD-10-CM

## 2024-02-16 DIAGNOSIS — M25.571 CHRONIC PAIN OF RIGHT ANKLE: Primary | ICD-10-CM

## 2024-02-16 DIAGNOSIS — S93.401A SPRAIN OF UNSPECIFIED LIGAMENT OF RIGHT ANKLE, INITIAL ENCOUNTER: ICD-10-CM

## 2024-02-16 DIAGNOSIS — M79.671 CHRONIC PAIN IN RIGHT FOOT: ICD-10-CM

## 2024-02-16 DIAGNOSIS — M16.12 PRIMARY OSTEOARTHRITIS OF LEFT HIP: ICD-10-CM

## 2024-02-16 DIAGNOSIS — G89.29 CHRONIC PAIN OF RIGHT ANKLE: ICD-10-CM

## 2024-02-16 PROCEDURE — 99203 OFFICE O/P NEW LOW 30 MIN: CPT | Performed by: PODIATRIST

## 2024-02-16 PROCEDURE — 97140 MANUAL THERAPY 1/> REGIONS: CPT | Performed by: PHYSICAL THERAPIST

## 2024-02-16 PROCEDURE — 97110 THERAPEUTIC EXERCISES: CPT | Performed by: PHYSICAL THERAPIST

## 2024-02-16 RX ORDER — MELOXICAM 15 MG/1
15 TABLET ORAL DAILY
Qty: 30 TABLET | Refills: 1 | Status: SHIPPED | OUTPATIENT
Start: 2024-02-16 | End: 2024-04-16

## 2024-02-16 NOTE — PROGRESS NOTES
Daily Note     Today's date: 2024  Patient name: Emmanuel Bey  : 1970  MRN: [id number]  Referring provider: Hannah Wong PA-C  Dx:   Encounter Diagnosis     ICD-10-CM    1. Status post total replacement of left hip  Z96.642       2. Chronic pain of right ankle  M25.571     G89.29       3. Primary osteoarthritis of left hip  M16.12                      Subjective: Patient states that he had a follow up c/ the podiatrist on his ankle and it was recommended to continue c/ physical therapy.      Objective: See treatment diary below      Assessment:  Patient demonstrates good tolerance to progressions.  He noted that the exercises are more challenging to perform with the right leg than the left.  Tolerated treatment well. Patient would benefit from continued PT      Plan: Continue per plan of care.      Precautions: WBAT      Manuals          PROM AZ JM AZ AZ         R post TC mobs   AZ AZ                                   Neuro Re-Ed             TA isometrics 3x10 5s 3x10 5s 3x10 5s 3x10 5s         Marches 3x20 UE up 3x20 UE up 3x20 UE up 3x20 UE up                                                                          Ther Ex             Bike 10' 10' 10' 10'         Hip add 30x5s 30x5s 30x5s 30x5s         Hip abd 30x5s black 30x5s black 30x5s black 30x5s black         Bridges 20x ball 20x black 20x ball 20x black 30x ball 30x black 30x ball 30x black         Clams 3x12 5s 3x12 5s 3x15 5s 3x15 5s         SL hip abd 2x10 2x10 3x10 3x12         SLRs  2x10 3x10 3x12         Prone hip ext 2x10 2x10 3x10 3x12                      Heel raises  NT  derferred           SL balance             Standing hamstring curls                          Squats             LAQ  4#  3x15 3x10 5#          Standing hip flexor str 3x30s 3x30s 3x30s 3x30s         Hip flexor str EOT 3x30s 3x30s 3x30s 3x30s         Prone quad str 3x30s 3x30s 3x30s 3x30s         Piriformis str    3x30s         Ther Activity                                        Gait Training                                       Modalities              10' 5'

## 2024-02-16 NOTE — PROGRESS NOTES
Assessment/Plan:  Ortho records reviewed as patient is recently recovering from surgery.  Recommend patient continue with ankle brace if it gives him adequate support.  Rx physical therapy for 6 weeks course for his ankle in addition to his current hip therapy.  Rx right foot and ankle x-rays.  Rx meloxicam 15 mg once a day anti-inflammatory.  Instructed on range of motion stretching exercises and application of ice as needed.      No problem-specific Assessment & Plan notes found for this encounter.       Diagnoses and all orders for this visit:    Chronic pain of right ankle  -     X-ray ankle right 3+ views; Future  -     meloxicam (Mobic) 15 mg tablet; Take 1 tablet (15 mg total) by mouth daily  -     X-ray foot right 3+ views; Future  -     Ambulatory referral to Physical Therapy; Future    Sprain of unspecified ligament of right ankle, initial encounter  -     Ambulatory Referral to Podiatry    Chronic pain in right foot  -     Ambulatory referral to Physical Therapy; Future    Peroneal tendinitis of right lower extremity          Subjective:      Patient ID: Emmanuel Bey is a 53 y.o. male.    53-year-old male presents with right ankle pain for 2 years duration.  Has been wearing a brace dispensed by Dr. Grayson for his ankle who then referred him to our office for care.  Pain is localized to the lateral aspect of the right rear foot.  Does not specifically recall any injury to his ankle but he may have done something to it to 3 years ago.        The following portions of the patient's history were reviewed and updated as appropriate: allergies, current medications, past family history, past medical history, past social history, past surgical history, and problem list.    Review of Systems   Constitutional: Negative.    HENT: Negative.     Eyes: Negative.    Respiratory: Negative.     Cardiovascular: Negative.    Gastrointestinal: Negative.    Endocrine: Negative.    Genitourinary: Negative.   "  Musculoskeletal:  Positive for arthralgias.        Right lateral rear foot pain   Skin: Negative.    Allergic/Immunologic: Negative.    Neurological: Negative.    Hematological: Negative.    Psychiatric/Behavioral: Negative.           Objective:      /80 (BP Location: Left arm, Patient Position: Sitting, Cuff Size: Adult)   Ht 5' 10\" (1.778 m)   Wt 128 kg (283 lb)   BMI 40.61 kg/m²          Physical Exam  Constitutional:       Appearance: Normal appearance.   HENT:      Head: Normocephalic.      Right Ear: Tympanic membrane normal.      Left Ear: Tympanic membrane normal.      Nose: No congestion.      Mouth/Throat:      Pharynx: No oropharyngeal exudate or posterior oropharyngeal erythema.   Eyes:      Conjunctiva/sclera: Conjunctivae normal.      Pupils: Pupils are equal, round, and reactive to light.   Cardiovascular:      Rate and Rhythm: Normal rate and regular rhythm.      Pulses: Normal pulses.   Pulmonary:      Effort: Pulmonary effort is normal.   Musculoskeletal:         General: Tenderness present.      Right ankle: Tenderness present over the ATF ligament and CF ligament. Decreased range of motion.      Right Achilles Tendon: Normal.      Left ankle: Normal.      Left Achilles Tendon: Normal.      Comments: Pain localized along the peroneal tendons and at the calcaneocuboid joint..   Feet:      Right foot:      Protective Sensation: 10 sites tested.        Left foot:      Protective Sensation: 10 sites tested.     Skin:     General: Skin is warm and dry.      Capillary Refill: Capillary refill takes 2 to 3 seconds.      Coloration: Skin is not pale.      Findings: No bruising, erythema, lesion or rash.   Neurological:      Mental Status: He is alert.      Cranial Nerves: No cranial nerve deficit.      Sensory: No sensory deficit.      Motor: No weakness.      Gait: Gait normal.      Deep Tendon Reflexes: Reflexes normal.   Psychiatric:         Mood and Affect: Mood normal.         Behavior: " Behavior normal.         Judgment: Judgment normal.

## 2024-02-20 ENCOUNTER — OFFICE VISIT (OUTPATIENT)
Dept: PHYSICAL THERAPY | Facility: MEDICAL CENTER | Age: 54
End: 2024-02-20
Payer: COMMERCIAL

## 2024-02-20 DIAGNOSIS — M16.12 PRIMARY OSTEOARTHRITIS OF LEFT HIP: ICD-10-CM

## 2024-02-20 DIAGNOSIS — G89.29 CHRONIC PAIN OF RIGHT ANKLE: ICD-10-CM

## 2024-02-20 DIAGNOSIS — Z96.642 STATUS POST TOTAL REPLACEMENT OF LEFT HIP: Primary | ICD-10-CM

## 2024-02-20 DIAGNOSIS — M25.571 CHRONIC PAIN OF RIGHT ANKLE: ICD-10-CM

## 2024-02-20 PROCEDURE — 97110 THERAPEUTIC EXERCISES: CPT

## 2024-02-20 PROCEDURE — 97140 MANUAL THERAPY 1/> REGIONS: CPT

## 2024-02-20 NOTE — PROGRESS NOTES
Daily Note     Today's date: 2024  Patient name: Emmanuel Bey  : 1970  MRN: [id number]  Referring provider: Hannah Wong PA-C  Dx:   Encounter Diagnosis     ICD-10-CM    1. Status post total replacement of left hip  Z96.642       2. Chronic pain of right ankle  M25.571     G89.29       3. Primary osteoarthritis of left hip  M16.12                      Subjective: Pt reports that he went to the Podiatrist last week and was given script to get an x-ray of his ankle.  Podiatrist will reevaluate in 5 weeks the result of the imaging and the possibility of getting a cortisone injection at that time. Pt states that his L hip has improved, but is still limited, especially from his R ankle in his goal of returning to hiking. Pt took Meloxicam to help with his R ankle swelling and notes improvement.       Objective: See treatment diary below      Assessment: Tolerated treatment well. Patient demonstrated fatigue post treatment, exhibited good technique with therapeutic exercises, and would benefit from continued PT  Pt was able to progress with his ex's today due to ankle feeling better and less swelling.       Plan: Continue per plan of care.      Precautions: WBAT      Manuals  2/6 2        PROM AZ JM AZ AZ KO        R post TC mobs   AZ AZ SARAH BETH                                  Neuro Re-Ed             TA isometrics 3x10 5s 3x10 5s 3x10 5s 3x10 5s 3x10  5s        Marches 3x20 UE up 3x20 UE up 3x20 UE up 3x20 UE up 3x20  UE up                                                                         Ther Ex             Bike 10' 10' 10' 10' 10'        Hip add 30x5s 30x5s 30x5s 30x5s 30x5s        Hip abd 30x5s black 30x5s black 30x5s black 30x5s black 30x5s  black        Bridges 20x ball 20x black 20x ball 20x black 30x ball 30x black 30x ball 30x black 30x  Ball  30x  black        Clams 3x12 5s 3x12 5s 3x15 5s 3x15 5s 3x15  5s        SL hip abd 2x10 2x10 3x10 3x12 3x15        SLRs  2x10 3x10 3x12  3x15 L  X30 R        Prone hip ext 2x10 2x10 3x10 3x12 3x15                     Heel raises  NT  derferred   3x10        SL balance             Standing hamstring curls     3x10        Standing hip abduction     3x10        Squats             LAQ  4#  3x15 3x10 5#  5#  x30        Standing hip flexor str 3x30s 3x30s 3x30s 3x30s 3x30s        Hip flexor str EOT 3x30s 3x30s 3x30s 3x30s 3x30s        Prone quad str 3x30s 3x30s 3x30s 3x30s 3x30s        Piriformis str    3x30s 3x30s        Ther Activity                                       Gait Training                                       Modalities              10' 5'

## 2024-02-23 ENCOUNTER — APPOINTMENT (OUTPATIENT)
Dept: PHYSICAL THERAPY | Facility: MEDICAL CENTER | Age: 54
End: 2024-02-23
Payer: COMMERCIAL

## 2024-02-23 ENCOUNTER — TELEPHONE (OUTPATIENT)
Dept: OBGYN CLINIC | Facility: HOSPITAL | Age: 54
End: 2024-02-23

## 2024-02-23 DIAGNOSIS — Z47.1 AFTERCARE FOLLOWING LEFT HIP JOINT REPLACEMENT SURGERY: Primary | ICD-10-CM

## 2024-02-23 DIAGNOSIS — Z96.642 AFTERCARE FOLLOWING LEFT HIP JOINT REPLACEMENT SURGERY: Primary | ICD-10-CM

## 2024-02-23 NOTE — TELEPHONE ENCOUNTER
Caller: Shannan Wheeler DDS    Doctor: Kenan    Reason for call: Patient's dental office calling to see if the patient needs pre-med for dental cleaning or dental work.  Left CHRISTOPHER 12/21/23    FAX#: [phone]    Call back#: [phone]

## 2024-02-23 NOTE — LETTER
February 26, 2024     Patient: Emmanuel Bey  YOB: 1970  Date of Visit: 2/23/2024      To Whom it May Concern:    Emmanuel Bey is under my professional care. We recommend waiting 3 months post operatively before dental cleanings or procedures.  Date of surgery was 12/21/23.  We advise dental antibiotic prophylaxis for 1 year following surgery.  He does have a history of penicillin allergy,  so we recommend 600mg Clindamycin 1 hour prior to dental procedure or cleaning.      If you have any questions or concerns, please don't hesitate to call.         Sincerely,          Dr. Kenneth Grayson D.O.        CC: Shannan Wheeler DDS

## 2024-02-26 RX ORDER — CLINDAMYCIN HYDROCHLORIDE 300 MG/1
CAPSULE ORAL
Qty: 2 CAPSULE | Refills: 5 | Status: SHIPPED | OUTPATIENT
Start: 2024-02-26 | End: 2024-02-26

## 2024-02-26 NOTE — TELEPHONE ENCOUNTER
Note placed for dental work and faxed to the dentist.  I did send a script for antibiotics to the patient's pharmacy

## 2024-02-27 ENCOUNTER — OFFICE VISIT (OUTPATIENT)
Dept: PHYSICAL THERAPY | Facility: MEDICAL CENTER | Age: 54
End: 2024-02-27
Payer: COMMERCIAL

## 2024-02-27 DIAGNOSIS — M25.571 CHRONIC PAIN OF RIGHT ANKLE: ICD-10-CM

## 2024-02-27 DIAGNOSIS — G89.29 CHRONIC PAIN OF RIGHT ANKLE: ICD-10-CM

## 2024-02-27 DIAGNOSIS — Z96.642 STATUS POST TOTAL REPLACEMENT OF LEFT HIP: Primary | ICD-10-CM

## 2024-02-27 DIAGNOSIS — M16.12 PRIMARY OSTEOARTHRITIS OF LEFT HIP: ICD-10-CM

## 2024-02-27 PROCEDURE — 97110 THERAPEUTIC EXERCISES: CPT

## 2024-02-27 PROCEDURE — 97140 MANUAL THERAPY 1/> REGIONS: CPT

## 2024-02-27 NOTE — PROGRESS NOTES
Daily Note     Today's date: 2024  Patient name: Emmanuel Bey  : 1970  MRN: [id number]  Referring provider: Hannah Wong PA-C  Dx:   Encounter Diagnosis     ICD-10-CM    1. Status post total replacement of left hip  Z96.642       2. Chronic pain of right ankle  M25.571     G89.29       3. Primary osteoarthritis of left hip  M16.12                      Subjective: Pt reports that he went for a 20 min walk which included hills and his hip and ankle felt pretty good with this.       Objective: See treatment diary below      Assessment: Tolerated treatment well. Patient demonstrated fatigue post treatment, exhibited good technique with therapeutic exercises, and would benefit from continued PT  Pt is making good progress towards his goals.  Pt is able to resume ex's and his walking with improved tolerance and strength.       Plan: Continue per plan of care.      Precautions: WBAT      Manuals        PROM AZ JM KENDALL BURLESON KO       R post TC mobs   KENDALL BURLESON                                 Neuro Re-Ed             TA isometrics 3x10 5s 3x10 5s 3x10 5s 3x10 5s 3x10  5s 3x10  5s       Marches 3x20 UE up 3x20 UE up 3x20 UE up 3x20 UE up 3x20  UE up 3x20  UE up                                                                        Ther Ex             Bike 10' 10' 10' 10' 10' 10'       Hip add 30x5s 30x5s 30x5s 30x5s 30x5s 30x5s       Hip abd 30x5s black 30x5s black 30x5s black 30x5s black 30x5s  black 30x5s  black       Bridges 20x ball 20x black 20x ball 20x black 30x ball 30x black 30x ball 30x black 30x  Ball  30x  black 30x  Ball  30x  black       Clams 3x12 5s 3x12 5s 3x15 5s 3x15 5s 3x15  5s 3x15  5s       SL hip abd 2x10 2x10 3x10 3x12 3x15 3x15       SLRs  2x10 3x10 3x12 3x15 L  X30 R 3x10  L&R         Prone hip ext 2x10 2x10 3x10 3x12 3x15 3x15                    Heel raises  NT  derferred   3x10 x30       SL balance             Standing hamstring curls     3x10 3x10        Standing hip abduction     3x10 X20  ea       Squats      3x10       LAQ  4#  3x15 3x10 5#  5#  x30 5#  x30       Standing hip flexor str 3x30s 3x30s 3x30s 3x30s 3x30s 3x30s       Hip flexor str EOT 3x30s 3x30s 3x30s 3x30s 3x30s 3x30s       Prone quad str 3x30s 3x30s 3x30s 3x30s 3x30s 3x30s       Piriformis str    3x30s 3x30s 3x30s       Ther Activity                                       Gait Training                                       Modalities              10' 5'

## 2024-03-01 ENCOUNTER — OFFICE VISIT (OUTPATIENT)
Dept: PHYSICAL THERAPY | Facility: MEDICAL CENTER | Age: 54
End: 2024-03-01
Payer: COMMERCIAL

## 2024-03-01 DIAGNOSIS — M16.12 PRIMARY OSTEOARTHRITIS OF LEFT HIP: ICD-10-CM

## 2024-03-01 DIAGNOSIS — Z96.642 STATUS POST TOTAL REPLACEMENT OF LEFT HIP: Primary | ICD-10-CM

## 2024-03-01 DIAGNOSIS — G89.29 CHRONIC PAIN OF RIGHT ANKLE: ICD-10-CM

## 2024-03-01 DIAGNOSIS — M25.571 CHRONIC PAIN OF RIGHT ANKLE: ICD-10-CM

## 2024-03-01 PROCEDURE — 97140 MANUAL THERAPY 1/> REGIONS: CPT | Performed by: PHYSICAL THERAPIST

## 2024-03-01 PROCEDURE — 97110 THERAPEUTIC EXERCISES: CPT | Performed by: PHYSICAL THERAPIST

## 2024-03-01 NOTE — PROGRESS NOTES
Daily Note     Today's date: 3/1/2024  Patient name: Emmanuel Bey  : 1970  MRN: [id number]  Referring provider: Hannah Wong PA-C  Dx:   Encounter Diagnosis     ICD-10-CM    1. Status post total replacement of left hip  Z96.642       2. Chronic pain of right ankle  M25.571     G89.29       3. Primary osteoarthritis of left hip  M16.12                      Subjective:  Patient states that he is doing well.      Objective: See treatment diary below.      Assessment:  Patient demonstrates good tolerance to progressions.  Tolerated treatment well. Patient would benefit from continued PT.      Plan: Continue per plan of care.      Precautions: WBAT      Manuals 2/2 2/6 2/9 2/16 2/20 2/27 3/1      PROM AZ JM AZ AZ KO KO AZ      R post TC mobs   AZ AZ KO KO AZ                                Neuro Re-Ed             TA isometrics 3x10 5s 3x10 5s 3x10 5s 3x10 5s 3x10  5s 3x10  5s 3x10 5s      Marches 3x20 UE up 3x20 UE up 3x20 UE up 3x20 UE up 3x20  UE up 3x20  UE up 3x20 UE up                                                                       Ther Ex             Bike 10' 10' 10' 10' 10' 10' 10'      Hip add 30x5s 30x5s 30x5s 30x5s 30x5s 30x5s 30x5s      Hip abd 30x5s black 30x5s black 30x5s black 30x5s black 30x5s  black 30x5s  black 30x5s black      Bridges 20x ball 20x black 20x ball 20x black 30x ball 30x black 30x ball 30x black 30x  Ball  30x  black 30x  Ball  30x  black 30x ball 30x black      Clams 3x12 5s 3x12 5s 3x15 5s 3x15 5s 3x15  5s 3x15  5s 3x15  5s      SL hip abd 2x10 2x10 3x10 3x12 3x15 3x15 3x15      SLRs  2x10 3x10 3x12 3x15 L  X30 R 3x10  L&R   3x10       Prone hip ext 2x10 2x10 3x10 3x12 3x15 3x15 3x15                   Heel raises  NT  derferred   3x10 x30 x30      SL balance             Standing hamstring curls     3x10 3x10 3x10      Standing hip abduction     3x10 X20  ea x30      Squats      3x10 3x10      LAQ  4#  3x15 3x10 5#  5#  x30 5#  x30 3x10 7#      Standing hip flexor str  3x30s 3x30s 3x30s 3x30s 3x30s 3x30s 3x30s      Hip flexor str EOT 3x30s 3x30s 3x30s 3x30s 3x30s 3x30s 3x30s      Prone quad str 3x30s 3x30s 3x30s 3x30s 3x30s 3x30s 3x30s      Piriformis str    3x30s 3x30s 3x30s 3x30s      Ther Activity                                       Gait Training                                       Modalities              10' 5'

## 2024-03-05 ENCOUNTER — OFFICE VISIT (OUTPATIENT)
Dept: PHYSICAL THERAPY | Facility: MEDICAL CENTER | Age: 54
End: 2024-03-05
Payer: COMMERCIAL

## 2024-03-05 DIAGNOSIS — Z96.642 STATUS POST TOTAL REPLACEMENT OF LEFT HIP: Primary | ICD-10-CM

## 2024-03-05 DIAGNOSIS — M25.571 CHRONIC PAIN OF RIGHT ANKLE: ICD-10-CM

## 2024-03-05 DIAGNOSIS — G89.29 CHRONIC PAIN OF RIGHT ANKLE: ICD-10-CM

## 2024-03-05 DIAGNOSIS — M16.12 PRIMARY OSTEOARTHRITIS OF LEFT HIP: ICD-10-CM

## 2024-03-05 PROCEDURE — 97110 THERAPEUTIC EXERCISES: CPT

## 2024-03-05 PROCEDURE — 97140 MANUAL THERAPY 1/> REGIONS: CPT

## 2024-03-05 NOTE — PROGRESS NOTES
Daily Note     Today's date: 3/5/2024  Patient name: Emmanuel Bey  : 1970  MRN: [id number]  Referring provider: Hannah Wong PA-C  Dx:   Encounter Diagnosis     ICD-10-CM    1. Status post total replacement of left hip  Z96.642       2. Primary osteoarthritis of left hip  M16.12       3. Chronic pain of right ankle  M25.571     G89.29                      Subjective: Pt reports that his ankle is feeling better and has more mobility and strength now.        Objective: See treatment diary below      Assessment: Tolerated treatment well. Patient demonstrated fatigue post treatment, exhibited good technique with therapeutic exercises, and would benefit from continued PT  A definite improvement noted in pts R ankle mobility w/PROM.  Pt self progressed w/added 7# resistance with ex's as noted below, but was without provocation of sx's.  Pt will be away on vacation for two weeks and will resume PT upon return.       Plan: Continue per plan of care.      Precautions: WBAT      Manuals 2/2 2/6 2/9 2/16 2/20 2/27 3/1 3/5     PROM AZ JM AZ AZ KO KO AZ KO     R post TC mobs   AZ AZ KO KO AZ KO                               Neuro Re-Ed             TA isometrics 3x10 5s 3x10 5s 3x10 5s 3x10 5s 3x10  5s 3x10  5s 3x10 5s 3x10  5s       Marches 3x20 UE up 3x20 UE up 3x20 UE up 3x20 UE up 3x20  UE up 3x20  UE up 3x20 UE up 3x20  UE  up                                                                      Ther Ex             Bike 10' 10' 10' 10' 10' 10' 10' 10'     Hip add 30x5s 30x5s 30x5s 30x5s 30x5s 30x5s 30x5s 30x5s     Hip abd 30x5s black 30x5s black 30x5s black 30x5s black 30x5s  black 30x5s  black 30x5s black 30x5s  black     Bridges 20x ball 20x black 20x ball 20x black 30x ball 30x black 30x ball 30x black 30x  Ball  30x  black 30x  Ball  30x  black 30x ball 30x black 30x  Ball  30x  black     Clams 3x12 5s 3x12 5s 3x15 5s 3x15 5s 3x15  5s 3x15  5s 3x15  5s 3x15  5s     SL hip abd 2x10 2x10 3x10 3x12 3x15 3x15  3x15 3x15     SLRs  2x10 3x10 3x12 3x15 L  X30 R 3x10  L&R   3x10  3x15L  3x10R     Prone hip ext 2x10 2x10 3x10 3x12 3x15 3x15 3x15 3x15                  Heel raises  NT  derferred   3x10 x30 x30 x30     SL balance             Standing hamstring curls     3x10 3x10 3x10 7#  3x10     Standing hip abduction     3x10 X20  ea x30 7#  3x10     Squats      3x10 3x10 AE on  Chair  3x10     LAQ  4#  3x15 3x10 5#  5#  x30 5#  x30 3x10 7# 7#  3x10     Standing hip flexor str 3x30s 3x30s 3x30s 3x30s 3x30s 3x30s 3x30s 3x30s     Hip flexor str EOT 3x30s 3x30s 3x30s 3x30s 3x30s 3x30s 3x30s 3x30s     Prone quad str 3x30s 3x30s 3x30s 3x30s 3x30s 3x30s 3x30s 3x30s     Piriformis str    3x30s 3x30s 3x30s 3x30s 3x30s     Ther Activity                                       Gait Training                                       Modalities              10' 5'

## 2024-03-08 ENCOUNTER — TELEPHONE (OUTPATIENT)
Dept: PODIATRY | Facility: CLINIC | Age: 54
End: 2024-03-08

## 2024-03-08 ENCOUNTER — OFFICE VISIT (OUTPATIENT)
Dept: PHYSICAL THERAPY | Facility: MEDICAL CENTER | Age: 54
End: 2024-03-08
Payer: COMMERCIAL

## 2024-03-08 DIAGNOSIS — G89.29 CHRONIC PAIN OF RIGHT ANKLE: ICD-10-CM

## 2024-03-08 DIAGNOSIS — M16.12 PRIMARY OSTEOARTHRITIS OF LEFT HIP: ICD-10-CM

## 2024-03-08 DIAGNOSIS — Z96.642 STATUS POST TOTAL REPLACEMENT OF LEFT HIP: Primary | ICD-10-CM

## 2024-03-08 DIAGNOSIS — M25.571 CHRONIC PAIN OF RIGHT ANKLE: ICD-10-CM

## 2024-03-08 DIAGNOSIS — M76.71 PERONEAL TENDINITIS OF RIGHT LOWER EXTREMITY: Primary | ICD-10-CM

## 2024-03-08 PROCEDURE — 97110 THERAPEUTIC EXERCISES: CPT | Performed by: PHYSICAL THERAPIST

## 2024-03-08 PROCEDURE — 97140 MANUAL THERAPY 1/> REGIONS: CPT | Performed by: PHYSICAL THERAPIST

## 2024-03-08 RX ORDER — DICLOFENAC SODIUM 75 MG/1
75 TABLET, DELAYED RELEASE ORAL 2 TIMES DAILY
Qty: 60 TABLET | Refills: 0 | Status: SHIPPED | OUTPATIENT
Start: 2024-03-08 | End: 2024-04-07

## 2024-03-08 NOTE — PROGRESS NOTES
Daily Note     Today's date: 3/8/2024  Patient name: Emmanuel Bey  : 1970  MRN: [id number]  Referring provider: Hannah Wong PA-C  Dx:   Encounter Diagnosis     ICD-10-CM    1. Status post total replacement of left hip  Z96.642       2. Primary osteoarthritis of left hip  M16.12       3. Chronic pain of right ankle  M25.571     G89.29                      Subjective: Patient states that he is doing well.      Objective: See treatment diary below.      Assessment:  Patient presents c/ decreased right ankle pain.  His left hip is doing extremely well.  Patient demonstrates good tolerance to all progressions.  Tolerated treatment well. Patient would benefit from continued PT      Plan: Continue per plan of care.      Precautions: WBAT      Manuals 2/2 2/6 2/9 2/16 2/20 2/27 3/1 3/5 3/8    PROM AZ JM AZ AZ KO KO AZ KO AZ    R post TC mobs   AZ AZ KO KO AZ KO AZ                              Neuro Re-Ed             TA isometrics 3x10 5s 3x10 5s 3x10 5s 3x10 5s 3x10  5s 3x10  5s 3x10 5s 3x10  5s       Marches 3x20 UE up 3x20 UE up 3x20 UE up 3x20 UE up 3x20  UE up 3x20  UE up 3x20 UE up 3x20  UE  up                                                                      Ther Ex             Bike 10' 10' 10' 10' 10' 10' 10' 10' 10'    Hip add 30x5s 30x5s 30x5s 30x5s 30x5s 30x5s 30x5s 30x5s 20x5s c/ TA     Hip abd 30x5s black 30x5s black 30x5s black 30x5s black 30x5s  black 30x5s  black 30x5s black 30x5s  black 20x5s black c/ TA    Bridges 20x ball 20x black 20x ball 20x black 30x ball 30x black 30x ball 30x black 30x  Ball  30x  black 30x  Ball  30x  black 30x ball 30x black 30x  Ball  30x  black 20x ball 20x black    Clams 3x12 5s 3x12 5s 3x15 5s 3x15 5s 3x15  5s 3x15  5s 3x15  5s 3x15  5s 20x green    SL hip abd 2x10 2x10 3x10 3x12 3x15 3x15 3x15 3x15     SLRs  2x10 3x10 3x12 3x15 L  X30 R 3x10  L&R   3x10  3x15L  3x10R     Prone hip ext 2x10 2x10 3x10 3x12 3x15 3x15 3x15 3x15                  Heel raises   "NT  derferred   3x10 x30 x30 x30 3x15    SL balance         10x to failure airex    Standing hamstring curls     3x10 3x10 3x10 7#  3x10     Standing hip abduction     3x10 X20  ea x30 7#  3x10     Squats      3x10 3x10 AE on  Chair  3x10 3x10 to chair c/ airex green    Banded side steps         3x10' green    Step ups         2x10 10# KB    Lateral step ups         10x 8\"    LP         3x10 150#    LAQ  4#  3x15 3x10 5#  5#  x30 5#  x30 3x10 7# 7#  3x10     Standing hip flexor str 3x30s 3x30s 3x30s 3x30s 3x30s 3x30s 3x30s 3x30s 3x30s    Hip flexor str EOT 3x30s 3x30s 3x30s 3x30s 3x30s 3x30s 3x30s 3x30s 3x30s    Prone quad str 3x30s 3x30s 3x30s 3x30s 3x30s 3x30s 3x30s 3x30s 3x30s    Piriformis str    3x30s 3x30s 3x30s 3x30s 3x30s 3x30s    Ther Activity                                       Gait Training                                       Modalities              10' 5'                                                               "

## 2024-03-19 ENCOUNTER — OFFICE VISIT (OUTPATIENT)
Dept: PHYSICAL THERAPY | Facility: MEDICAL CENTER | Age: 54
End: 2024-03-19
Payer: COMMERCIAL

## 2024-03-19 DIAGNOSIS — M25.571 CHRONIC PAIN OF RIGHT ANKLE: ICD-10-CM

## 2024-03-19 DIAGNOSIS — M16.12 PRIMARY OSTEOARTHRITIS OF LEFT HIP: ICD-10-CM

## 2024-03-19 DIAGNOSIS — Z96.642 STATUS POST TOTAL REPLACEMENT OF LEFT HIP: Primary | ICD-10-CM

## 2024-03-19 DIAGNOSIS — G89.29 CHRONIC PAIN OF RIGHT ANKLE: ICD-10-CM

## 2024-03-19 PROCEDURE — 97110 THERAPEUTIC EXERCISES: CPT

## 2024-03-19 PROCEDURE — 97140 MANUAL THERAPY 1/> REGIONS: CPT

## 2024-03-19 NOTE — PROGRESS NOTES
Daily Note     Today's date: 3/19/2024  Patient name: Emmanuel Bey  : 1970  MRN: [id number]  Referring provider: Hannah Wong PA-C  Dx:   Encounter Diagnosis     ICD-10-CM    1. Status post total replacement of left hip  Z96.642       2. Primary osteoarthritis of left hip  M16.12       3. Chronic pain of right ankle  M25.571     G89.29                      Subjective: Pt reports that his L knee has been hurting since he was away on vacation.  Pt did not have one particular incident that caused the pain, but states that he walked over 13,000 steps one day and was also dancing a lot.  Pt denies pain at rest or at nighttime, but increases with WB.        Objective: See treatment diary below      Assessment: Tolerated treatment well. Patient  was able to perform his WB and non WB ex's without any increased knee pain   Pt's knee felt better post initial MHP application and with non WB ex's,  therefore he was able to complete all WB ex's as noted below.        Plan: Continue per plan of care.      Precautions: WBAT      Manuals 2/2 2/6 2/9 2/16 2/20 2/27 3/1 3/5 3/8 3/19   PROM AZ JM AZ AZ KO KO AZ KO AZ KO   R post TC mobs   AZ AZ KO KO AZ KO AZ KO                             Neuro Re-Ed             TA isometrics 3x10 5s 3x10 5s 3x10 5s 3x10 5s 3x10  5s 3x10  5s 3x10 5s 3x10  5s    3x10  5s   Marches 3x20 UE up 3x20 UE up 3x20 UE up 3x20 UE up 3x20  UE up 3x20  UE up 3x20 UE up 3x20  UE  up  3x20  UE  up                                                                    Ther Ex             Bike 10' 10' 10' 10' 10' 10' 10' 10' 10' 10'   Hip add 30x5s 30x5s 30x5s 30x5s 30x5s 30x5s 30x5s 30x5s 20x5s c/ TA  30x5s  W/TA   Hip abd 30x5s black 30x5s black 30x5s black 30x5s black 30x5s  black 30x5s  black 30x5s black 30x5s  black 20x5s black c/ TA 30x5s  Black  W/TA   Bridges 20x ball 20x black 20x ball 20x black 30x ball 30x black 30x ball 30x black 30x  Ball  30x  black 30x  Ball  30x  black 30x ball 30x black  "30x  Ball  30x  black 20x ball 20x black 30x  Ball  20x  black   Clams 3x12 5s 3x12 5s 3x15 5s 3x15 5s 3x15  5s 3x15  5s 3x15  5s 3x15  5s 20x green 30x  black   SL hip abd 2x10 2x10 3x10 3x12 3x15 3x15 3x15 3x15  30x   SLRs  2x10 3x10 3x12 3x15 L  X30 R 3x10  L&R   3x10  3x15L  3x10R  x30   Prone hip ext 2x10 2x10 3x10 3x12 3x15 3x15 3x15 3x15  3x10                Heel raises  NT  derferred   3x10 x30 x30 x30 3x15 3x10   SL balance         10x to failure airex 10x to  Failure  airex   Standing hamstring curls     3x10 3x10 3x10 7#  3x10  7#  x30   Standing hip abduction     3x10 X20  ea x30 7#  3x10  7#  x30   Squats      3x10 3x10 AE on  Chair  3x10 3x10 to chair c/ airex green 3x10  To chair  W/AE  green   Banded side steps         3x10' green 3x10'  green   Step ups         2x10 10# KB X30  10#KB   Lateral step ups         10x 8\" 8\"  x30   LP         3x10 150# 3x10  150#   LAQ  4#  3x15 3x10 5#  5#  x30 5#  x30 3x10 7# 7#  3x10  7#  x30   Standing hip flexor str 3x30s 3x30s 3x30s 3x30s 3x30s 3x30s 3x30s 3x30s 3x30s 3x30s   Hip flexor str EOT 3x30s 3x30s 3x30s 3x30s 3x30s 3x30s 3x30s 3x30s 3x30s 3x30s     Prone quad str 3x30s 3x30s 3x30s 3x30s 3x30s 3x30s 3x30s 3x30s 3x30s 3x30s   Piriformis str    3x30s 3x30s 3x30s 3x30s 3x30s 3x30s 3x30s   Ther Activity                                       Gait Training                                       Modalities          3/19   MH 10' 5'        Pre tx  15'                                                         "

## 2024-03-22 ENCOUNTER — OFFICE VISIT (OUTPATIENT)
Dept: PHYSICAL THERAPY | Facility: MEDICAL CENTER | Age: 54
End: 2024-03-22
Payer: COMMERCIAL

## 2024-03-22 DIAGNOSIS — Z96.642 STATUS POST TOTAL REPLACEMENT OF LEFT HIP: Primary | ICD-10-CM

## 2024-03-22 DIAGNOSIS — M16.12 PRIMARY OSTEOARTHRITIS OF LEFT HIP: ICD-10-CM

## 2024-03-22 DIAGNOSIS — M25.571 CHRONIC PAIN OF RIGHT ANKLE: ICD-10-CM

## 2024-03-22 DIAGNOSIS — G89.29 CHRONIC PAIN OF RIGHT ANKLE: ICD-10-CM

## 2024-03-22 PROCEDURE — 97110 THERAPEUTIC EXERCISES: CPT | Performed by: PHYSICAL THERAPIST

## 2024-03-22 PROCEDURE — 97140 MANUAL THERAPY 1/> REGIONS: CPT | Performed by: PHYSICAL THERAPIST

## 2024-03-22 NOTE — PROGRESS NOTES
"Daily Note     Today's date: 3/22/2024  Patient name: Emmanuel Bey  : 1970  MRN: [id number]  Referring provider: Hannah Wong PA-C  Dx:   Encounter Diagnosis     ICD-10-CM    1. Status post total replacement of left hip  Z96.642       2. Primary osteoarthritis of left hip  M16.12       3. Chronic pain of right ankle  M25.571     G89.29                      Subjective: Patient state that his hip is doing well.  He has some left knee pain since vacation, likely d/t increased walking and dancing.      Objective: See treatment diary below.      Assessment:  Patient demonstrates good tolerance to progressions without hip pain.  Tolerated treatment well. Patient would benefit from continued PT.      Plan: Continue per plan of care.      Precautions: WBAT      Manuals 3/22                                                                Neuro Re-Ed                                                                 Ther Ex             Bike 10'            Hip add 30x5s c/ TA            Hip abd 30x5s black c/ TA            Bridges 30x ball 30x black            Clams 30x black            Heel raises 3x12            SL balance 10x to failure airex                         Standing hip abduction 3x10 airex            Squats 3x10 blue 10# KB            Banded side steps 3x10' blue            Step ups 3x10 10# KB            Lateral step ups 3x10 8\"            LP 3x12 150#            Standing hip flexor str 3x30s            Hip flexor str EOT 3x30s            Prone quad str 3x30s            Piriformis str 3x30s            Ther Activity                                       Gait Training                                       Modalities              10'                                                                    "

## 2024-03-26 ENCOUNTER — OFFICE VISIT (OUTPATIENT)
Dept: PHYSICAL THERAPY | Facility: MEDICAL CENTER | Age: 54
End: 2024-03-26
Payer: COMMERCIAL

## 2024-03-26 DIAGNOSIS — M16.12 PRIMARY OSTEOARTHRITIS OF LEFT HIP: ICD-10-CM

## 2024-03-26 DIAGNOSIS — G89.29 CHRONIC PAIN OF RIGHT ANKLE: ICD-10-CM

## 2024-03-26 DIAGNOSIS — M25.571 CHRONIC PAIN OF RIGHT ANKLE: ICD-10-CM

## 2024-03-26 DIAGNOSIS — Z96.642 STATUS POST TOTAL REPLACEMENT OF LEFT HIP: Primary | ICD-10-CM

## 2024-03-26 PROCEDURE — 97110 THERAPEUTIC EXERCISES: CPT

## 2024-03-26 PROCEDURE — 97112 NEUROMUSCULAR REEDUCATION: CPT

## 2024-03-26 NOTE — PROGRESS NOTES
"Daily Note     Today's date: 3/26/2024  Patient name: Emmanuel Bey  : 1970  MRN: [id number]  Referring provider: Hannah Wong PA-C  Dx:   Encounter Diagnosis     ICD-10-CM    1. Status post total replacement of left hip  Z96.642       2. Primary osteoarthritis of left hip  M16.12       3. Chronic pain of right ankle  M25.571     G89.29                      Subjective: Pt reports that his L knee continues to be problematic.  Pt states that his ankle and hip are feeling good.      Objective: See treatment diary below      Assessment: Tolerated treatment well. Patient demonstrated fatigue post treatment, exhibited good technique with therapeutic exercises, and would benefit from continued PT  Pt demonstrates improved strength and endurance with ex's.       Plan: Potential discharge next visit.     Precautions: WBAT      Manuals 3/22 3/26                                                               Neuro Re-Ed                                                                 Ther Ex             Bike 10' 10'           Hip add 30x5s c/ TA 30x5s  W/TA           Hip abd 30x5s black c/ TA 30x5s  Black  W/TA           Bridges 30x ball 30x black 30x  Ball  30x  black           Clams 30x black 30x  black           Heel raises 3x12 3x12           SL balance 10x to failure airex 10x  To  Failure  airex                        Standing hip abduction 3x10 airex 3x10  airex           Squats 3x10 blue 10# KB 3x10  Blue  10#  KB           Banded side steps 3x10' blue 3x10'  blue           Step ups 3x10 10# KB 3x10  10#  KB           Lateral step ups 3x10 8\" 3x10  8\"           LP 3x12 150# 3x12  150#           Standing hip flexor str 3x30s 3x30s           Hip flexor str EOT 3x30s 3x30s           Prone quad str 3x30s 3x30s           Piriformis str 3x30s 3x30s           Ther Activity                                       Gait Training                                       Modalities              10'                       "

## 2024-03-29 ENCOUNTER — OFFICE VISIT (OUTPATIENT)
Dept: PHYSICAL THERAPY | Facility: MEDICAL CENTER | Age: 54
End: 2024-03-29
Payer: COMMERCIAL

## 2024-03-29 DIAGNOSIS — G89.29 CHRONIC PAIN OF RIGHT ANKLE: ICD-10-CM

## 2024-03-29 DIAGNOSIS — Z96.642 STATUS POST TOTAL REPLACEMENT OF LEFT HIP: Primary | ICD-10-CM

## 2024-03-29 DIAGNOSIS — M16.12 PRIMARY OSTEOARTHRITIS OF LEFT HIP: ICD-10-CM

## 2024-03-29 DIAGNOSIS — M25.571 CHRONIC PAIN OF RIGHT ANKLE: ICD-10-CM

## 2024-03-29 PROCEDURE — 97110 THERAPEUTIC EXERCISES: CPT | Performed by: PHYSICAL THERAPIST

## 2024-03-29 PROCEDURE — 97164 PT RE-EVAL EST PLAN CARE: CPT | Performed by: PHYSICAL THERAPIST

## 2024-03-29 NOTE — PROGRESS NOTES
Progress Note     Today's date: 3/29/2024  Patient name: Emmanuel Bey  : 1970  MRN: [id number]  Referring provider: Hannah Wong PA-C  Dx:   Encounter Diagnosis     ICD-10-CM    1. Status post total replacement of left hip  Z96.642       2. Primary osteoarthritis of left hip  M16.12       3. Chronic pain of right ankle  M25.571     G89.29                      Subjective: Patient states that the left hip is great and the right ankle continues to improve.  He is limited by left knee pain at this time and is going to follow up c/ Dr. Grayson regarding this new issue.    Hip pain= 0/10      Objective: See treatment diary below.    L hip ROM:  WFL    L hip strength:  5/5 throughout      Assessment: Emmanuel Bey has been compliant with attending PT and home exercise program since initial eval.  Emmanuel  has made improvements in objective data since initial evalulation and has achieved all goals.  Patient reports having returned to their prior level or function. Patient provided with updated Home Exercise Program, all questions answered, verbalized understanding and agreement to plan of care. Thus it was mutually decided to discontinue this episode of care and transition to Home Exercise Program.    Goals  Impairment Goals  - Pt I with initial HEP in 1-2 visits- achieved  - Improve ROM equal to contralateral side in 6 weeks- achieved  - Increase strength to at least 3+/5 in all affected areas in 4-6 weeks- achieved    Functional Goals  - Increase Functional Status Measure to: goal status in 6-8 weeks- achieved  - Patient will be independent with comprehensive HEP in 6-8 weeks- achieved  - Ambulation is improved to prior level of function in 6-8 weeks- achieved  - Stair climbing is improved to prior level of function in 6-8 weeks- achieved  - Squatting is improved to prior level of function in 6-8 weeks- achieved      Plan:  d/c to HEP.     Precautions: WBAT      Manuals 3/22 3/26 3/29                           "                                    Neuro Re-Ed                                                                 Ther Ex             Bike 10' 10'           Hip add 30x5s c/ TA 30x5s  W/TA           Hip abd 30x5s black c/ TA 30x5s  Black  W/TA           Bridges 30x ball 30x black 30x  Ball  30x  black           Clams 30x black 30x  black           Heel raises 3x12 3x12           SL balance 10x to failure airex 10x  To  Failure  airex                        Standing hip abduction 3x10 airex 3x10  airex           Squats 3x10 blue 10# KB 3x10  Blue  10#  KB           Banded side steps 3x10' blue 3x10'  blue           Step ups 3x10 10# KB 3x10  10#  KB           Lateral step ups 3x10 8\" 3x10  8\"           LP 3x12 150# 3x12  150#           Standing hip flexor str 3x30s 3x30s 3x30s          Hip flexor str EOT 3x30s 3x30s 3x30s          Prone quad str 3x30s 3x30s 3x30s          Piriformis str 3x30s 3x30s 3x30s          Ther Activity                                       Gait Training                                       Modalities              10'                                                                        "

## 2024-07-19 ENCOUNTER — OFFICE VISIT (OUTPATIENT)
Dept: UROLOGY | Facility: CLINIC | Age: 54
End: 2024-07-19
Payer: COMMERCIAL

## 2024-07-19 VITALS
DIASTOLIC BLOOD PRESSURE: 88 MMHG | BODY MASS INDEX: 41.37 KG/M2 | WEIGHT: 289 LBS | SYSTOLIC BLOOD PRESSURE: 122 MMHG | HEART RATE: 88 BPM | HEIGHT: 70 IN | OXYGEN SATURATION: 96 %

## 2024-07-19 DIAGNOSIS — E66.01 SEVERE OBESITY (BMI >= 40) (HCC): Primary | ICD-10-CM

## 2024-07-19 DIAGNOSIS — Z30.2 ENCOUNTER FOR STERILIZATION: ICD-10-CM

## 2024-07-19 DIAGNOSIS — B07.9 VIRAL WARTS, UNSPECIFIED TYPE: ICD-10-CM

## 2024-07-19 PROCEDURE — 99213 OFFICE O/P EST LOW 20 MIN: CPT

## 2024-07-19 RX ORDER — LORAZEPAM 1 MG/1
1 TABLET ORAL
Qty: 1 TABLET | Refills: 0 | Status: SHIPPED | OUTPATIENT
Start: 2024-07-19

## 2024-07-19 RX ORDER — CLINDAMYCIN HYDROCHLORIDE 300 MG/1
CAPSULE ORAL
COMMUNITY
Start: 2024-04-13

## 2024-07-19 NOTE — PROGRESS NOTES
7/19/2024      No chief complaint on file.        Assessment and Plan    54 y.o. male managed by new patient    1. Desire for elective sterilization  - exam today as below  - informed consent signed today  - continue contraception  - rx Ativan with  to/from on appt date  - shave scrotal/pubic hair day prior to appt date    Return for vasectomy as scheduled.    2. Desire for PCP  -referral for internal medicine given as patient wants all providers to be at [facility]    3. Hx of HPV/warts  -dermatology referral     4. Prostate Cancer Screening  -patient defers prostate cancer screening at today's visit with both PSA/HILARY    History of Present Illness  Emmanuel Bey is a 54 y.o. male here for evaluation of VASECTOMY CONSULT    History of genitourinary or groin trauma or surgery-no   Fathered children-no  Personal and/or mutual desire for permanent sterility-yes. Recently  has had extensive conversation   Current contraceptive method-abstinence   Work/manual labor/lifting-no  Voiding issues- none  Bleeding issues/thinners- none  Allergies to lidocaine/marcaine/betadine/chromic- none    The patient presents requesting elective sterilization vasectomy.     We discussed that vasectomy is in operation performed in the office in order to provide elective sterilization. This procedure should be considered a permanent option. Although there are subspecialists who perform vasectomy reversals, these operations are not 100% successful and are often not covered by insurance meaning they can come with a large out-of-pocket cost. The patient understands this.     We reviewed the procedure in depth. Risks and benefits of the procedure were discussed and reviewed. Risks described included hematoma formation, Infection, sperm granuloma, epididymitis, post-vasectomy pain syndrome, and vasectomy failure  Informed consent was obtained in the office today. The patient was prescribed a benzodiazepine to take one hour prior to  the procedure to assist with his comfort.  He understands that he will require transportation to and from the office that day if he is to use the benzodiazepine.       He also understands he will require semen analysis testing at 3 months post procedure to ensure full sterilization.  In the interim, he will require contraception during intercourse to avoid an undesired pregnancy.     Usually, patients are out of work for 2-3 days. We recommend tight fitting scrotal support following the procedure along with ice packs applied to the scrotum 15 minutes on and 15 minutes off for the first 24 hours. We discussed that we do send the patient home with short course of anti-inflammatory and/or narcotic pain medication.     After this discussion, the patient agrees to proceed. We will schedule him in the near future.    He agrees to take oral sedative -Ativan 1 mg one hour prior to procedure.        Review of Systems        AUA SYMPTOM SCORE      Flowsheet Row Most Recent Value   AUA SYMPTOM SCORE    How often have you had a sensation of not emptying your bladder completely after you finished urinating? 0 (P)     How often have you had to urinate again less than two hours after you finished urinating? 1 (P)     How often have you found you stopped and started again several times when you urinate? 0 (P)     How often have you found it difficult to postpone urination? 1 (P)     How often have you had a weak urinary stream? 0 (P)     How often have you had to push or strain to begin urination? 0 (P)     How many times did you most typically get up to urinate from the time you went to bed at night until the time you got up in the morning? 2 (P)     Quality of Life: If you were to spend the rest of your life with your urinary condition just the way it is now, how would you feel about that? 0 (P)     AUA SYMPTOM SCORE 4 (P)               Vitals  There were no vitals filed for this visit.    Physical Exam    General: Well  appearing, no distress, appears stated age.  HEENT:  Normocephalic, atraumatic. Conjunctiva clear.  Respiratory: Nonlabored respirations, no wheeze or cough  Abdomen:  Soft nontender without hernia. No suprapubic or CVA tenderness.  Genitourinary: Circumcised penis, normal phallus, orthotopic patent meatus.  Testes smooth descended bilaterally into the scrotum nontender with no palpable mass.  Palpably normal spermatic cord and vas deferens bilaterally.  Musculoskeletal:  Normal range of motion and gait without defecit.  Neuro: No gross neurologic defect or abnormality. Steady unassisted gait. Speech and affect normal.  Dermatologic: skin warm, dry; no rash erythema or ecchymosis      Past History  Past Medical History:   Diagnosis Date    Allergy-induced asthma     Arthritis     Asthma     seasonsl allergy induced    Colon polyp     PONV (postoperative nausea and vomiting)      Social History     Socioeconomic History    Marital status: Unknown     Spouse name: None    Number of children: None    Years of education: None    Highest education level: None   Occupational History    None   Tobacco Use    Smoking status: Never    Smokeless tobacco: Never   Vaping Use    Vaping status: Never Used   Substance and Sexual Activity    Alcohol use: Not Currently     Alcohol/week: 1.0 standard drink of alcohol     Types: 1 Standard drinks or equivalent per week     Comment: probably more like 0.1 drinks per week on average    Drug use: Never    Sexual activity: Not Currently     Partners: Female   Other Topics Concern    None   Social History Narrative    None     Social Determinants of Health     Financial Resource Strain: Not on file   Food Insecurity: Not on file   Transportation Needs: Not on file   Physical Activity: Not on file   Stress: Not on file   Social Connections: Not on file   Intimate Partner Violence: Not on file   Housing Stability: Not on file     Social History     Tobacco Use   Smoking Status Never  "  Smokeless Tobacco Never     Family History   Problem Relation Age of Onset    Cancer Father          of colon cancer at age 75    Diabetes Father        The following portions of the patient's history were reviewed and updated as appropriate: allergies, current medications, past medical history, past social history, past surgical history and problem list.    Results  No results found for this or any previous visit (from the past 1 hour(s)).]  No results found for: \"PSA\"  Lab Results   Component Value Date    CALCIUM 9.2 2023    K 4.2 2023    CO2 23 2023     2023    BUN 19 2023    CREATININE 0.84 2023     Lab Results   Component Value Date    WBC 6.30 2023    HGB 13.4 2023    HCT 40.2 2023    MCV 93 2023     2023       "

## 2024-07-19 NOTE — PROGRESS NOTES
7/19/2024      No chief complaint on file.        Assessment and Plan    54 y.o. male managed by new patient    1. Desire for elective sterilization  - exam today as below  - informed consent signed today  - continue contraception  - rx Ativan with  to/from on appt date  - shave scrotal/pubic hair day prior to appt date    Return for vasectomy as scheduled.      History of Present Illness  Emmanuel Bey is a 54 y.o. male here for evaluation of VASECTOMY CONSULT    History of genitourinary or groin trauma or surgery-  Fathered children-  Personal and/or mutual desire for permanent sterility-yes and ir   Current contraceptive method-abstaining until then   Work/manual labor/lifting-no   Voiding issues- none  Bleeding issues/thinners- none  Allergies to lidocaine/marcaine/betadine/chromic- none    The patient presents requesting elective sterilization vasectomy.     We discussed that vasectomy is in operation performed in the office in order to provide elective sterilization. This procedure should be considered a permanent option. Although there are subspecialists who perform vasectomy reversals, these operations are not 100% successful and are often not covered by insurance meaning they can come with a large out-of-pocket cost. The patient understands this.     We reviewed the procedure in depth. Risks and benefits of the procedure were discussed and reviewed. Risks described included hematoma formation, Infection, sperm granuloma, epididymitis, post-vasectomy pain syndrome, and vasectomy failure  Informed consent was obtained in the office today. The patient was prescribed a benzodiazepine to take one hour prior to the procedure to assist with his comfort.  He understands that he will require transportation to and from the office that day if he is to use the benzodiazepine.       He also understands he will require semen analysis testing at 3 months post procedure to ensure full sterilization.  In the  "interim, he will require contraception during intercourse to avoid an undesired pregnancy.     Usually, patients are out of work for 2-3 days. We recommend tight fitting scrotal support following the procedure along with ice packs applied to the scrotum 15 minutes on and 15 minutes off for the first 24 hours. We discussed that we do send the patient home with short course of anti-inflammatory and/or narcotic pain medication.     After this discussion, the patient agrees to proceed. We will schedule him in the near future.    He agrees to take oral sedative -Ativan 1 mg one hour prior to procedure.        Review of Systems        AUA SYMPTOM SCORE      Flowsheet Row Most Recent Value   AUA SYMPTOM SCORE    How often have you had a sensation of not emptying your bladder completely after you finished urinating? 0 (P)     How often have you had to urinate again less than two hours after you finished urinating? 1 (P)     How often have you found you stopped and started again several times when you urinate? 0 (P)     How often have you found it difficult to postpone urination? 1 (P)     How often have you had a weak urinary stream? 0 (P)     How often have you had to push or strain to begin urination? 0 (P)     How many times did you most typically get up to urinate from the time you went to bed at night until the time you got up in the morning? 2 (P)     Quality of Life: If you were to spend the rest of your life with your urinary condition just the way it is now, how would you feel about that? 0 (P)     AUA SYMPTOM SCORE 4 (P)               Vitals  Vitals:    07/19/24 0830   BP: 122/88   BP Location: Left arm   Patient Position: Standing   Cuff Size: Standard   Pulse: 88   SpO2: 96%   Weight: 131 kg (289 lb)   Height: 5' 10\" (1.778 m)       Physical Exam    General: Well appearing, no distress, appears stated age.  HEENT:  Normocephalic, atraumatic. Conjunctiva clear.  Respiratory: Nonlabored respirations, no wheeze or " cough  Abdomen:  Soft nontender without hernia. No suprapubic or CVA tenderness.  Genitourinary: Circumcised penis, normal phallus, orthotopic patent meatus.  Testes smooth descended bilaterally into the scrotum nontender with no palpable mass.  Palpably normal spermatic cord and vas deferens bilaterally.  Musculoskeletal:  Normal range of motion and gait without defecit.  Neuro: No gross neurologic defect or abnormality. Steady unassisted gait. Speech and affect normal.  Dermatologic: skin warm, dry; no rash erythema or ecchymosis      Past History  Past Medical History:   Diagnosis Date   • Allergy-induced asthma    • Arthritis    • Asthma     seasonsl allergy induced   • Colon polyp    • PONV (postoperative nausea and vomiting)      Social History     Socioeconomic History   • Marital status: Unknown     Spouse name: None   • Number of children: None   • Years of education: None   • Highest education level: None   Occupational History   • None   Tobacco Use   • Smoking status: Never   • Smokeless tobacco: Never   Vaping Use   • Vaping status: Never Used   Substance and Sexual Activity   • Alcohol use: Not Currently     Alcohol/week: 1.0 standard drink of alcohol     Types: 1 Standard drinks or equivalent per week     Comment: probably more like 0.1 drinks per week on average   • Drug use: Never   • Sexual activity: Not Currently     Partners: Female   Other Topics Concern   • None   Social History Narrative   • None     Social Determinants of Health     Financial Resource Strain: Not on file   Food Insecurity: Not on file   Transportation Needs: Not on file   Physical Activity: Not on file   Stress: Not on file   Social Connections: Not on file   Intimate Partner Violence: Not on file   Housing Stability: Not on file     Social History     Tobacco Use   Smoking Status Never   Smokeless Tobacco Never     Family History   Problem Relation Age of Onset   • Cancer Father          of colon cancer at age 75   •  "Diabetes Father        The following portions of the patient's history were reviewed and updated as appropriate: allergies, current medications, past medical history, past social history, past surgical history and problem list.    Results  No results found for this or any previous visit (from the past 1 hour(s)).]  No results found for: \"PSA\"  Lab Results   Component Value Date    CALCIUM 9.2 11/30/2023    K 4.2 11/30/2023    CO2 23 11/30/2023     11/30/2023    BUN 19 11/30/2023    CREATININE 0.84 11/30/2023     Lab Results   Component Value Date    WBC 6.30 11/30/2023    HGB 13.4 11/30/2023    HCT 40.2 11/30/2023    MCV 93 11/30/2023     11/30/2023       "

## 2024-09-23 ENCOUNTER — TELEPHONE (OUTPATIENT)
Age: 54
End: 2024-09-23

## 2024-09-26 ENCOUNTER — PROCEDURE VISIT (OUTPATIENT)
Dept: UROLOGY | Facility: CLINIC | Age: 54
End: 2024-09-26
Payer: COMMERCIAL

## 2024-09-26 VITALS
HEIGHT: 70 IN | DIASTOLIC BLOOD PRESSURE: 86 MMHG | HEART RATE: 91 BPM | WEIGHT: 294 LBS | SYSTOLIC BLOOD PRESSURE: 140 MMHG | OXYGEN SATURATION: 98 % | BODY MASS INDEX: 42.09 KG/M2

## 2024-09-26 DIAGNOSIS — Z30.2 ENCOUNTER FOR STERILIZATION: Primary | ICD-10-CM

## 2024-09-26 PROCEDURE — 88302 TISSUE EXAM BY PATHOLOGIST: CPT | Performed by: PATHOLOGY

## 2024-09-26 PROCEDURE — 55250 REMOVAL OF SPERM DUCT(S): CPT | Performed by: UROLOGY

## 2024-09-26 NOTE — PROGRESS NOTES
Vasectomy     Date/Time  9/26/2024 1:30 PM     Performed by  Shailesh Conde MD   Authorized by  Shailesh Conde MD         Emmanuel is a 54-year-old male who request elective sterilization with vasectomy.  He is recently .  He has no children.  He states that he desires no future children.  His new wife is 39 years of age.      Vasectomy Procedure Note:  Risks and benefits of vasectomy were previously discussed. Informed consent was obtained at his prior office visit. The patient had taken Ativan as prescribed.  The patient was placed in the supine position. His genitalia was prepped and draped in a sterile fashion. The left vas deferens was grasped and presented to the midline of the scrotum. 2% lidocaine was used to anesthetize the skin, subcutaneous tissue, and left vas deferens. A scalpeless opening was made in the midline of the scrotum. The left vas deferens was grasped and presented into the surgical field. A #15 blade was used to make a longitudinal incision in the sheath of the vas deferens. The vas itself was then dissected free from the surrounding tissue. Clamps were placed proximally and distally and a 1 cm segment of the vas deferens was excised. Silk ties were applied and exposed ends were fulgurated. Hemostasis was excellent. The vas was returned to its natural anatomic position and the same procedure was then repeated on the patient's right side. A single stitch was placed through the skin and dartos to reapproximate the defect. Bacitracin ointment was applied.      The patient is status post vasectomy. I recommended rest, ice, and scrotal support. I've asked that he return in the next 2-3 weeks for a post vasectomy check. Tylenol/Advil as needed for pain. The patient understands that he is not yet considered sterile. I recommend a single post vasectomy semen analysis at 8 weeks. In the interim I have recommended contraception to avoid an undesired pregnancy.

## 2024-09-30 PROCEDURE — 88302 TISSUE EXAM BY PATHOLOGIST: CPT | Performed by: PATHOLOGY

## 2024-10-04 ENCOUNTER — OFFICE VISIT (OUTPATIENT)
Dept: PODIATRY | Facility: CLINIC | Age: 54
End: 2024-10-04
Payer: COMMERCIAL

## 2024-10-04 VITALS
HEIGHT: 70 IN | WEIGHT: 294 LBS | SYSTOLIC BLOOD PRESSURE: 128 MMHG | DIASTOLIC BLOOD PRESSURE: 60 MMHG | BODY MASS INDEX: 42.09 KG/M2

## 2024-10-04 DIAGNOSIS — M79.671 RIGHT FOOT PAIN: Primary | ICD-10-CM

## 2024-10-04 DIAGNOSIS — M25.571 CHRONIC PAIN OF RIGHT ANKLE: ICD-10-CM

## 2024-10-04 DIAGNOSIS — M72.2 PLANTAR FASCIITIS OF RIGHT FOOT: ICD-10-CM

## 2024-10-04 DIAGNOSIS — M21.41 PES PLANUS OF RIGHT FOOT: ICD-10-CM

## 2024-10-04 DIAGNOSIS — M76.71 PERONEAL TENDINITIS OF RIGHT LOWER EXTREMITY: ICD-10-CM

## 2024-10-04 DIAGNOSIS — G89.29 CHRONIC PAIN OF RIGHT ANKLE: ICD-10-CM

## 2024-10-04 PROCEDURE — 99213 OFFICE O/P EST LOW 20 MIN: CPT

## 2024-10-04 NOTE — PROGRESS NOTES
Ambulatory Visit  Name: Emmanuel Bey      : 1970      MRN: [id number]  Encounter Provider: Jayme Murray DPM  Encounter Date: 10/4/2024   Encounter department: [facility] [place]    Assessment & Plan  Right foot pain    Orders:    XR foot 3+ vw right    Chronic pain of right ankle    Orders:    XR ankle 3+ vw right    Peroneal tendinitis of right lower extremity         Pes planus of right foot         Plantar fasciitis of right foot         -Patient was educated regarding their condition.  -Rest, ice, compression, elevation   -Rx custom orthotics   -Recommend daily use of supportive footwear  -PT for proprioception and strength training to right ankle. Patient was instructed to begin this as soon as possible  -RTC 2-3 months    -X-ray of right ankle from today reviewed: Moderate midfoot collapse at the talonavicular joint, mild to moderate talar head uncovering, degenerative joint changes noted at midfoot, no acute osseous abnormalities noted    -X-ray of right foot from today reviewed by myself: Moderate joint space narrowing at anterior ankle on lateral view, ankle joint well aligned on AP view, no acute osseous abnormalities noted    History of Present Illness     Emmanuel Bey is a 54 y.o. male who presents with pain in right foot and ankle. Pain in right ankle has been ongoing for 2 years. He has been wearing an ankle brace for the past 4 months. Does not recall any trauma to right ankle. Compression sleeve at right ankle helps relieve pain as well.  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  He does wear supportive sneakers but does not have orthotics.  Has tried Voltaren gel with no improvement.  Has tried over-the-counter anti-inflammatory medicine with minimal improvement.  Denies any weakness or numbness to the feet.  Has no other podiatric concerns at this time.    History obtained from : patient    Review of Systems  Current Outpatient Medications on File Prior  "to Visit   Medication Sig Dispense Refill    clindamycin (CLEOCIN) 300 MG capsule TAKE 2 CAPSULES BY MOUTH 1 HOUR PRIOR TO DENTAL PROCEDURE/CLEANING      diphenhydrAMINE (BENADRYL) 25 mg capsule Take 25 mg by mouth every 6 (six) hours as needed      Multiple Vitamin (Daily Vites) tablet Take 1 tablet by mouth daily      acetaminophen (TYLENOL) 500 mg tablet Take 2 tablets (1,000 mg total) by mouth every 8 (eight) hours (Patient not taking: Reported on 7/19/2024) 60 tablet 0    albuterol (2.5 mg/3 mL) 0.083 % nebulizer solution every 6 (six) hours as needed (Patient not taking: Reported on 7/19/2024)      diclofenac (VOLTAREN) 75 mg EC tablet Take 1 tablet (75 mg total) by mouth 2 (two) times a day 60 tablet 0    LORazepam (ATIVAN) 1 mg tablet Take 1 tablet (1 mg total) by mouth 30 min pre-procedure 1 tablet 0    Menthol (Halls Cough Drops) 5.8 MG LOZG Apply to the mouth or throat if needed (Patient not taking: Reported on 7/19/2024)      ondansetron (ZOFRAN-ODT) 4 mg disintegrating tablet Take 1 tablet (4 mg total) by mouth every 6 (six) hours as needed for nausea or vomiting (Patient not taking: Reported on 7/19/2024) 20 tablet 0     No current facility-administered medications on file prior to visit.          Objective     /60   Ht 5' 10\" (1.778 m)   Wt 133 kg (294 lb)   BMI 42.18 kg/m²     Physical Exam  Vascular:  -DP and PT pulses intact b/l  -Capillary refill time <2 sec b/l    MSK:  -Pain on palpation right lateral ankle along course of peroneal tendon distal but to lateral malleolus  -Right pes planus foot deformity noted with heel valgus  -Anterior drawer: Negative  -Talar tilt: Negative  -External rotation stress test: Negative  -MMT is 5/5 to all muscle compartments of the lower extremity  -Patient able to perform double heel raise, pain with single heel raise left foot, pain in medial and lateral ankle  -Ankle dorsiflexion >10 degrees with knee extended and knee flexed b/l  -Pain on palpation of " medial calcaneal tubercle at the insertion site of the plantar fascia  -no pain with compression of both sides of heel    Biomechanical Exam of right LE:    Standing exam: 1st MTPJ ROM WNL. Toes purchase floor adequately with too many toe signs noted.  Medial arch height decreased. Resting calcaneal stance position about 3deg valgus. Heels do not with heel rise.  Tibiotalar joint is in rectus position with mild lateral bowing of the Achilles tendon noted.     STJ ROM WNL b/l, heel inversion is approximately 20° on right and 20° on left; heel eversion is approximately 10° on right and 10° on left; neutral calcaneal stance position is 0°   1st ray ROM WNL b/l, able to dorsiflex/plantarflex b/l    Gait analysis: pronated    Gross deformity noted: pes planus    Neuro:  -Light sensation intact bilaterally  -Protective sensation intact bilaterally  -tinel sign negative    Derm:  -No lesions, abrasions, or open wounds noted  -Edema noted at right lateral ankle  -Ecchymosis not present

## 2024-10-25 ENCOUNTER — EVALUATION (OUTPATIENT)
Dept: PHYSICAL THERAPY | Facility: OTHER | Age: 54
End: 2024-10-25
Payer: COMMERCIAL

## 2024-10-25 DIAGNOSIS — M25.571 CHRONIC PAIN OF RIGHT ANKLE: ICD-10-CM

## 2024-10-25 DIAGNOSIS — G89.29 CHRONIC PAIN OF RIGHT ANKLE: ICD-10-CM

## 2024-10-25 DIAGNOSIS — M76.71 PERONEAL TENDINITIS OF RIGHT LOWER EXTREMITY: ICD-10-CM

## 2024-10-25 DIAGNOSIS — M79.671 RIGHT FOOT PAIN: ICD-10-CM

## 2024-10-25 PROCEDURE — 97760 ORTHOTIC MGMT&TRAING 1ST ENC: CPT | Performed by: PHYSICAL THERAPIST

## 2024-10-25 NOTE — PROGRESS NOTES
Orthotic Evaluation    Today's date: 10/25/24  Patient name: Emmanuel Bey  : 1970  MRN: [id number]  Referring provider: Jayme Murray DPM  Dx:   Encounter Diagnosis     ICD-10-CM    1. Right foot pain  M79.671 Ambulatory referral to Physical Therapy      2. Chronic pain of right ankle  M25.571 Ambulatory referral to Physical Therapy    G89.29       3. Peroneal tendinitis of right lower extremity  M76.71 Ambulatory referral to Physical Therapy                      Subjective:    Emmanuel presents today for orthotic evaluation. he complains of pain, decreased joint mobility, decreased sensation, balance dysfunction, and ambulatory dysfunction with functional activities including ambulation and leisure. The patient plans to use his orthotic for walking and standing. The orthotic will be placed in a wide, size 11.5 2E Renae Glycerin sneaker. Patient reports he is having pain in both his ankle and pain. Currently he feels ok because he has not been working due to illness. But, typically he has difficulty with first steps in the morning. He has swelling in his ankle with prolonged standing.     Objective:    Age: 54 y.o.  Weight: 290    Foot Posture Index Score    Right Foot  Talar dome - +2  Talonavicular bulge - +2  Medial longitudinal arch - +2  Malleolar curve - +1   Calcaneal eversion - +1  Too many toes - +2  Total Score R = 10    Left Foot  Talar dome - +2  Talonavicular bulge - +2  Medial longitudinal arch - +2  Malleolar curve - +1   Calcaneal eversion - +1  Too many toes - +1  Total Score L = 9    Reference Values  Normal =    0 to +5  Pronated =  +6 to +9 Highly Pronated =  10+  Supinated =   -1 to -4 Highly Supinated = -5 to -12    Objective     Joint Play   Left Ankle/Foot  Joints within functional limits are the midfoot and forefoot. Hypomobile in the talocrural joint and subtalar joint.     Right Ankle/Foot  Joints within functional limits are the midfoot and forefoot. Hypomobile in the talocrural  joint and subtalar joint.     General Comments:      Ankle/Foot Comments   Tenderness to palpate sinus tarsi    Gait- early excessive pronation    Heel raise- able to resupinate    Mini squat- excessive pronation        Assessment:    Patient requires custom foot orthosis with deepened heel cup and medial post to correct altered gait mechanics. Patient is not currently controlled by his current shoe wear. Patient was educated on the design of the custom orthotic and it's ability to offload his current pathology. Patient was also educated on potential shoe wear changes with device, break-in period, and skin checks to avoid potential skin break down.     Orthotic goals:  1) Patient will have custom foot orthoses fitted to his shoe.   2) Patient will be compliant with break-in period of custom foot orthoses as prescribed by PT.   3) Patient will be compliant with custom foot orthoses use as prescribed by PT.     Plan:    Planned therapy interventions: orthotic fitting/training  Duration in visits: 2

## 2024-10-26 ENCOUNTER — OFFICE VISIT (OUTPATIENT)
Dept: URGENT CARE | Age: 54
End: 2024-10-26
Payer: COMMERCIAL

## 2024-10-26 VITALS
SYSTOLIC BLOOD PRESSURE: 128 MMHG | BODY MASS INDEX: 42.09 KG/M2 | TEMPERATURE: 98.6 F | DIASTOLIC BLOOD PRESSURE: 77 MMHG | HEART RATE: 97 BPM | HEIGHT: 70 IN | OXYGEN SATURATION: 95 % | RESPIRATION RATE: 18 BRPM | WEIGHT: 294 LBS

## 2024-10-26 DIAGNOSIS — B96.89 ACUTE BACTERIAL BRONCHITIS: Primary | ICD-10-CM

## 2024-10-26 DIAGNOSIS — J20.8 ACUTE BACTERIAL BRONCHITIS: Primary | ICD-10-CM

## 2024-10-26 PROCEDURE — 99214 OFFICE O/P EST MOD 30 MIN: CPT

## 2024-10-26 RX ORDER — DOXYCYCLINE 100 MG/1
100 TABLET ORAL 2 TIMES DAILY
Qty: 14 TABLET | Refills: 0 | Status: SHIPPED | OUTPATIENT
Start: 2024-10-26 | End: 2024-11-02

## 2024-10-26 RX ORDER — ALBUTEROL SULFATE 0.83 MG/ML
2.5 SOLUTION RESPIRATORY (INHALATION) EVERY 6 HOURS PRN
Qty: 120 ML | Refills: 0 | Status: SHIPPED | OUTPATIENT
Start: 2024-10-26

## 2024-10-26 RX ORDER — PREDNISONE 20 MG/1
40 TABLET ORAL DAILY
Qty: 10 TABLET | Refills: 0 | Status: SHIPPED | OUTPATIENT
Start: 2024-10-26 | End: 2024-10-31

## 2024-10-26 NOTE — LETTER
October 26, 2024     Patient: Emmanuel Bey   YOB: 1970   Date of Visit: 10/26/2024       To Whom It May Concern:    It is my medical opinion that Emmanuel Bey may return to work on 10/29/24 if fever free for 24 hours and symptoms are improving.          Sincerely,        Reynold Tam Jr, CRISTO    CC: No Recipients

## 2024-10-26 NOTE — PATIENT INSTRUCTIONS
Take antibiotic as prescribed. Recommend probiotic use while taking antibiotic.   Avoid direct sunlight with use of doxycyline, reapply SPF 50 at least every 1-2 hours, wear long sleeves, and a wide brimmed hat.  Use albuterol as directed, as needed for shortness of breath and wheezing.  Take steroids as directed. Recommend to take them in the morning and with food.   Acetaminophen or ibuprofen for fever and pain.   Follow-up with PCP in 3-5 days. Go to ER if symptoms worsen.

## 2024-10-26 NOTE — LETTER
October 26, 2024     Patient: Emmanuel Bey   YOB: 1970   Date of Visit: 10/26/2024       To Whom It May Concern:    It is my medical opinion that Emmanuel Bey may return to work on 10/29/24 if fever free for 24 hours and symptoms are improving.          Sincerely,        BRANDYN Alvarado    CC: No Recipients

## 2024-10-26 NOTE — PROGRESS NOTES
[facility]        NAME: Emmanuel Bey is a 54 y.o. male  : 1970    MRN: [id number]  DATE: 2024  TIME: 1:15 PM      Assessment and Plan     Acute bacterial bronchitis [J20.8, B96.89]  1. Acute bacterial bronchitis  doxycycline (ADOXA) 100 MG tablet    predniSONE 20 mg tablet    albuterol (2.5 mg/3 mL) 0.083 % nebulizer solution            Patient Instructions   Take antibiotic as prescribed. Recommend probiotic use while taking antibiotic.   Avoid direct sunlight with use of doxycyline, reapply SPF 50 at least every 1-2 hours, wear long sleeves, and a wide brimmed hat.  Use albuterol as directed, as needed for shortness of breath and wheezing.  Take steroids as directed. Recommend to take them in the morning and with food.   Acetaminophen or ibuprofen for fever and pain.   Follow-up with PCP in 3-5 days. Go to ER if symptoms worsen.     Chief Complaint     Chief Complaint   Patient presents with    Cough     Patient reports cough for the past week. Reports intermittent low grade fever (Max: 105F). Reports chronic lung issues and allergies. Reports 88 on pulse Ox at home &  BPM. Reports nausea today. Reports less of a productive cough but thickness in mucus is increasing. Reports intermittent wheezing. Reports shortness of breath and some chest pressure. Reports taking cough suppressants, Tylenol and ibuprofen and Mucinex. Reports albuterol nebulizer with usual improvement but today it didn't help.          History of Present Illness     Patient 54-year-old male who presents with worsening cough over the past week.  Reports intermittent fevers.  Has been taking Tylenol over-the-counter.  Reports productive cough with wheezing and chest tightness.  Has been using nebulizer as directed.    Shortness of Breath  The current episode started in the past 7 days. The problem occurs constantly. The problem has been rapidly worsening since onset. Associated symptoms include orthopnea, a  sore throat and wheezing. Pertinent negatives include no chest pain, leg swelling or rhinorrhea. The symptoms are aggravated by URIs, any activity, pollens, weather changes and lying flat.       Review of Systems     Review of Systems   Constitutional:  Positive for fever.   HENT:  Positive for sore throat. Negative for ear pain and rhinorrhea.    Respiratory:  Positive for chest tightness, shortness of breath and wheezing.    Cardiovascular:  Positive for orthopnea. Negative for chest pain and leg swelling.   Gastrointestinal:  Positive for nausea. Negative for abdominal pain and vomiting.   Musculoskeletal:  Negative for neck pain.   Skin:  Negative for rash.   Neurological:  Positive for headaches.   All other systems reviewed and are negative.        Current Medications       Current Outpatient Medications:     acetaminophen (TYLENOL) 500 mg tablet, Take 2 tablets (1,000 mg total) by mouth every 8 (eight) hours, Disp: 60 tablet, Rfl: 0    albuterol (2.5 mg/3 mL) 0.083 % nebulizer solution, every 6 (six) hours as needed, Disp: , Rfl:     albuterol (2.5 mg/3 mL) 0.083 % nebulizer solution, Take 3 mL (2.5 mg total) by nebulization every 6 (six) hours as needed for wheezing or shortness of breath, Disp: 120 mL, Rfl: 0    diphenhydrAMINE (BENADRYL) 25 mg capsule, Take 25 mg by mouth every 6 (six) hours as needed, Disp: , Rfl:     doxycycline (ADOXA) 100 MG tablet, Take 1 tablet (100 mg total) by mouth 2 (two) times a day for 7 days, Disp: 14 tablet, Rfl: 0    Menthol (Halls Cough Drops) 5.8 MG LOZG, Apply to the mouth or throat if needed, Disp: , Rfl:     predniSONE 20 mg tablet, Take 2 tablets (40 mg total) by mouth daily for 5 days, Disp: 10 tablet, Rfl: 0    clindamycin (CLEOCIN) 300 MG capsule, TAKE 2 CAPSULES BY MOUTH 1 HOUR PRIOR TO DENTAL PROCEDURE/CLEANING (Patient not taking: Reported on 10/26/2024), Disp: , Rfl:     diclofenac (VOLTAREN) 75 mg EC tablet, Take 1 tablet (75 mg total) by mouth 2 (two) times a  "day, Disp: 60 tablet, Rfl: 0    LORazepam (ATIVAN) 1 mg tablet, Take 1 tablet (1 mg total) by mouth 30 min pre-procedure, Disp: 1 tablet, Rfl: 0    Multiple Vitamin (Daily Vites) tablet, Take 1 tablet by mouth daily (Patient not taking: Reported on 10/26/2024), Disp: , Rfl:     ondansetron (ZOFRAN-ODT) 4 mg disintegrating tablet, Take 1 tablet (4 mg total) by mouth every 6 (six) hours as needed for nausea or vomiting (Patient not taking: Reported on 2024), Disp: 20 tablet, Rfl: 0    Current Allergies     Allergies as of 10/26/2024 - Reviewed 10/26/2024   Allergen Reaction Noted    Pear - food allergy Anaphylaxis 2022    Penicillins Anaphylaxis and Hives 2009    Plum pulp - food allergy Anaphylaxis 2022    Fruit extracts Other (See Comments) 2024    Pollen extract Cough 2022              The following portions of the patient's history were reviewed and updated as appropriate: allergies, current medications, past family history, past medical history, past social history, past surgical history and problem list.     Past Medical History:   Diagnosis Date    Allergy-induced asthma     Arthritis     Asthma     seasonsl allergy induced    Colon polyp     PONV (postoperative nausea and vomiting)        Past Surgical History:   Procedure Laterality Date    ARTHROSCOPIC REPAIR ACL Left     L knee    COLONOSCOPY      KNEE SURGERY      acl replacement left knee    NASAL SEPTUM SURGERY      GA ARTHRP ACETBLR/PROX FEM PROSTC AGRFT/ALGRFT Left 2023    Procedure: ARTHROPLASTY HIP TOTAL- SAME DAY;  Surgeon: Kenneth Grayson DO;  Location: [facility];  Service: Orthopedics       Family History   Problem Relation Age of Onset    Cancer Father          of colon cancer at age 75    Diabetes Father          Medications have been verified.        Objective     /77   Pulse 97   Temp 98.6 °F (37 °C) (Tympanic)   Resp 18   Ht 5' 10\" (1.778 m)   Wt 133 kg (294 lb)   SpO2 95%   BMI " 42.18 kg/m²   No LMP for male patient.         Physical Exam     Physical Exam  Vitals and nursing note reviewed.   Constitutional:       General: He is not in acute distress.     Appearance: Normal appearance. He is not ill-appearing, toxic-appearing or diaphoretic.   Cardiovascular:      Rate and Rhythm: Normal rate.      Pulses: Normal pulses.      Heart sounds: Normal heart sounds, S1 normal and S2 normal.   Pulmonary:      Effort: Pulmonary effort is normal.      Breath sounds: Normal air entry. No stridor, decreased air movement or transmitted upper airway sounds. Examination of the right-upper field reveals wheezing. Examination of the left-upper field reveals wheezing. Wheezing (inspiratory) present. No decreased breath sounds, rhonchi or rales.   Skin:     General: Skin is warm.      Capillary Refill: Capillary refill takes less than 2 seconds.   Neurological:      General: No focal deficit present.      Mental Status: He is alert.   Psychiatric:         Mood and Affect: Mood normal.         Behavior: Behavior normal.         Thought Content: Thought content normal.         Judgment: Judgment normal.

## 2024-11-01 ENCOUNTER — EVALUATION (OUTPATIENT)
Dept: PHYSICAL THERAPY | Facility: MEDICAL CENTER | Age: 54
End: 2024-11-01
Payer: COMMERCIAL

## 2024-11-01 DIAGNOSIS — M25.571 CHRONIC PAIN OF RIGHT ANKLE: Primary | ICD-10-CM

## 2024-11-01 DIAGNOSIS — G89.29 CHRONIC PAIN OF RIGHT ANKLE: Primary | ICD-10-CM

## 2024-11-01 PROCEDURE — 97161 PT EVAL LOW COMPLEX 20 MIN: CPT | Performed by: PHYSICAL THERAPIST

## 2024-11-01 PROCEDURE — 97110 THERAPEUTIC EXERCISES: CPT | Performed by: PHYSICAL THERAPIST

## 2024-11-01 PROCEDURE — 97140 MANUAL THERAPY 1/> REGIONS: CPT | Performed by: PHYSICAL THERAPIST

## 2024-11-01 NOTE — PROGRESS NOTES
PT Evaluation     Today's date: 2024  Patient name: Emmanuel Bey  : 1970  MRN: [id number]  Referring provider: Jayme Murray DPM  Dx:   Encounter Diagnosis   Name Primary?    Chronic pain of right ankle                   Assessment  Impairments: abnormal muscle firing, abnormal or restricted ROM, abnormal movement, activity intolerance, impaired physical strength, lacks appropriate home exercise program, pain with function and weight-bearing intolerance    Assessment details: Emmanuel Bey is a 52 y/o male who presents with complaints of chronic right ankle pain.  The patient's greatest concern is not being able to perform functional activities without pain.  Primary movement impairment diagnosis of chronic ankle instability, resulting in pathoanatomical symptoms of chronic pain of right ankle, which limits his ability to perform functional activities without pain.  Pt. will benefit from skilled PT services that includes manual therapy techniques to enhance tissue extensibility, neuromuscular re-education to facilitate motor control, therapeutic exercise to increase functional mobility, and modalities prn to reduce pain and inflammation.   Understanding of Dx/Px/POC: good     Prognosis: good    Goals  Goals  Impairment Goals  - Pt I with initial HEP in 1-2 visits  - Improve ROM equal to contralateral side in 6 weeks  - Increase strength to at least 5/5 in all affected areas in 4-6 weeks     Functional Goals  - Increase Functional Status Measure to: goal status in 6-8 weeks  - Patient will be independent with comprehensive HEP in 6-8 weeks  - Ambulation is improved to prior level of function in 6-8 weeks  - Stair climbing is improved to prior level of function in 6-8 weeks  - Patient will be able to hike without pain in 6-8 weeks    Plan  Patient would benefit from: PT eval  Planned modality interventions: thermotherapy: hydrocollator packs, low level laser therapy and cryotherapy    Planned therapy  interventions: joint mobilization, manual therapy, neuromuscular re-education, patient education, strengthening, stretching, therapeutic activities, therapeutic exercise, graded exercise, graded activity, flexibility, abdominal trunk stabilization and balance/weight bearing training    Frequency: 1x week  Duration in weeks: 8  Treatment plan discussed with: patient      Subjective Evaluation    History of Present Illness  Mechanism of injury: Patient states that he has ongoing right ankle pain that has been chronic for at least a year and a half.  He does not recall a specific JOÃO.  When he had his left hip replaced, he was taking NSAIDs which helped to reduce the pain and pocket of swelling.  Patient has increased pain with standing and walking.  He notes heel and ankle pain first thing in the morning.  Patient has been following up c/ podiatry and was recently fitted for orthotics.  His goal is to be able to hike without pain.     Patient Goals  Patient goals for therapy: decreased pain, increased strength, independence with ADLs/IADLs, return to sport/leisure activities, increased motion, improved balance and decreased edema  Patient goal: Patient would like to be able to hike 5 miles.  Pain  Current pain ratin  At best pain ratin  At worst pain rating: 10  Quality: pressure  Relieving factors: rest (NSAIDs)  Aggravating factors: standing, walking and stair climbing      Diagnostic Tests  Abnormal x-ray: suggests lateral talar/distal fibular pseudoarthrosis.  Treatments  Current treatment: physical therapy      Objective     Observations     Additional Observation Details  Mild pocket of swelling R ATFL region.  No erythema, or ecchymosis.    Tenderness     Right Ankle/Foot   Tenderness in the anterior talofibular ligament. No tenderness in the fifth metatarsal base, lateral malleolus, medial malleolus and navicular.     Additional Tenderness Details  (+) TTP sinus tarsi    Neurological Testing      Sensation     Ankle/Foot   Left Ankle/Foot   Intact: light touch    Right Ankle/Foot   Intact: light touch     Passive Range of Motion   Left Hip   Normal passive range of motion    Right Hip   Normal passive range of motion    Additional Passive Range of Motion Details  Limited R ankle DF    Joint Play   Left Ankle/Foot  Joints within functional limits are the talocrural joint, subtalar joint, midfoot and forefoot.     Right Ankle/Foot  Hypermobile in the midfoot and forefoot.  Hypomobile in the talocrural joint and subtalar joint.     Strength/Myotome Testing     Left Ankle/Foot   Dorsiflexion: 5  Plantar flexion: 5  Inversion: 5  Eversion: 5    Right Ankle/Foot   Dorsiflexion: 5  Plantar flexion: 3-  Inversion: 3+  Eversion: 3+    Additional Strength Details  Hip strength testing to be assessed at a later date.    Tests     Right Hip   Positive long sit.     Right Ankle/Foot   Positive for navicular drop and windlass.   Negative for anterior drawer, eversion talar tilt, inversion talar tilt and syndesmosis squeeze.     Additional Tests Details  (+) Functional leg length discrepancy c/ R LE shorter    Functional Assessment        Single Leg Squat   Left Leg  Unable to perform.     Right Leg  Unable to perform .     Single Leg Stance   Left single leg stance time: unsteady.  Right single leg stance time: WNL.    Comments  Excessive R pronation c/ squatting    Posterior weight shift: moderate    Depth of femur height: above 90 degrees      Precautions: None     Manuals 11/1             TC mobs AZ                                              Neuro Re-Ed                Ankle 4 way 20x5s green                                              Ther Ex                              Ball roll outs HEP                             Seated arch lifts 20x              Towel scrunches 20x              Seated heel lifts 3x10              Seated Toe raises 3x10                                              Ther Activity                                                   Gait Training                                                 Modalities                                                   Nazanin Hyde, PT  11/1/2024,12:20 PM

## 2024-11-08 ENCOUNTER — OFFICE VISIT (OUTPATIENT)
Dept: PHYSICAL THERAPY | Facility: MEDICAL CENTER | Age: 54
End: 2024-11-08
Payer: COMMERCIAL

## 2024-11-08 DIAGNOSIS — G89.29 CHRONIC PAIN OF RIGHT ANKLE: Primary | ICD-10-CM

## 2024-11-08 DIAGNOSIS — M76.71 PERONEAL TENDINITIS OF RIGHT LOWER EXTREMITY: ICD-10-CM

## 2024-11-08 DIAGNOSIS — M79.671 RIGHT FOOT PAIN: ICD-10-CM

## 2024-11-08 DIAGNOSIS — M25.571 CHRONIC PAIN OF RIGHT ANKLE: Primary | ICD-10-CM

## 2024-11-08 PROCEDURE — 97110 THERAPEUTIC EXERCISES: CPT | Performed by: PHYSICAL THERAPIST

## 2024-11-08 PROCEDURE — 97140 MANUAL THERAPY 1/> REGIONS: CPT | Performed by: PHYSICAL THERAPIST

## 2024-11-08 NOTE — PROGRESS NOTES
Daily Note     Today's date: 2024  Patient name: Emmanuel Bey  : 1970  MRN: [id number]  Referring provider: Jayme Murray DPM  Dx:   Encounter Diagnosis     ICD-10-CM    1. Chronic pain of right ankle  M25.571     G89.29       2. Right foot pain  M79.671       3. Peroneal tendinitis of right lower extremity  M76.71                      Subjective: Patient states that he felt good after lv, but he was standing all week at work and his ankle was bothersome and swollen.      Objective: See treatment diary below.      Assessment:  Patient presents with minimal swelling today.  He demonstrates good tolerance to progressions without pain.  Tolerated treatment well. Patient would benefit from continued PT.      Plan: Continue per plan of care.      Precautions: WBAT    Manuals             TC mobs AZ AZ              Subtalar mobs  AZ              Manual isometrics  AZ 20x5s             Neuro Re-Ed                Ankle 4 way 20x5s green 30x5s blue                                             Ther Ex                              Ball roll outs HEP 2'                            Seated arch lifts 20x 20x             Towel scrunches 20x 20x             Seated heel lifts 3x10 3x10             Seated Toe raises 3x10 3x10             Standing heel raieses   2x10             Standing toe raises   2x10             Ther Activity                                                  Gait Training                                                 Modalities

## 2024-11-15 ENCOUNTER — TELEPHONE (OUTPATIENT)
Dept: ADMINISTRATIVE | Facility: OTHER | Age: 54
End: 2024-11-15

## 2024-11-15 ENCOUNTER — OFFICE VISIT (OUTPATIENT)
Dept: PHYSICAL THERAPY | Facility: MEDICAL CENTER | Age: 54
End: 2024-11-15
Payer: COMMERCIAL

## 2024-11-15 ENCOUNTER — OFFICE VISIT (OUTPATIENT)
Dept: FAMILY MEDICINE CLINIC | Facility: CLINIC | Age: 54
End: 2024-11-15
Payer: COMMERCIAL

## 2024-11-15 VITALS
TEMPERATURE: 97.8 F | HEART RATE: 88 BPM | BODY MASS INDEX: 41.37 KG/M2 | OXYGEN SATURATION: 97 % | DIASTOLIC BLOOD PRESSURE: 76 MMHG | HEIGHT: 70 IN | SYSTOLIC BLOOD PRESSURE: 124 MMHG | RESPIRATION RATE: 16 BRPM | WEIGHT: 289 LBS

## 2024-11-15 DIAGNOSIS — J45.21 MILD INTERMITTENT REACTIVE AIRWAY DISEASE WITH ACUTE EXACERBATION: Primary | ICD-10-CM

## 2024-11-15 DIAGNOSIS — M79.671 RIGHT FOOT PAIN: ICD-10-CM

## 2024-11-15 DIAGNOSIS — M76.71 PERONEAL TENDINITIS OF RIGHT LOWER EXTREMITY: ICD-10-CM

## 2024-11-15 DIAGNOSIS — E66.01 SEVERE OBESITY (BMI >= 40) (HCC): ICD-10-CM

## 2024-11-15 DIAGNOSIS — R05.2 SUBACUTE COUGH: ICD-10-CM

## 2024-11-15 DIAGNOSIS — M16.12 PRIMARY OSTEOARTHRITIS OF LEFT HIP: ICD-10-CM

## 2024-11-15 DIAGNOSIS — G89.29 CHRONIC PAIN OF RIGHT ANKLE: Primary | ICD-10-CM

## 2024-11-15 DIAGNOSIS — M25.571 CHRONIC PAIN OF RIGHT ANKLE: Primary | ICD-10-CM

## 2024-11-15 DIAGNOSIS — Z12.5 PROSTATE CANCER SCREENING: ICD-10-CM

## 2024-11-15 PROCEDURE — 99204 OFFICE O/P NEW MOD 45 MIN: CPT | Performed by: FAMILY MEDICINE

## 2024-11-15 PROCEDURE — 97140 MANUAL THERAPY 1/> REGIONS: CPT | Performed by: PHYSICAL THERAPIST

## 2024-11-15 PROCEDURE — 97110 THERAPEUTIC EXERCISES: CPT | Performed by: PHYSICAL THERAPIST

## 2024-11-15 RX ORDER — MOMETASONE FUROATE 110 UG/1
2 INHALANT RESPIRATORY (INHALATION) EVERY EVENING
Qty: 1 EACH | Refills: 2 | Status: SHIPPED | OUTPATIENT
Start: 2024-11-15

## 2024-11-15 RX ORDER — MONTELUKAST SODIUM 10 MG/1
10 TABLET ORAL
Qty: 30 TABLET | Refills: 1 | Status: SHIPPED | OUTPATIENT
Start: 2024-11-15

## 2024-11-15 NOTE — TELEPHONE ENCOUNTER
----- Message from Ruddy LAST sent at 11/15/2024  9:12 AM EST -----  11/15/24 9:14 AM    Hello, our patient Emmanuel Bey has had CRC: Colonoscopy completed/performed. Please assist in updating the patient chart by pulling a previous Electronic Medical Record (EMR) document. The previous EMR is Unknown. The date of service is Unknown.    Thank you,  Ruddy Hammonds MA  [facility]

## 2024-11-15 NOTE — PROGRESS NOTES
Answers submitted by the patient for this visit:  Cough Questionnaire (Submitted on 2024)  Chief Complaint: Cough  Chronicity: recurrent  Onset: 1 to 4 weeks ago  Progression since onset: waxing and waning  Frequency: every few minutes  Cough characteristics: productive of sputum  chest pain: No  chills: No  ear congestion: No  ear pain: No  fever: No  headaches: No  heartburn: No  hemoptysis: No  myalgias: No  nasal congestion: Yes  postnasal drip: Yes  rash: No  rhinorrhea: No  shortness of breath: Yes  sore throat: Yes  sweats: No  weight loss: No  wheezing: Yes  Aggravated by: cold air, exercise, lying down    Name: Emmanuel Bey      : 1970      MRN: [id number]  Encounter Provider: Jacobo Payne MD  Encounter Date: 11/15/2024   Encounter department: [facility]  :  Assessment & Plan  Mild intermittent reactive airway disease with acute exacerbation  Likely post viral cough triggering reactive airway/ashtma, start asmanex, singuilair for allergies antihistamines not effective, treated with antibioitcs at urgent care with no change in symptoms.  Check xray if symptoms dod not resolve.  Orders:    mometasone (Asmanex, 30 Metered Doses,) 110 mcg/actuation AEPB inhaler; Inhale 2 puffs every evening Rinse mouth after use.    montelukast (SINGULAIR) 10 mg tablet; Take 1 tablet (10 mg total) by mouth daily at bedtime    Subacute cough    Likely post viral cough triggering reactive airway/ashtma, start asmanex, singuilair for allergies antihistamines not effective, treated with antibioitcs at urgent care with no change in symptoms.  Check xray if symptoms dod not resolve.  Orders:    mometasone (Asmanex, 30 Metered Doses,) 110 mcg/actuation AEPB inhaler; Inhale 2 puffs every evening Rinse mouth after use.    montelukast (SINGULAIR) 10 mg tablet; Take 1 tablet (10 mg total) by mouth daily at bedtime    Severe obesity (BMI >= 40) (HCC)    Check routine labs, increase  exercise as ankle recovers      Orders:    CBC and differential; Future    Comprehensive metabolic panel; Future    Lipid panel; Future    Prostate cancer screening  Screening ordered.  Orders:    PSA, Total Screen; Future    Primary osteoarthritis of left hip    S/P surgery now doing well             Depression Screening and Follow-up Plan: Patient was screened for depression during today's encounter. They screened negative with a PHQ-2 score of 0.    The natural history of prostate cancer and ongoing controversy regarding screening and potential treatment outcomes of prostate cancer has been discussed with the patient. The meaning of a false positive PSA and a false negative PSA has been discussed. He indicates understanding of the limitations of this screening test and wishes to proceed with screening PSA testing.    History of Present Illness     Cough  This is a recurrent problem. The current episode started 1 to 4 weeks ago. The problem has been waxing and waning. The problem occurs every few minutes. The cough is Productive of sputum. Associated symptoms include nasal congestion, postnasal drip, a sore throat, shortness of breath and wheezing. Pertinent negatives include no chest pain, chills, ear congestion, ear pain, fever, headaches, heartburn, hemoptysis, myalgias, rash, rhinorrhea, sweats or weight loss. The symptoms are aggravated by cold air, exercise and lying down.     Review of Systems   Constitutional:  Negative for chills, fever and weight loss.   HENT:  Positive for postnasal drip and sore throat. Negative for ear pain and rhinorrhea.    Respiratory:  Positive for cough, shortness of breath and wheezing. Negative for hemoptysis.    Cardiovascular:  Negative for chest pain.   Gastrointestinal:  Negative for heartburn.   Musculoskeletal:  Negative for myalgias.   Skin:  Negative for rash.   Neurological:  Negative for headaches.          Objective   /76 (BP Location: Left arm, Patient  "Position: Sitting, Cuff Size: Large)   Pulse 88   Temp 97.8 °F (36.6 °C) (Temporal)   Resp 16   Ht 5' 10\" (1.778 m)   Wt 131 kg (289 lb)   SpO2 97%   BMI 41.47 kg/m²      Physical Exam  Constitutional:       Appearance: Normal appearance.   Cardiovascular:      Rate and Rhythm: Normal rate and regular rhythm.   Pulmonary:      Effort: Pulmonary effort is normal.      Breath sounds: Normal breath sounds.   Abdominal:      General: Abdomen is flat.      Tenderness: There is no abdominal tenderness.   Musculoskeletal:         General: Normal range of motion.   Neurological:      Mental Status: He is alert.         "

## 2024-11-15 NOTE — ASSESSMENT & PLAN NOTE
Check routine labs, increase exercise as ankle recovers      Orders:    CBC and differential; Future    Comprehensive metabolic panel; Future    Lipid panel; Future    
  S/P surgery now doing well       
Likely post viral cough triggering reactive airway/ashtma, start asmanex, singuilair for allergies antihistamines not effective, treated with antibioitcs at urgent care with no change in symptoms.  Check xray if symptoms dod not resolve.  Orders:    mometasone (Asmanex, 30 Metered Doses,) 110 mcg/actuation AEPB inhaler; Inhale 2 puffs every evening Rinse mouth after use.    montelukast (SINGULAIR) 10 mg tablet; Take 1 tablet (10 mg total) by mouth daily at bedtime    
Yes

## 2024-11-15 NOTE — TELEPHONE ENCOUNTER
----- Message from Ruddy LAST sent at 11/15/2024  9:44 AM EST -----  11/15/24 9:45 AM    Hello, our patient Emmanuel Bey has had CRC: Colonoscopy completed/performed. Please assist in updating the patient chart by pulling the document from the Media Tab. The date of service is Unknown..     Thank you,  Ruddy Hammonds MA  [facility]

## 2024-11-15 NOTE — PROGRESS NOTES
Daily Note     Today's date: 11/15/2024  Patient name: Emmanuel Bey  : 1970  MRN: [id number]  Referring provider: Jayme Murray DPM  Dx:   Encounter Diagnosis     ICD-10-CM    1. Chronic pain of right ankle  M25.571     G89.29       2. Right foot pain  M79.671       3. Peroneal tendinitis of right lower extremity  M76.71                      Subjective: Patient states that he is doing well.      Objective: See treatment diary below.      Assessment:  Patient presents c/ decreased pain and swelling.  He demonstrates good tolerance to progressions.  SL instability noted c/ balance exercise.  Tolerated treatment well. Patient would benefit from continued PT.      Plan: Continue per plan of care.      Precautions: WBAT    Manuals 11/1 11/8 11/15           TC mobs AZ AZ AZ             Subtalar mobs  AZ AZ             Manual isometrics  AZ 20x5s             Neuro Re-Ed                Ankle 4 way 20x5s green 30x5s blue 30x5s blue                                            Ther Ex                              Ball roll outs HEP 2' 2'                           Seated arch lifts 20x 20x 20x            Towel scrunches 20x 20x 20x            Seated heel lifts 3x10 3x10 D/c            Seated Toe raises 3x10 3x10 D/c             Standing heel raieses   2x10 3x12            Standing toe raises   2x10 3x12            Wall SL calf raises   3x5          Midfoot banded  arch lifts   3x10 yellow          Banded side steps   3x3 yellow          SL balance   10x to failure floor           Ther Activity                                                  Gait Training                                                 Modalities

## 2024-11-18 NOTE — TELEPHONE ENCOUNTER
Please let patient know.  The biopsies all came out fine.      Rec remains the same.  Take meds as directed. Upon review of the In Basket request we were able to locate, review, and update the patient chart as requested for CRC: Colonoscopy.    Any additional questions or concerns should be emailed to the Practice Liaisons via the appropriate education email address, please do not reply via In Basket.    Thank you  Shiloh Mo [facility]

## 2024-11-22 ENCOUNTER — OFFICE VISIT (OUTPATIENT)
Dept: PHYSICAL THERAPY | Facility: MEDICAL CENTER | Age: 54
End: 2024-11-22
Payer: COMMERCIAL

## 2024-11-22 DIAGNOSIS — M76.71 PERONEAL TENDINITIS OF RIGHT LOWER EXTREMITY: ICD-10-CM

## 2024-11-22 DIAGNOSIS — G89.29 CHRONIC PAIN OF RIGHT ANKLE: Primary | ICD-10-CM

## 2024-11-22 DIAGNOSIS — M25.571 CHRONIC PAIN OF RIGHT ANKLE: Primary | ICD-10-CM

## 2024-11-22 DIAGNOSIS — M79.671 RIGHT FOOT PAIN: ICD-10-CM

## 2024-11-22 PROCEDURE — 97140 MANUAL THERAPY 1/> REGIONS: CPT | Performed by: PHYSICAL THERAPIST

## 2024-11-22 PROCEDURE — 97110 THERAPEUTIC EXERCISES: CPT | Performed by: PHYSICAL THERAPIST

## 2024-11-22 NOTE — PROGRESS NOTES
Daily Note     Today's date: 2024  Patient name: Emmanuel Bey  : 1970  MRN: [id number]  Referring provider: Jayme Murray DPM  Dx:   Encounter Diagnosis     ICD-10-CM    1. Chronic pain of right ankle  M25.571     G89.29       2. Right foot pain  M79.671       3. Peroneal tendinitis of right lower extremity  M76.71                      Subjective: Patient states that he is doing well.      Objective: See treatment diary below.      Assessment:  Patient demonstrates good tolerance to progressions without ankle pain.  Tolerated treatment well. Patient would benefit from continued PT.      Plan: Continue per plan of care.      Precautions: WBAT    Manuals 11/1 11/8 11/15 11/22          TC mobs AZ AZ AZ AZ            Subtalar mobs  AZ AZ AZ            Manual isometrics  AZ 20x5s             Neuro Re-Ed                Ankle 4 way 20x5s green 30x5s blue 30x5s blue 30x5s blue                                           Ther Ex                              Ball roll outs HEP 2' 2' 2'                          Seated arch lifts 20x 20x 20x 30x           Towel scrunches 20x 20x 20x 30x           Seated heel lifts 3x10 3x10 D/c            Seated Toe raises 3x10 3x10 D/c             Standing heel raieses   2x10 3x12 3x15           Standing toe raises   2x10 3x12 3x15           Wall SL calf raises   3x5 4x5         Midfoot banded  arch lifts   3x10 yellow 3x10 yellow         Banded side steps   3x3 yellow 3x5 yellow         SL balance   10x to failure floor  10x to failure floor         LP    3x10 SL 70#         Ther Activity                                                  Gait Training                                                 Modalities

## 2024-12-03 ENCOUNTER — OFFICE VISIT (OUTPATIENT)
Dept: PHYSICAL THERAPY | Facility: OTHER | Age: 54
End: 2024-12-03
Payer: COMMERCIAL

## 2024-12-03 DIAGNOSIS — M76.71 PERONEAL TENDINITIS OF RIGHT LOWER EXTREMITY: ICD-10-CM

## 2024-12-03 DIAGNOSIS — M25.571 CHRONIC PAIN OF RIGHT ANKLE: Primary | ICD-10-CM

## 2024-12-03 DIAGNOSIS — M79.671 RIGHT FOOT PAIN: ICD-10-CM

## 2024-12-03 DIAGNOSIS — G89.29 CHRONIC PAIN OF RIGHT ANKLE: Primary | ICD-10-CM

## 2024-12-03 NOTE — PROGRESS NOTES
Custom orthotics fitted to patients shoe and dispensed. Patient educated on appropriate break in period. Patient will follow up if any future problems arise. Beveled left arch. Patient reporting comfort after modification. Discussed that if further modification is needed the device will most likely need to be mailed back to the lab.

## 2024-12-04 PROCEDURE — L3010 FOOT LONGITUDINAL ARCH SUPPO: HCPCS | Performed by: PHYSICAL THERAPIST

## 2024-12-06 ENCOUNTER — APPOINTMENT (OUTPATIENT)
Dept: LAB | Facility: HOSPITAL | Age: 54
End: 2024-12-06
Attending: UROLOGY
Payer: COMMERCIAL

## 2024-12-06 DIAGNOSIS — Z30.2 ENCOUNTER FOR STERILIZATION: ICD-10-CM

## 2024-12-06 LAB
DEPRECATED CD4 CELLS/CD8 CELLS BLD: 2 ML
SPERM MOTILE SMN QL MICRO: NORMAL

## 2024-12-06 PROCEDURE — 89321 SEMEN ANAL SPERM DETECTION: CPT

## 2024-12-09 ENCOUNTER — RESULTS FOLLOW-UP (OUTPATIENT)
Dept: UROLOGY | Facility: CLINIC | Age: 54
End: 2024-12-09

## 2024-12-09 NOTE — TELEPHONE ENCOUNTER
Left message for pt.           ----- Message from Shailesh Conde MD sent at 12/9/2024  7:04 AM EST -----  Ok for intercourse without contraception.    Please call patient.    Thx    FT  ----- Message -----  From: Lab, Background User  Sent: 12/6/2024   1:42 PM EST  To: Shailesh Conde MD

## 2024-12-13 ENCOUNTER — OFFICE VISIT (OUTPATIENT)
Dept: PHYSICAL THERAPY | Facility: MEDICAL CENTER | Age: 54
End: 2024-12-13
Payer: COMMERCIAL

## 2024-12-13 DIAGNOSIS — M25.571 CHRONIC PAIN OF RIGHT ANKLE: Primary | ICD-10-CM

## 2024-12-13 DIAGNOSIS — M76.71 PERONEAL TENDINITIS OF RIGHT LOWER EXTREMITY: ICD-10-CM

## 2024-12-13 DIAGNOSIS — M79.671 RIGHT FOOT PAIN: ICD-10-CM

## 2024-12-13 DIAGNOSIS — G89.29 CHRONIC PAIN OF RIGHT ANKLE: Primary | ICD-10-CM

## 2024-12-13 PROCEDURE — 97140 MANUAL THERAPY 1/> REGIONS: CPT | Performed by: PHYSICAL THERAPIST

## 2024-12-13 PROCEDURE — 97110 THERAPEUTIC EXERCISES: CPT | Performed by: PHYSICAL THERAPIST

## 2024-12-13 NOTE — PROGRESS NOTES
Daily Note     Today's date: 2024  Patient name: Emmanuel Bey  : 1970  MRN: [id number]  Referring provider: Jayme Murray DPM  Dx:   Encounter Diagnosis     ICD-10-CM    1. Chronic pain of right ankle  M25.571     G89.29       2. Right foot pain  M79.671       3. Peroneal tendinitis of right lower extremity  M76.71                      Subjective: Patient states that he has been on his feet at work a lot, so the ankle is sore, but he feels it is getting stronger.      Objective: See treatment diary below.      Assessment:  Patient presents c/ new orthotics and new sneakers.  He is wearing them intermittently throughout the day as instructed.  He demonstrates soreness from being on his feet this week and working extended hours.  Patient demonstrates good tolerance to strengthening progressions.  Able to tolerate walking on the treadmill at incline 5 for 5 minutes.  Pt also demonstrates increased single leg stability.  Tolerated treatment well. Patient would benefit from continued PT.      Plan: Continue per plan of care.      Precautions: WBAT    Manuals 11/1 11/8 11/15 11/22 12/13         TC mobs AZ AZ AZ AZ AZ           Subtalar mobs  AZ AZ AZ AZ           Manual isometrics  AZ 20x5s             Neuro Re-Ed                Ankle 4 way 20x5s green 30x5s blue 30x5s blue 30x5s blue 30x5s black                                          Ther Ex               Bike     10'          Ball roll outs HEP 2' 2' 2' 2'          1/2 kneeling DF mobility     20x2s          Seated arch lifts 20x 20x 20x 30x 30x          Towel scrunches 20x 20x 20x 30x 30x          Seated heel lifts 3x10 3x10 D/c            Seated Toe raises 3x10 3x10 D/c             Standing heel raieses   2x10 3x12 3x15 3x15          Standing toe raises   2x10 3x12 3x15 3x15          Standing heel raises KF     3x15        Wall SL calf raises   3x5 4x5 4x5        Midfoot banded  arch lifts   3x10 yellow 3x10 yellow 3x10 green        Banded side  steps   3x3 yellow 3x5 yellow 3x5 green        SL balance   10x to failure floor  10x to failure floor 10x to failure airex        LP    3x10 SL 70# 3x10 SL 70#        TM walking     5' incline 5%        Ther Activity                                                  Gait Training                                                 Modalities

## 2024-12-16 ENCOUNTER — TELEPHONE (OUTPATIENT)
Dept: FAMILY MEDICINE CLINIC | Facility: CLINIC | Age: 54
End: 2024-12-16

## 2024-12-16 ENCOUNTER — OFFICE VISIT (OUTPATIENT)
Dept: FAMILY MEDICINE CLINIC | Facility: CLINIC | Age: 54
End: 2024-12-16
Payer: COMMERCIAL

## 2024-12-16 VITALS
SYSTOLIC BLOOD PRESSURE: 122 MMHG | HEART RATE: 89 BPM | DIASTOLIC BLOOD PRESSURE: 78 MMHG | BODY MASS INDEX: 42.35 KG/M2 | TEMPERATURE: 98 F | HEIGHT: 70 IN | OXYGEN SATURATION: 99 % | RESPIRATION RATE: 20 BRPM | WEIGHT: 295.8 LBS

## 2024-12-16 DIAGNOSIS — H60.332 ACUTE SWIMMER'S EAR OF LEFT SIDE: Primary | ICD-10-CM

## 2024-12-16 PROCEDURE — 99213 OFFICE O/P EST LOW 20 MIN: CPT | Performed by: FAMILY MEDICINE

## 2024-12-16 RX ORDER — CIPROFLOXACIN AND DEXAMETHASONE 3; 1 MG/ML; MG/ML
4 SUSPENSION/ DROPS AURICULAR (OTIC) 2 TIMES DAILY
Qty: 7.5 ML | Refills: 0 | Status: SHIPPED | OUTPATIENT
Start: 2024-12-16

## 2024-12-16 NOTE — PROGRESS NOTES
"Name: Emmanuel Bey      : 1970      MRN: [id number]  Encounter Provider: Jacobo Payne MD  Encounter Date: 2024   Encounter department: [facility] PRIMARY [facility]  :  Assessment & Plan  Acute swimmer's ear of left side    Start ciprodex, if pain/hearing not improving refer to ENT, for further treatment/cerumen removal.    Orders:    ciprofloxacin-dexamethasone (CIPRODEX) otic suspension; Administer 4 drops into the left ear 2 (two) times a day           History of Present Illness     Earache   There is pain in the left ear. This is a new problem. The current episode started in the past 7 days. The problem occurs constantly. The problem has been unchanged. There has been no fever. The pain is moderate. Associated symptoms include hearing loss. Pertinent negatives include no abdominal pain, coughing, neck pain, rhinorrhea, sore throat or vomiting. He has tried nothing for the symptoms. There is no history of a chronic ear infection, hearing loss or a tympanostomy tube.     Review of Systems   HENT:  Positive for ear pain and hearing loss. Negative for rhinorrhea and sore throat.    Respiratory:  Negative for cough.    Gastrointestinal:  Negative for abdominal pain and vomiting.   Genitourinary:  Negative for difficulty urinating and dysuria.   Musculoskeletal:  Negative for neck pain.   Psychiatric/Behavioral:  Negative for agitation and behavioral problems.        Objective   /78 (BP Location: Left arm, Patient Position: Sitting, Cuff Size: Large)   Pulse 89   Temp 98 °F (36.7 °C) (Tympanic)   Resp 20   Ht 5' 10\" (1.778 m)   Wt 134 kg (295 lb 12.8 oz)   SpO2 99%   BMI 42.44 kg/m²      Physical Exam  Constitutional:       Appearance: Normal appearance.   HENT:      Right Ear: Hearing and tympanic membrane normal.      Left Ear: Drainage present.      Ears:      Comments: Left ear canal edemetous erythemetous, noted dried blood with small external cut, ear wax " obscurring full TM, TM seen WNL  Cardiovascular:      Rate and Rhythm: Normal rate and regular rhythm.      Pulses: Normal pulses.   Abdominal:      General: Abdomen is flat.   Neurological:      Mental Status: He is alert.

## 2024-12-16 NOTE — TELEPHONE ENCOUNTER
Pt returned Gabriela's call. Pt confirmed that he would like to cancel tomorrow's appt with Dr. Steward and keep his appt for today with his PCP.    Tomorrow's appt cancelled. Today's appt confirmed.

## 2024-12-16 NOTE — TELEPHONE ENCOUNTER
Called patient left message as to which appointment he would be coming to since he made one for today as well as  tomorrow, ask him to call the office back and please let us know.

## 2024-12-16 NOTE — TELEPHONE ENCOUNTER
12/16 9:05am: ARMIDA to see if PT wanted to come in today for his L ear infection instead of tomorrow.

## 2024-12-20 ENCOUNTER — OFFICE VISIT (OUTPATIENT)
Dept: PHYSICAL THERAPY | Facility: MEDICAL CENTER | Age: 54
End: 2024-12-20
Payer: COMMERCIAL

## 2024-12-20 DIAGNOSIS — M76.71 PERONEAL TENDINITIS OF RIGHT LOWER EXTREMITY: ICD-10-CM

## 2024-12-20 DIAGNOSIS — M25.571 CHRONIC PAIN OF RIGHT ANKLE: Primary | ICD-10-CM

## 2024-12-20 DIAGNOSIS — G89.29 CHRONIC PAIN OF RIGHT ANKLE: Primary | ICD-10-CM

## 2024-12-20 DIAGNOSIS — M79.671 RIGHT FOOT PAIN: ICD-10-CM

## 2024-12-20 PROCEDURE — 97140 MANUAL THERAPY 1/> REGIONS: CPT | Performed by: PHYSICAL THERAPIST

## 2024-12-20 PROCEDURE — 97110 THERAPEUTIC EXERCISES: CPT | Performed by: PHYSICAL THERAPIST

## 2024-12-20 NOTE — PROGRESS NOTES
Daily Note     Today's date: 2024  Patient name: Emmanuel Bey  : 1970  MRN: [id number]  Referring provider: Jayme Murray DPM  Dx:   Encounter Diagnosis     ICD-10-CM    1. Chronic pain of right ankle  M25.571     G89.29       2. Right foot pain  M79.671       3. Peroneal tendinitis of right lower extremity  M76.71                      Subjective: Patient states that he continues to feel improvement.       Objective: See treatment diary below.      Assessment: Tolerated treatment well. Patient would benefit from continued PT      Plan: Continue per plan of care.      Precautions: WBAT    Manuals 11/1 11/8 11/15 11/22 12/13 12/20        TC mobs AZ AZ AZ AZ AZ AZ          Subtalar mobs  AZ AZ AZ AZ AZ          Manual isometrics  AZ 20x5s             Neuro Re-Ed                Ankle 4 way 20x5s green 30x5s blue 30x5s blue 30x5s blue 30x5s black 30x5s black                                         Ther Ex               Bike     10' 10'         Ball roll outs HEP 2' 2' 2' 2' 2'         1/2 kneeling DF mobility     20x2s 20x2s         Seated arch lifts 20x 20x 20x 30x 30x 30x         Towel scrunches 20x 20x 20x 30x 30x 30x         Seated heel lifts 3x10 3x10 D/c            Seated Toe raises 3x10 3x10 D/c             Standing heel raieses   2x10 3x12 3x15 3x15 3x15         Standing toe raises   2x10 3x12 3x15 3x15 3x15         Standing heel raises KF     3x15 3x15       Wall SL calf raises   3x5 4x5 4x5 4x5       Midfoot banded  arch lifts   3x10 yellow 3x10 yellow 3x10 green 3x10 green       Banded side steps   3x3 yellow 3x5 yellow 3x5 green 3x5 green       SL balance   10x to failure floor  10x to failure floor 10x to failure airex 10x to failure airex       LP    3x10 SL 70# 3x10 SL 70# 3x10 SL 70#       TM walking     5' incline 5% 10' incline 5%       Ther Activity                                                  Gait Training                                                 Modalities

## 2024-12-27 ENCOUNTER — OFFICE VISIT (OUTPATIENT)
Dept: PHYSICAL THERAPY | Facility: MEDICAL CENTER | Age: 54
End: 2024-12-27
Payer: COMMERCIAL

## 2024-12-27 DIAGNOSIS — M76.71 PERONEAL TENDINITIS OF RIGHT LOWER EXTREMITY: ICD-10-CM

## 2024-12-27 DIAGNOSIS — G89.29 CHRONIC PAIN OF RIGHT ANKLE: Primary | ICD-10-CM

## 2024-12-27 DIAGNOSIS — M79.671 RIGHT FOOT PAIN: ICD-10-CM

## 2024-12-27 DIAGNOSIS — M25.571 CHRONIC PAIN OF RIGHT ANKLE: Primary | ICD-10-CM

## 2024-12-27 PROCEDURE — 97140 MANUAL THERAPY 1/> REGIONS: CPT | Performed by: PHYSICAL THERAPIST

## 2024-12-27 PROCEDURE — 97110 THERAPEUTIC EXERCISES: CPT | Performed by: PHYSICAL THERAPIST

## 2024-12-27 NOTE — PROGRESS NOTES
Daily Note     Today's date: 2024  Patient name: Emmanuel Bey  : 1970  MRN: [id number]  Referring provider: Jayme Murray DPM  Dx:   Encounter Diagnosis     ICD-10-CM    1. Chronic pain of right ankle  M25.571     G89.29       2. Right foot pain  M79.671       3. Peroneal tendinitis of right lower extremity  M76.71                      Subjective: Patient states that he is doing well.       Objective: See treatment diary below.      Assessment: Tolerated treatment well. Patient would benefit from continued PT.      Plan: Continue per plan of care.      Precautions: WBAT    Manuals 11/1 11/8 11/15 11/22 12/13 12/20 12/27       TC mobs AZ AZ AZ AZ AZ AZ AZ         Subtalar mobs  AZ AZ AZ AZ AZ AZ         Manual isometrics  AZ 20x5s             Neuro Re-Ed                Ankle 4 way 20x5s green 30x5s blue 30x5s blue 30x5s blue 30x5s black 30x5s black 30x5s black                                        Ther Ex               Bike     10' 10' 10'        Ball roll outs HEP 2' 2' 2' 2' 2' 2'        1/2 kneeling DF mobility     20x2s 20x2s 20x2s        Seated arch lifts 20x 20x 20x 30x 30x 30x 30x        Towel scrunches 20x 20x 20x 30x 30x 30x 30x        Seated heel lifts 3x10 3x10 D/c            Seated Toe raises 3x10 3x10 D/c             Standing heel raieses   2x10 3x12 3x15 3x15 3x15 3x15        Standing toe raises   2x10 3x12 3x15 3x15 3x15 3x15        Standing heel raises KF     3x15 3x15 3x15      Wall SL calf raises   3x5 4x5 4x5 4x5 4x5      Midfoot banded  arch lifts   3x10 yellow 3x10 yellow 3x10 green 3x10 green 3x10 green      Banded side steps   3x3 yellow 3x5 yellow 3x5 green 3x5 green 3x5 green      SL balance   10x to failure floor  10x to failure floor 10x to failure airex 10x to failure airex 10x to failure airex      LP    3x10 SL 70# 3x10 SL 70# 3x10 SL 70# 3x10 SL 70#      TM walking     5' incline 5% 10' incline 5% 10' incline 5%      Ther Activity                                                   Gait Training                                                 Modalities

## 2025-01-03 ENCOUNTER — EVALUATION (OUTPATIENT)
Dept: PHYSICAL THERAPY | Facility: MEDICAL CENTER | Age: 55
End: 2025-01-03
Payer: COMMERCIAL

## 2025-01-03 DIAGNOSIS — M79.671 RIGHT FOOT PAIN: ICD-10-CM

## 2025-01-03 DIAGNOSIS — G89.29 CHRONIC PAIN OF RIGHT ANKLE: Primary | ICD-10-CM

## 2025-01-03 DIAGNOSIS — M25.571 CHRONIC PAIN OF RIGHT ANKLE: Primary | ICD-10-CM

## 2025-01-03 DIAGNOSIS — M76.71 PERONEAL TENDINITIS OF RIGHT LOWER EXTREMITY: ICD-10-CM

## 2025-01-03 PROCEDURE — 97164 PT RE-EVAL EST PLAN CARE: CPT | Performed by: PHYSICAL THERAPIST

## 2025-01-03 PROCEDURE — 97110 THERAPEUTIC EXERCISES: CPT | Performed by: PHYSICAL THERAPIST

## 2025-01-03 PROCEDURE — 97140 MANUAL THERAPY 1/> REGIONS: CPT | Performed by: PHYSICAL THERAPIST

## 2025-01-03 NOTE — LETTER
January 3, 2025    Jayme Murray DPM  [address] [place] [id number]    Patient: Emmanuel Bey   YOB: 1970   Date of Visit: 1/3/2025     Encounter Diagnosis     ICD-10-CM    1. Chronic pain of right ankle  M25.571     G89.29       2. Right foot pain  M79.671       3. Peroneal tendinitis of right lower extremity  M76.71           Dear Dr. Murray:    Thank you for your recent referral of Emmanuel Bey. Please review the attached evaluation summary from Emmanuel's recent visit.     Please verify that you agree with the plan of care by signing the attached order.     If you have any questions or concerns, please do not hesitate to call.     I sincerely appreciate the opportunity to share in the care of one of your patients and hope to have another opportunity to work with you in the near future.       Sincerely,    Nazanin Hyde, PT      Referring Provider:      I certify that I have read the below Plan of Care and certify the need for these services furnished under this plan of treatment while under my care.                    Jayme Murray DPM  [address] [place] [id number]  Via Fax: [phone]          PT Evaluation     Today's date: 1/3/2025  Patient name: Emmanuel Bey  : 1970  MRN: [id number]  Referring provider: Jayme Murray DPM  Dx:   Encounter Diagnoses   Name Primary?   • Chronic pain of right ankle Yes   • Right foot pain    • Peroneal tendinitis of right lower extremity                     Assessment  Impairments: abnormal muscle firing, abnormal or restricted ROM, abnormal movement, activity intolerance, impaired physical strength, lacks appropriate home exercise program, pain with function and weight-bearing intolerance    Assessment details: Emmanuel Bey is a 52 y/o male who presents with complaints of chronic right ankle pain.  The patient's greatest concern is not being able to perform functional activities without pain.  Primary movement impairment diagnosis  of chronic ankle instability, resulting in pathoanatomical symptoms of chronic pain of right ankle, which limits his ability to perform functional activities without pain.  Pt. will benefit from skilled PT services that includes manual therapy techniques to enhance tissue extensibility, neuromuscular re-education to facilitate motor control, therapeutic exercise to increase functional mobility, and modalities prn to reduce pain and inflammation.   Understanding of Dx/Px/POC: good     Prognosis: good    Goals  Goals  Impairment Goals  - Pt I with initial HEP in 1-2 visits- achieved  - Improve ROM equal to contralateral side in 6 weeks- in progress  - Increase strength to at least 5/5 in all affected areas in 4-6 weeks- in progress     Functional Goals  - Increase Functional Status Measure to: goal status in 6-8 weeks- in progress  - Patient will be independent with comprehensive HEP in 6-8 weeks- in progress  - Ambulation is improved to prior level of function in 6-8 weeks- in progress  - Stair climbing is improved to prior level of function in 6-8 weeks- in progress  - Patient will be able to hike without pain in 6-8 weeks- in progress    Plan  Patient would benefit from: PT eval  Planned modality interventions: thermotherapy: hydrocollator packs, low level laser therapy and cryotherapy  Other planned modality interventions: EPAT    Planned therapy interventions: joint mobilization, manual therapy, neuromuscular re-education, patient education, strengthening, stretching, therapeutic activities, therapeutic exercise, graded exercise, graded activity, flexibility, abdominal trunk stabilization and balance/weight bearing training    Frequency: 1x week  Therapy duration (weeks): 6-8 weeks.  Treatment plan discussed with: patient      Subjective Evaluation    History of Present Illness  Mechanism of injury: Patient states that he feels like his ankle is finally improving.  He has been compliant c/ wearing orthotics.  He  has not increased walking a lot yet on his own.  His goal is to walk, hike, and increase his physical activity on a regular basis.      Patient Goals  Patient goals for therapy: decreased pain, increased strength, independence with ADLs/IADLs, return to sport/leisure activities, increased motion, improved balance and decreased edema  Patient goal: Patient would like to be able to hike 5 miles.  Pain  Current pain ratin  At best pain ratin  At worst pain ratin  Quality: pressure  Relieving factors: rest (NSAIDs)  Aggravating factors: standing, walking and stair climbing      Diagnostic Tests  Abnormal x-ray: suggests lateral talar/distal fibular pseudoarthrosis.  Treatments  Current treatment: physical therapy      Objective     Observations     Additional Observation Details  Mild pocket of swelling R ATFL region.  No erythema, or ecchymosis.    Tenderness     Right Ankle/Foot   Tenderness in the anterior talofibular ligament. No tenderness in the fifth metatarsal base, lateral malleolus, medial malleolus and navicular.     Additional Tenderness Details  (+) TTP sinus tarsi    Neurological Testing     Sensation     Ankle/Foot   Left Ankle/Foot   Intact: light touch    Right Ankle/Foot   Intact: light touch     Passive Range of Motion   Left Hip   Normal passive range of motion    Right Hip   Normal passive range of motion    Additional Passive Range of Motion Details  Limited R ankle DF    Joint Play   Left Ankle/Foot  Joints within functional limits are the talocrural joint, subtalar joint, midfoot and forefoot.     Right Ankle/Foot  Hypermobile in the midfoot and forefoot.  Hypomobile in the talocrural joint and subtalar joint.     Strength/Myotome Testing     Left Ankle/Foot   Dorsiflexion: 5  Plantar flexion: 5  Inversion: 5  Eversion: 5    Right Ankle/Foot   Dorsiflexion: 5  Plantar flexion: 3  Inversion: 3+  Eversion: 3+    Additional Strength Details  Hip strength testing to be assessed at a later  date.    Tests     Right Hip   Positive long sit.     Right Ankle/Foot   Positive for navicular drop and windlass.   Negative for anterior drawer, eversion talar tilt, inversion talar tilt and syndesmosis squeeze.     Additional Tests Details  (+) Functional leg length discrepancy c/ R LE shorter    Functional Assessment        Single Leg Squat   Left Leg  Unable to perform.     Right Leg  Unable to perform .     Single Leg Stance   Left single leg stance time: unsteady.  Right single leg stance time: WNL.    Comments  Excessive R pronation c/ squatting    Posterior weight shift: moderate    Depth of femur height: above 90 degrees      Precautions: None    Manuals 11/1 11/8 11/15 11/22 12/13 12/20 12/27 1/3      TC mobs AZ AZ AZ AZ AZ AZ AZ AZ        Subtalar mobs  AZ AZ AZ AZ AZ AZ AZ        Manual isometrics  AZ 20x5s             Neuro Re-Ed                Ankle 4 way 20x5s green 30x5s blue 30x5s blue 30x5s blue 30x5s black 30x5s black 30x5s black 30x5s black                                       Ther Ex               Bike     10' 10' 10' 10'       Ball roll outs HEP 2' 2' 2' 2' 2' 2' 2'       1/2 kneeling DF mobility     20x2s 20x2s 20x2s 20x2s       Seated arch lifts 20x 20x 20x 30x 30x 30x 30x 30x       Towel scrunches 20x 20x 20x 30x 30x 30x 30x 30x       Seated heel lifts 3x10 3x10 D/c            Seated Toe raises 3x10 3x10 D/c             Standing heel raieses   2x10 3x12 3x15 3x15 3x15 3x15 3x15       Standing toe raises   2x10 3x12 3x15 3x15 3x15 3x15 3x15       Standing heel raises KF     3x15 3x15 3x15 3x15     Wall SL calf raises   3x5 4x5 4x5 4x5 4x5 4x5     Midfoot banded  arch lifts   3x10 yellow 3x10 yellow 3x10 green 3x10 green 3x10 green 3x10 green     Banded side steps   3x3 yellow 3x5 yellow 3x5 green 3x5 green 3x5 green 3x5   green     SL balance   10x to failure floor  10x to failure floor 10x to failure airex 10x to failure airex 10x to failure airex 10x to failure airex     LP    3x10 SL 70#  3x10 SL 70# 3x10 SL 70# 3x10 SL 70# 3x10 SL 90#     TM walking     5' incline 5% 10' incline 5% 10' incline 5%      Ther Activity                                                  Gait Training                                                 Modalities                EPAT plantar fascia        AZ                                 Nazanin Hyde, PT  1/3/2025,12:05 PM

## 2025-01-03 NOTE — PROGRESS NOTES
PT Evaluation     Today's date: 1/3/2025  Patient name: Emmanuel Bey  : 1970  MRN: [id number]  Referring provider: Jayme Murray DPM  Dx:   Encounter Diagnoses   Name Primary?    Chronic pain of right ankle Yes    Right foot pain     Peroneal tendinitis of right lower extremity                     Assessment  Impairments: abnormal muscle firing, abnormal or restricted ROM, abnormal movement, activity intolerance, impaired physical strength, lacks appropriate home exercise program, pain with function and weight-bearing intolerance    Assessment details: Emmanuel Bey is a 52 y/o male who presents with complaints of chronic right ankle pain.  The patient's greatest concern is not being able to perform functional activities without pain.  Primary movement impairment diagnosis of chronic ankle instability, resulting in pathoanatomical symptoms of chronic pain of right ankle, which limits his ability to perform functional activities without pain.  Pt. will benefit from skilled PT services that includes manual therapy techniques to enhance tissue extensibility, neuromuscular re-education to facilitate motor control, therapeutic exercise to increase functional mobility, and modalities prn to reduce pain and inflammation.   Understanding of Dx/Px/POC: good     Prognosis: good    Goals  Goals  Impairment Goals  - Pt I with initial HEP in 1-2 visits- achieved  - Improve ROM equal to contralateral side in 6 weeks- in progress  - Increase strength to at least 5/5 in all affected areas in 4-6 weeks- in progress     Functional Goals  - Increase Functional Status Measure to: goal status in 6-8 weeks- in progress  - Patient will be independent with comprehensive HEP in 6-8 weeks- in progress  - Ambulation is improved to prior level of function in 6-8 weeks- in progress  - Stair climbing is improved to prior level of function in 6-8 weeks- in progress  - Patient will be able to hike without pain in 6-8 weeks- in  progress    Plan  Patient would benefit from: PT eval  Planned modality interventions: thermotherapy: hydrocollator packs, low level laser therapy and cryotherapy  Other planned modality interventions: EPAT    Planned therapy interventions: joint mobilization, manual therapy, neuromuscular re-education, patient education, strengthening, stretching, therapeutic activities, therapeutic exercise, graded exercise, graded activity, flexibility, abdominal trunk stabilization and balance/weight bearing training    Frequency: 1x week  Therapy duration (weeks): 6-8 weeks.  Treatment plan discussed with: patient      Subjective Evaluation    History of Present Illness  Mechanism of injury: Patient states that he feels like his ankle is finally improving.  He has been compliant c/ wearing orthotics.  He has not increased walking a lot yet on his own.  His goal is to walk, hike, and increase his physical activity on a regular basis.      Patient Goals  Patient goals for therapy: decreased pain, increased strength, independence with ADLs/IADLs, return to sport/leisure activities, increased motion, improved balance and decreased edema  Patient goal: Patient would like to be able to hike 5 miles.  Pain  Current pain ratin  At best pain ratin  At worst pain ratin  Quality: pressure  Relieving factors: rest (NSAIDs)  Aggravating factors: standing, walking and stair climbing      Diagnostic Tests  Abnormal x-ray: suggests lateral talar/distal fibular pseudoarthrosis.  Treatments  Current treatment: physical therapy      Objective     Observations     Additional Observation Details  Mild pocket of swelling R ATFL region.  No erythema, or ecchymosis.    Tenderness     Right Ankle/Foot   Tenderness in the anterior talofibular ligament. No tenderness in the fifth metatarsal base, lateral malleolus, medial malleolus and navicular.     Additional Tenderness Details  (+) TTP sinus tarsi    Neurological Testing     Sensation      Ankle/Foot   Left Ankle/Foot   Intact: light touch    Right Ankle/Foot   Intact: light touch     Passive Range of Motion   Left Hip   Normal passive range of motion    Right Hip   Normal passive range of motion    Additional Passive Range of Motion Details  Limited R ankle DF    Joint Play   Left Ankle/Foot  Joints within functional limits are the talocrural joint, subtalar joint, midfoot and forefoot.     Right Ankle/Foot  Hypermobile in the midfoot and forefoot.  Hypomobile in the talocrural joint and subtalar joint.     Strength/Myotome Testing     Left Ankle/Foot   Dorsiflexion: 5  Plantar flexion: 5  Inversion: 5  Eversion: 5    Right Ankle/Foot   Dorsiflexion: 5  Plantar flexion: 3  Inversion: 3+  Eversion: 3+    Additional Strength Details  Hip strength testing to be assessed at a later date.    Tests     Right Hip   Positive long sit.     Right Ankle/Foot   Positive for navicular drop and windlass.   Negative for anterior drawer, eversion talar tilt, inversion talar tilt and syndesmosis squeeze.     Additional Tests Details  (+) Functional leg length discrepancy c/ R LE shorter    Functional Assessment        Single Leg Squat   Left Leg  Unable to perform.     Right Leg  Unable to perform .     Single Leg Stance   Left single leg stance time: unsteady.  Right single leg stance time: WNL.    Comments  Excessive R pronation c/ squatting    Posterior weight shift: moderate    Depth of femur height: above 90 degrees      Precautions: None    Manuals 11/1 11/8 11/15 11/22 12/13 12/20 12/27 1/3      TC mobs AZ AZ AZ AZ AZ AZ AZ AZ        Subtalar mobs  AZ AZ AZ AZ AZ AZ AZ        Manual isometrics  AZ 20x5s             Neuro Re-Ed                Ankle 4 way 20x5s green 30x5s blue 30x5s blue 30x5s blue 30x5s black 30x5s black 30x5s black 30x5s black                                       Ther Ex               Bike     10' 10' 10' 10'       Ball roll outs HEP 2' 2' 2' 2' 2' 2' 2'       1/2 kneeling DF mobility      20x2s 20x2s 20x2s 20x2s       Seated arch lifts 20x 20x 20x 30x 30x 30x 30x 30x       Towel scrunches 20x 20x 20x 30x 30x 30x 30x 30x       Seated heel lifts 3x10 3x10 D/c            Seated Toe raises 3x10 3x10 D/c             Standing heel raieses   2x10 3x12 3x15 3x15 3x15 3x15 3x15       Standing toe raises   2x10 3x12 3x15 3x15 3x15 3x15 3x15       Standing heel raises KF     3x15 3x15 3x15 3x15     Wall SL calf raises   3x5 4x5 4x5 4x5 4x5 4x5     Midfoot banded  arch lifts   3x10 yellow 3x10 yellow 3x10 green 3x10 green 3x10 green 3x10 green     Banded side steps   3x3 yellow 3x5 yellow 3x5 green 3x5 green 3x5 green 3x5   green     SL balance   10x to failure floor  10x to failure floor 10x to failure airex 10x to failure airex 10x to failure airex 10x to failure airex     LP    3x10 SL 70# 3x10 SL 70# 3x10 SL 70# 3x10 SL 70# 3x10 SL 90#     TM walking     5' incline 5% 10' incline 5% 10' incline 5%      Ther Activity                                                  Gait Training                                                 Modalities                EPAT plantar fascia        AZ                                 Nazanin Hyde, PT  1/3/2025,12:05 PM

## 2025-01-13 ENCOUNTER — OFFICE VISIT (OUTPATIENT)
Dept: PHYSICAL THERAPY | Facility: MEDICAL CENTER | Age: 55
End: 2025-01-13
Payer: COMMERCIAL

## 2025-01-13 DIAGNOSIS — M79.671 RIGHT FOOT PAIN: ICD-10-CM

## 2025-01-13 DIAGNOSIS — M76.71 PERONEAL TENDINITIS OF RIGHT LOWER EXTREMITY: ICD-10-CM

## 2025-01-13 DIAGNOSIS — G89.29 CHRONIC PAIN OF RIGHT ANKLE: Primary | ICD-10-CM

## 2025-01-13 DIAGNOSIS — M25.571 CHRONIC PAIN OF RIGHT ANKLE: Primary | ICD-10-CM

## 2025-01-13 PROCEDURE — 97140 MANUAL THERAPY 1/> REGIONS: CPT | Performed by: PHYSICAL THERAPIST

## 2025-01-13 PROCEDURE — 97110 THERAPEUTIC EXERCISES: CPT | Performed by: PHYSICAL THERAPIST

## 2025-01-13 NOTE — PROGRESS NOTES
Daily Note     Today's date: 2025  Patient name: Emmanuel Bey  : 1970  MRN: [id number]  Referring provider: Jayme Murray DPM  Dx:   Encounter Diagnosis     ICD-10-CM    1. Chronic pain of right ankle  M25.571     G89.29       2. Right foot pain  M79.671       3. Peroneal tendinitis of right lower extremity  M76.71                      Subjective: Patient states that he is doing well.       Objective: See treatment diary below.      Assessment: Tolerated treatment well. Patient would benefit from continued PT.      Plan: Continue per plan of care.      Precautions: None     Manuals           TC mobs AZ           Subtalar mobs  AZ           Manual isometrics            Neuro Re-Ed             Ankle 4 way 30x5s black                                    Ther Ex            Bike  10'          Ball roll outs 2'          1/2 kneeling DF mobility  20x2s          Seated arch lifts 30x          Towel scrunches 30x          Standing heel raieses  3x15          Standing toe raises  3x15          Standing heel raises KF  3x15          Wall SL calf raises  4x5           Midfoot banded  arch lifts  3x10 green           Banded side steps  3x5 green           SL balance  15x to failure airex           LP  3x10 SL 90#           TM walking  10' incline 5%           Ther Activity                                                           Gait Training                                                           Modalities                   EPAT plantar fascia  AZ

## 2025-01-17 ENCOUNTER — APPOINTMENT (OUTPATIENT)
Dept: PHYSICAL THERAPY | Facility: MEDICAL CENTER | Age: 55
End: 2025-01-17
Payer: COMMERCIAL

## 2025-01-19 DIAGNOSIS — B96.89 ACUTE BACTERIAL BRONCHITIS: ICD-10-CM

## 2025-01-19 DIAGNOSIS — J20.8 ACUTE BACTERIAL BRONCHITIS: ICD-10-CM

## 2025-01-20 RX ORDER — ALBUTEROL SULFATE 0.83 MG/ML
2.5 SOLUTION RESPIRATORY (INHALATION) EVERY 6 HOURS PRN
Qty: 120 ML | Refills: 0 | Status: SHIPPED | OUTPATIENT
Start: 2025-01-20

## 2025-01-20 NOTE — TELEPHONE ENCOUNTER
Patient called the office back to confirm that he does still need this nebulizer if possible. Pt states he has a open box at home however would like an extra one on hand. Please review and advise.

## 2025-01-20 NOTE — TELEPHONE ENCOUNTER
Called Pt as requested per PCP to confirm the need of the refill. No answer LM requesting Pt to call back.

## 2025-01-24 ENCOUNTER — APPOINTMENT (OUTPATIENT)
Dept: PHYSICAL THERAPY | Facility: MEDICAL CENTER | Age: 55
End: 2025-01-24
Payer: COMMERCIAL

## 2025-01-31 ENCOUNTER — OFFICE VISIT (OUTPATIENT)
Dept: PHYSICAL THERAPY | Facility: MEDICAL CENTER | Age: 55
End: 2025-01-31
Payer: COMMERCIAL

## 2025-01-31 DIAGNOSIS — M25.571 CHRONIC PAIN OF RIGHT ANKLE: Primary | ICD-10-CM

## 2025-01-31 DIAGNOSIS — M79.671 RIGHT FOOT PAIN: ICD-10-CM

## 2025-01-31 DIAGNOSIS — M76.71 PERONEAL TENDINITIS OF RIGHT LOWER EXTREMITY: ICD-10-CM

## 2025-01-31 DIAGNOSIS — G89.29 CHRONIC PAIN OF RIGHT ANKLE: Primary | ICD-10-CM

## 2025-01-31 PROCEDURE — 97140 MANUAL THERAPY 1/> REGIONS: CPT | Performed by: PHYSICAL THERAPIST

## 2025-01-31 PROCEDURE — 97110 THERAPEUTIC EXERCISES: CPT | Performed by: PHYSICAL THERAPIST

## 2025-01-31 NOTE — PROGRESS NOTES
Daily Note     Today's date: 2025  Patient name: Emmanuel Bey  : 1970  MRN: [id number]  Referring provider: Jayme Murray DPM  Dx: No diagnosis found.               Subjective: ***      Objective: See treatment diary below 3.0 16      Assessment: Tolerated treatment {Tolerated treatment :6295309601}. Patient {assessment:6439536323}      Plan: {PLAN:4749066063}     Precautions: None     Manuals           TC mobs AZ           Subtalar mobs  AZ           Manual isometrics            Neuro Re-Ed             Ankle 4 way 30x5s black                                    Ther Ex            Bike  10'          Ball roll outs 2'          1/2 kneeling DF mobility  20x2s          Seated arch lifts 30x          Towel scrunches 30x          Standing heel raieses  3x15          Standing toe raises  3x15          Standing heel raises KF  3x15          Wall SL calf raises  4x5           Midfoot banded  arch lifts  3x10 green           Banded side steps  3x5 green           SL balance  15x to failure airex           LP  3x10 SL 90#           TM walking  10' incline 5%           Ther Activity                                                           Gait Training                                                           Modalities                   EPAT plantar fascia  AZ

## 2025-01-31 NOTE — PROGRESS NOTES
PT Evaluation     Today's date: 2025  Patient name: Emmanuel Bey  : 1970  MRN: [id number]  Referring provider: Jayme Murray DPM  Dx:   Encounter Diagnoses   Name Primary?    Chronic pain of right ankle Yes    Right foot pain     Peroneal tendinitis of right lower extremity                     Assessment  Impairments: abnormal muscle firing, abnormal or restricted ROM, abnormal movement, activity intolerance, impaired physical strength, lacks appropriate home exercise program, pain with function and weight-bearing intolerance    Assessment details: Emmanuel Bey is a 54 y/o male who presents with complaints of chronic right ankle pain.  The patient's greatest concern is not being able to perform functional activities without pain.  Primary movement impairment diagnosis of chronic ankle instability, resulting in pathoanatomical symptoms of chronic pain of right ankle, which limits his ability to perform functional activities without pain.  Pt. will benefit from skilled PT services that includes manual therapy techniques to enhance tissue extensibility, neuromuscular re-education to facilitate motor control, therapeutic exercise to increase functional mobility, and modalities prn to reduce pain and inflammation.   Understanding of Dx/Px/POC: good     Prognosis: good    Goals  Goals  Impairment Goals  - Pt I with initial HEP in 1-2 visits- achieved  - Improve ROM equal to contralateral side in 6 weeks- in progress  - Increase strength to at least 5/5 in all affected areas in 4-6 weeks- in progress     Functional Goals  - Increase Functional Status Measure to: goal status in 6-8 weeks- in progress  - Patient will be independent with comprehensive HEP in 6-8 weeks- in progress  - Ambulation is improved to prior level of function in 6-8 weeks- in progress  - Stair climbing is improved to prior level of function in 6-8 weeks- in progress  - Patient will be able to hike without pain in 6-8 weeks- in  progress    Plan  Patient would benefit from: PT thi  Planned modality interventions: thermotherapy: hydrocollator packs, low level laser therapy and cryotherapy  Other planned modality interventions: EPAT    Planned therapy interventions: joint mobilization, manual therapy, neuromuscular re-education, patient education, strengthening, stretching, therapeutic activities, therapeutic exercise, graded exercise, graded activity, flexibility, abdominal trunk stabilization and balance/weight bearing training    Frequency: 1x week  Duration in weeks: 6 (6-8 weeks)  Treatment plan discussed with: patient      Subjective Evaluation    History of Present Illness  Mechanism of injury: Patient states that he feels like his ankle is finally improving but he is experiencing increase in pain after returning to work this week. He has been compliant c/ wearing orthotics.  He has not increased walking a lot yet on his own.  His goal is to walk, hike, and increase his physical activity on a regular basis.      Patient Goals  Patient goals for therapy: decreased pain, increased strength, independence with ADLs/IADLs, return to sport/leisure activities, increased motion, improved balance and decreased edema  Patient goal: Patient would like to be able to hike 5 miles.  Pain  Current pain ratin  At best pain ratin  At worst pain ratin  Quality: pressure  Relieving factors: rest (NSAIDs)  Aggravating factors: standing, walking and stair climbing      Diagnostic Tests  Abnormal x-ray: suggests lateral talar/distal fibular pseudoarthrosis.  Treatments  Current treatment: physical therapy      Objective     Observations     Additional Observation Details  Mild pocket of swelling R ATFL region.  No erythema, or ecchymosis.    Tenderness     Right Ankle/Foot   Tenderness in the anterior talofibular ligament. No tenderness in the fifth metatarsal base, lateral malleolus, medial malleolus and navicular.     Additional Tenderness  Details  (+) TTP sinus tarsi    Neurological Testing     Sensation     Ankle/Foot   Left Ankle/Foot   Intact: light touch    Right Ankle/Foot   Intact: light touch     Passive Range of Motion   Left Hip   Normal passive range of motion    Right Hip   Normal passive range of motion    Additional Passive Range of Motion Details  Limited R ankle DF    Joint Play   Left Ankle/Foot  Joints within functional limits are the talocrural joint, subtalar joint, midfoot and forefoot.     Right Ankle/Foot  Hypermobile in the midfoot and forefoot.  Hypomobile in the talocrural joint and subtalar joint.     Strength/Myotome Testing     Left Ankle/Foot   Dorsiflexion: 5  Plantar flexion: 5  Inversion: 5  Eversion: 5    Right Ankle/Foot   Dorsiflexion: 5  Plantar flexion: 3  Inversion: 3+  Eversion: 3+    Additional Strength Details  Hip strength testing to be assessed at a later date.    Tests     Right Hip   Positive long sit.     Right Ankle/Foot   Positive for navicular drop and windlass.   Negative for anterior drawer, eversion talar tilt, inversion talar tilt and syndesmosis squeeze.     Additional Tests Details  (+) Functional leg length discrepancy c/ R LE shorter    Functional Assessment        Single Leg Squat   Left Leg  Unable to perform.     Right Leg  Unable to perform .     Single Leg Stance   Left single leg stance time: unsteady.  Right single leg stance time: WNL.    Comments  Excessive R pronation c/ squatting    Posterior weight shift: moderate    Depth of femur height: above 90 degrees      Precautions: None    Manuals 1/13 1/31         TC mobs AZ JE          Subtalar mobs  AZ JE          Manual isometrics            Neuro Re-Ed             Ankle 4 way 30x5s black 30x5s black                                   Ther Ex            Bike  10' 10'         Ball roll outs 2' 2'         1/2 kneeling DF mobility  20x2s 20x2s         Seated arch lifts 30x 30x         Towel scrunches 30x 30x         Standing heel raieses   3x15 3x15         Standing toe raises  3x15 3x15         Standing heel raises KF  3x15 3x15         Wall SL calf raises  4x5  4x5         Midfoot banded  arch lifts  3x10 green  3x10 green         Banded side steps  3x5 green  3x5 green         SL balance  15x to failure airex  30x to failure airex         LP  3x10 SL 90#  3x10 SL 90#         TM walking  10' incline 5%  np (walking at home)         Ther Activity                                                           Gait Training                                                           Modalities                   EPAT plantar fascia  AZ  ADAM Aguillon, PT  1/31/2025,10:33 AM

## 2025-02-07 ENCOUNTER — OFFICE VISIT (OUTPATIENT)
Dept: PHYSICAL THERAPY | Facility: MEDICAL CENTER | Age: 55
End: 2025-02-07
Payer: COMMERCIAL

## 2025-02-07 DIAGNOSIS — G89.29 CHRONIC PAIN OF RIGHT ANKLE: Primary | ICD-10-CM

## 2025-02-07 DIAGNOSIS — M25.571 CHRONIC PAIN OF RIGHT ANKLE: Primary | ICD-10-CM

## 2025-02-07 DIAGNOSIS — M79.671 RIGHT FOOT PAIN: ICD-10-CM

## 2025-02-07 DIAGNOSIS — M76.71 PERONEAL TENDINITIS OF RIGHT LOWER EXTREMITY: ICD-10-CM

## 2025-02-07 PROCEDURE — 97140 MANUAL THERAPY 1/> REGIONS: CPT | Performed by: PHYSICAL THERAPIST

## 2025-02-07 PROCEDURE — 97110 THERAPEUTIC EXERCISES: CPT | Performed by: PHYSICAL THERAPIST

## 2025-02-07 NOTE — PROGRESS NOTES
Daily Note     Today's date: 2025  Patient name: Emmanuel Bey  : 1970  MRN: [id number]  Referring provider: Jayme Murray DPM  Dx:   Encounter Diagnosis     ICD-10-CM    1. Chronic pain of right ankle  M25.571     G89.29       2. Right foot pain  M79.671       3. Peroneal tendinitis of right lower extremity  M76.71                      Subjective: Patient states that he is doing well.       Objective: See treatment diary below.      Assessment:  Patient presents with decreased pain and good tolerance to tx.  Tolerated treatment well. Patient would benefit from continued PT.      Plan: Continue per plan of care.      Precautions: None     Manuals          TC mobs AZ AZ          Subtalar mobs  AZ AZ          Manual isometrics            Neuro Re-Ed             Ankle 4 way 30x5s black 30x5s black                                   Ther Ex            Bike  10' 10'         Ball roll outs 2' 2'         1/2 kneeling DF mobility  20x2s 20x2s         Seated arch lifts 30x 30x         Towel scrunches 30x 30x         Standing heel raieses  3x15 3x15         Standing toe raises  3x15 3x15         Standing heel raises KF  3x15 3x15         Wall SL calf raises  4x5  4x6         Midfoot banded  arch lifts  3x10 green 3x10 blue          Banded side steps  3x5 green  3x5 blue         SL balance  15x to failure airex 20x to failure airex         LP  3x10 SL 90#  3x10 #         TM walking  10' incline 5%  10' incline 5%         Ther Activity                                                           Gait Training                                                           Modalities                   EPAT plantar fascia  AZ  AZ

## 2025-02-14 ENCOUNTER — OFFICE VISIT (OUTPATIENT)
Dept: PHYSICAL THERAPY | Facility: MEDICAL CENTER | Age: 55
End: 2025-02-14
Payer: COMMERCIAL

## 2025-02-14 DIAGNOSIS — M79.671 RIGHT FOOT PAIN: ICD-10-CM

## 2025-02-14 DIAGNOSIS — G89.29 CHRONIC PAIN OF RIGHT ANKLE: Primary | ICD-10-CM

## 2025-02-14 DIAGNOSIS — M76.71 PERONEAL TENDINITIS OF RIGHT LOWER EXTREMITY: ICD-10-CM

## 2025-02-14 DIAGNOSIS — M25.571 CHRONIC PAIN OF RIGHT ANKLE: Primary | ICD-10-CM

## 2025-02-14 PROCEDURE — 97140 MANUAL THERAPY 1/> REGIONS: CPT | Performed by: PHYSICAL THERAPIST

## 2025-02-14 PROCEDURE — 97110 THERAPEUTIC EXERCISES: CPT | Performed by: PHYSICAL THERAPIST

## 2025-02-14 NOTE — PROGRESS NOTES
Daily Note     Today's date: 2025  Patient name: Emmanuel Bey  : 1970  MRN: [id number]  Referring provider: Jayme Murray DPM  Dx:   Encounter Diagnosis     ICD-10-CM    1. Chronic pain of right ankle  M25.571     G89.29       2. Right foot pain  M79.671       3. Peroneal tendinitis of right lower extremity  M76.71                      Subjective: Patient states he is doing well.       Objective: See treatment diary below.      Assessment:  Patient demonstrates good tolerance to progressions c/ decreased pain during functional activity.  Tolerated treatment well. Patient would benefit from continued PT.      Plan: Continue per plan of care.      Precautions: None     Manuals         TC mobs AZ AZ AZ         Subtalar mobs  AZ AZ AZ         Manual isometrics            Neuro Re-Ed             Ankle 4 way 30x5s black 30x5s black 30x5s black                                  Ther Ex            Bike  10' 10' 10'        Ball roll outs 2' 2' 2'        1/2 kneeling DF mobility  20x2s 20x2s 20x2s        Seated arch lifts 30x 30x 30x        Towel scrunches 30x 30x 30x        Standing heel raieses  3x15 3x15 3x15        Standing toe raises  3x15 3x15 3x15        Standing heel raises KF  3x15 3x15 3x15        Wall SL calf raises  4x5  4x6 4x8        Midfoot banded  arch lifts  3x10 green 3x10 blue  3x10 blue         Banded side steps  3x5 green  3x5 blue 3x5 blue        SL balance  15x to failure airex 20x to failure airex 20x to failure airex        LP  3x10 SL 90#  3x10 # 3x12 #        TM walking  10' incline 5%  10' incline 5% 10' incline 5%        Ther Activity                                                           Gait Training                                                           Modalities                   EPAT plantar fascia  AZ  AZ  AZ

## 2025-02-21 ENCOUNTER — APPOINTMENT (OUTPATIENT)
Dept: PHYSICAL THERAPY | Facility: MEDICAL CENTER | Age: 55
End: 2025-02-21
Payer: COMMERCIAL

## 2025-02-28 ENCOUNTER — OFFICE VISIT (OUTPATIENT)
Dept: PHYSICAL THERAPY | Facility: MEDICAL CENTER | Age: 55
End: 2025-02-28
Payer: COMMERCIAL

## 2025-02-28 DIAGNOSIS — M79.671 RIGHT FOOT PAIN: ICD-10-CM

## 2025-02-28 DIAGNOSIS — M76.71 PERONEAL TENDINITIS OF RIGHT LOWER EXTREMITY: ICD-10-CM

## 2025-02-28 DIAGNOSIS — M25.571 CHRONIC PAIN OF RIGHT ANKLE: Primary | ICD-10-CM

## 2025-02-28 DIAGNOSIS — G89.29 CHRONIC PAIN OF RIGHT ANKLE: Primary | ICD-10-CM

## 2025-02-28 PROCEDURE — 97140 MANUAL THERAPY 1/> REGIONS: CPT | Performed by: PHYSICAL THERAPIST

## 2025-02-28 PROCEDURE — 97110 THERAPEUTIC EXERCISES: CPT | Performed by: PHYSICAL THERAPIST

## 2025-02-28 NOTE — PROGRESS NOTES
Daily Note     Today's date: 2025  Patient name: Emmanuel Bey  : 1970  MRN: [id number]  Referring provider: Jayme Murray DPM  Dx:   Encounter Diagnosis     ICD-10-CM    1. Chronic pain of right ankle  M25.571     G89.29       2. Right foot pain  M79.671       3. Peroneal tendinitis of right lower extremity  M76.71                      Subjective: Patient states that he is doing well.       Objective: See treatment diary below.      Assessment:  Patient demonstrates good tolerance to progressions.  Tolerated treatment well. Patient would benefit from continued PT.      Plan: Continue per plan of care.      Precautions: None     Manuals        TC mobs AZ AZ AZ AZ        Subtalar mobs  AZ AZ AZ AZ        Manual isometrics            Neuro Re-Ed             Ankle 4 way 30x5s black 30x5s black 30x5s black 30x5s black                                 Ther Ex            Bike  10' 10' 10' 10'       Ball roll outs 2' 2' 2'        1/2 kneeling DF mobility  20x2s 20x2s 20x2s 20x2s       Seated arch lifts 30x 30x 30x 30x       Towel scrunches 30x 30x 30x 30x       Standing heel raieses  3x15 3x15 3x15 3x15        Standing toe raises  3x15 3x15 3x15 3x15       Standing heel raises KF  3x15 3x15 3x15 3x15       Wall SL calf raises  4x5  4x6 4x8 4x8       Midfoot banded  arch lifts  3x10 green 3x10 blue  3x10 blue  3x10 blue       Banded side steps  3x5 green  3x5 blue 3x5 blue 3x5 blue       SL balance  15x to failure airex 20x to failure airex 20x to failure airex 20x to failure airex       LP  3x10 SL 90#  3x10 # 3x12 # 3x15 #       TM walking  10' incline 5%  10' incline 5% 10' incline 5% 10' incline 5%       Ther Activity                                                           Gait Training                                                           Modalities                   EPAT plantar fascia  AZ  AZ  AZ  AZ

## 2025-03-07 ENCOUNTER — EVALUATION (OUTPATIENT)
Dept: PHYSICAL THERAPY | Facility: MEDICAL CENTER | Age: 55
End: 2025-03-07
Payer: COMMERCIAL

## 2025-03-07 DIAGNOSIS — G89.29 CHRONIC PAIN OF RIGHT ANKLE: Primary | ICD-10-CM

## 2025-03-07 DIAGNOSIS — M79.671 RIGHT FOOT PAIN: ICD-10-CM

## 2025-03-07 DIAGNOSIS — M25.571 CHRONIC PAIN OF RIGHT ANKLE: Primary | ICD-10-CM

## 2025-03-07 DIAGNOSIS — M76.71 PERONEAL TENDINITIS OF RIGHT LOWER EXTREMITY: ICD-10-CM

## 2025-03-07 PROCEDURE — 97164 PT RE-EVAL EST PLAN CARE: CPT | Performed by: PHYSICAL THERAPIST

## 2025-03-07 PROCEDURE — 97110 THERAPEUTIC EXERCISES: CPT | Performed by: PHYSICAL THERAPIST

## 2025-03-07 PROCEDURE — 97140 MANUAL THERAPY 1/> REGIONS: CPT | Performed by: PHYSICAL THERAPIST

## 2025-03-07 NOTE — PROGRESS NOTES
Re-Evaluation    Today's date: 3/7/2025  Patient name: Emmanuel Bey  : 1970  MRN: [id number]  Referring provider: Jayme Murray DPM  Dx:   Encounter Diagnosis     ICD-10-CM    1. Chronic pain of right ankle  M25.571     G89.29       2. Right foot pain  M79.671       3. Peroneal tendinitis of right lower extremity  M76.71                        Assessment  Impairments: abnormal muscle firing, abnormal or restricted ROM, abnormal movement, activity intolerance, impaired physical strength, lacks appropriate home exercise program, pain with function and weight-bearing intolerance    Assessment details: Emmanuel Bey is a 52 y/o male who has been attending physical therapy with complaints of chronic right ankle pain.  The patient's greatest concern is not being able to perform functional activities without pain.  Primary movement impairment diagnosis of chronic ankle instability, resulting in pathoanatomical symptoms of chronic pain of right ankle, which limits his ability to perform functional activities without pain.  Pt. will continue to benefit from skilled PT services that includes manual therapy techniques to enhance tissue extensibility, neuromuscular re-education to facilitate motor control, therapeutic exercise to increase functional mobility, and modalities prn to reduce pain and inflammation.   Understanding of Dx/Px/POC: good     Prognosis: good    Goals  Goals  Impairment Goals  - Pt I with initial HEP in 1-2 visits- achieved  - Improve ROM equal to contralateral side in 6 weeks- in progress  - Increase strength to at least 5/5 in all affected areas in 4-6 weeks- in progress     Functional Goals  - Increase Functional Status Measure to: goal status in 6-8 weeks- in progress  - Patient will be independent with comprehensive HEP in 6-8 weeks- in progress  - Ambulation is improved to prior level of function in 6-8 weeks- in progress  - Stair climbing is improved to prior level of function in 6-8  weeks- in progress  - Patient will be able to hike without pain in 6-8 weeks- in progress    Plan  Patient would benefit from: PT eval  Planned modality interventions: thermotherapy: hydrocollator packs, low level laser therapy and cryotherapy  Other planned modality interventions: EPAT    Planned therapy interventions: joint mobilization, manual therapy, neuromuscular re-education, patient education, strengthening, stretching, therapeutic activities, therapeutic exercise, graded exercise, graded activity, flexibility, abdominal trunk stabilization and balance/weight bearing training    Frequency: 1x week  Therapy duration (weeks): 6-8 weeks.  Treatment plan discussed with: patient      Subjective Evaluation    History of Present Illness  Mechanism of injury: Patient states that he feels like his ankle is finally improving.  He has been compliant c/ wearing orthotics.  He has not increased walking a lot yet on his own.  His goal is to walk, hike, and increase his physical activity on a regular basis.      Patient Goals  Patient goals for therapy: decreased pain, increased strength, independence with ADLs/IADLs, return to sport/leisure activities, increased motion, improved balance and decreased edema  Patient goal: Patient would like to be able to hike 5 miles.  Pain  Current pain ratin  At best pain ratin  At worst pain ratin    Diagnostic Tests  Abnormal x-ray: suggests lateral talar/distal fibular pseudoarthrosis.  Treatments  Current treatment: physical therapy      Objective     Observations     Additional Observation Details  Mild pocket of swelling R ATFL region.  No erythema, or ecchymosis.    Tenderness     Right Ankle/Foot   Tenderness in the anterior talofibular ligament. No tenderness in the fifth metatarsal base, lateral malleolus, medial malleolus and navicular.     Additional Tenderness Details  (+) TTP sinus tarsi    Neurological Testing     Sensation     Ankle/Foot   Left Ankle/Foot    Intact: light touch    Right Ankle/Foot   Intact: light touch     Passive Range of Motion   Left Hip   Normal passive range of motion    Right Hip   Normal passive range of motion    Additional Passive Range of Motion Details  Limited R ankle DF    Joint Play   Left Ankle/Foot  Joints within functional limits are the talocrural joint, subtalar joint, midfoot and forefoot.     Right Ankle/Foot  Hypermobile in the midfoot and forefoot.  Hypomobile in the talocrural joint and subtalar joint.     Strength/Myotome Testing     Left Ankle/Foot   Dorsiflexion: 5  Plantar flexion: 5  Inversion: 5  Eversion: 5    Right Ankle/Foot   Dorsiflexion: 5  Plantar flexion: 3  Inversion: 4  Eversion: 4-         Precautions: None     Manuals 1/13 2/7 2/14 2/28 3/7      TC mobs AZ AZ AZ AZ AZ       Subtalar mobs  AZ AZ AZ AZ AZ       Manual isometrics            Neuro Re-Ed             Ankle 4 way 30x5s black 30x5s black 30x5s black 30x5s black 40x5s black                                Ther Ex            Bike  10' 10' 10' 10' 10'      Ball roll outs 2' 2' 2'  2'      1/2 kneeling DF mobility  20x2s 20x2s 20x2s 20x2s 20x2s      Standing g-s str     3x30s      Long sitting gastroc str      3x30s      Seated arch lifts 30x 30x 30x 30x 30x      Towel scrunches 30x 30x 30x 30x 30x      Standing heel raieses  3x15 3x15 3x15 3x15  3x15       Standing toe raises  3x15 3x15 3x15 3x15 3x15       Standing heel raises KF  3x15 3x15 3x15 3x15 3x15       Wall SL calf raises  4x5  4x6 4x8 4x8 4X8      Midfoot banded  arch lifts  3x10 green 3x10 blue  3x10 blue  3x10 blue 3x10 blue      Banded side steps  3x5 green  3x5 blue 3x5 blue 3x5 blue 3x5 blue      SL balance  15x to failure airex 20x to failure airex 20x to failure airex 20x to failure airex 20x to failure airex      LP  3x10 SL 90#  3x10 # 3x12 # 3x15 # 3x10 SL       TM walking  10' incline 5%  10' incline 5% 10' incline 5% 10' incline 5% Walk @ home      Ther Activity                                                            Gait Training                                                           Modalities                   EPAT plantar fascia  AZ  AZ  AZ  AZ  AZ

## 2025-03-21 ENCOUNTER — APPOINTMENT (OUTPATIENT)
Dept: PHYSICAL THERAPY | Facility: MEDICAL CENTER | Age: 55
End: 2025-03-21
Payer: COMMERCIAL

## 2025-03-28 ENCOUNTER — OFFICE VISIT (OUTPATIENT)
Dept: PHYSICAL THERAPY | Facility: MEDICAL CENTER | Age: 55
End: 2025-03-28
Payer: COMMERCIAL

## 2025-03-28 DIAGNOSIS — G89.29 CHRONIC PAIN OF RIGHT ANKLE: Primary | ICD-10-CM

## 2025-03-28 DIAGNOSIS — M76.71 PERONEAL TENDINITIS OF RIGHT LOWER EXTREMITY: ICD-10-CM

## 2025-03-28 DIAGNOSIS — M25.571 CHRONIC PAIN OF RIGHT ANKLE: Primary | ICD-10-CM

## 2025-03-28 DIAGNOSIS — M79.671 RIGHT FOOT PAIN: ICD-10-CM

## 2025-03-28 PROCEDURE — 97110 THERAPEUTIC EXERCISES: CPT | Performed by: PHYSICAL THERAPIST

## 2025-03-28 PROCEDURE — 97140 MANUAL THERAPY 1/> REGIONS: CPT | Performed by: PHYSICAL THERAPIST

## 2025-03-28 NOTE — PROGRESS NOTES
Daily Note     Today's date: 3/28/2025  Patient name: Emmanuel Bey  : 1970  MRN: [id number]  Referring provider: Jayme Murray DPM  Dx:   Encounter Diagnosis     ICD-10-CM    1. Chronic pain of right ankle  M25.571     G89.29       2. Right foot pain  M79.671       3. Peroneal tendinitis of right lower extremity  M76.71                      Subjective:  Patient states that he feels great and was able to walk a lot on vacation without ankle issues.      Objective: See treatment diary below.      Assessment:  Patient demonstrates good tolerance to progressions.  Tolerated treatment well. Patient would benefit from continued PT.      Plan: Continue per plan of care.      Precautions: None     Manuals 1/13 2/7 2/14 2/28 3/7 3/28     TC mobs AZ AZ AZ AZ AZ AZ      Subtalar mobs  AZ AZ AZ AZ AZ AZ      Manual isometrics            Neuro Re-Ed             Ankle 4 way 30x5s black 30x5s black 30x5s black 30x5s black 40x5s black 40x5s black                               Ther Ex            Bike  10' 10' 10' 10' 10' 10'     Ball roll outs 2' 2' 2'  2' 2'     1/2 kneeling DF mobility  20x2s 20x2s 20x2s 20x2s 20x2s 20x2s     Standing g-s str     3x30s 3x30s     Long sitting gastroc str      3x30s 3x30s     Seated arch lifts 30x 30x 30x 30x 30x 30x     Towel scrunches 30x 30x 30x 30x 30x 30x     Standing heel raieses  3x15 3x15 3x15 3x15  3x15  3x15     Standing toe raises  3x15 3x15 3x15 3x15 3x15  3x15     Standing heel raises KF  3x15 3x15 3x15 3x15 3x15  3x15     Wall SL calf raises  4x5  4x6 4x8 4x8 4x8 4x8     Midfoot banded  arch lifts  3x10 green 3x10 blue  3x10 blue  3x10 blue 3x10 blue 3x10 blue     Banded side steps  3x5 green  3x5 blue 3x5 blue 3x5 blue 3x5 blue 3x10 blue     SL balance  15x to failure airex 20x to failure airex 20x to failure airex 20x to failure airex 20x to failure airex 30x to failure airex     LP  3x10 SL 90#  3x10 # 3x12 # 3x15 # 3x10 SL  3x10 #      TM walking   10' incline 5%  10' incline 5% 10' incline 5% 10' incline 5% Walk @ home Walk @ home     Ther Activity                                                           Gait Training                                                           Modalities                   EPAT plantar fascia  AZ  AZ  AZ  AZ  AZ  AZ

## 2025-04-04 ENCOUNTER — OFFICE VISIT (OUTPATIENT)
Dept: PHYSICAL THERAPY | Facility: MEDICAL CENTER | Age: 55
End: 2025-04-04
Payer: COMMERCIAL

## 2025-04-04 DIAGNOSIS — G89.29 CHRONIC PAIN OF RIGHT ANKLE: Primary | ICD-10-CM

## 2025-04-04 DIAGNOSIS — M79.671 RIGHT FOOT PAIN: ICD-10-CM

## 2025-04-04 DIAGNOSIS — M25.571 CHRONIC PAIN OF RIGHT ANKLE: Primary | ICD-10-CM

## 2025-04-04 PROCEDURE — 97140 MANUAL THERAPY 1/> REGIONS: CPT

## 2025-04-04 PROCEDURE — 97110 THERAPEUTIC EXERCISES: CPT

## 2025-04-04 NOTE — PROGRESS NOTES
Daily Note     Today's date: 2025  Patient name: Emmanuel Bey  : 1970  MRN: [id number]  Referring provider: Jayme Murray DPM  Dx:   Encounter Diagnosis     ICD-10-CM    1. Chronic pain of right ankle  M25.571     G89.29       2. Right foot pain  M79.671                      Subjective: Pt reports that he walked for 8+ miles in [facility] last weekend and felt soreness for a few days, but feels confident he will be progressing to hiking soon.      Objective: See treatment diary below      Assessment: Tolerated treatment well. Patient demonstrated fatigue post treatment, exhibited good technique with therapeutic exercises, and would benefit from continued PT  Pt is responding well to EPAT and current tx plan.  Pt displays a good understanding of his ex's and is making steady progress towards his goals.       Plan: Continue per plan of care.      Precautions: None     Manuals 1/13 2/7 2/14 2/28 3/7 3/28 4/4    TC mobs AZ AZ AZ AZ AZ AZ PROM  KO     Subtalar mobs  AZ AZ AZ AZ AZ AZ PROM  KO     Manual isometrics            Neuro Re-Ed             Ankle 4 way 30x5s black 30x5s black 30x5s black 30x5s black 40x5s black 40x5s black 40x5s  black                              Ther Ex            Bike  10' 10' 10' 10' 10' 10' 10'    Ball roll outs 2' 2' 2'  2' 2' 2'    1/2 kneeling DF mobility  20x2s 20x2s 20x2s 20x2s 20x2s 20x2s 20x2s    Standing g-s str     3x30s 3x30s 3x30s    Long sitting gastroc str      3x30s 3x30s 3x30s    Seated arch lifts 30x 30x 30x 30x 30x 30x 30x    Towel scrunches 30x 30x 30x 30x 30x 30x 30x    Standing heel raieses  3x15 3x15 3x15 3x15  3x15  3x15 3x15    Standing toe raises  3x15 3x15 3x15 3x15 3x15  3x15 3x15    Standing heel raises KF  3x15 3x15 3x15 3x15 3x15  3x15 3x15    Wall SL calf raises  4x5  4x6 4x8 4x8 4x8 4x8 4x8    Midfoot banded  arch lifts  3x10 green 3x10 blue  3x10 blue  3x10 blue 3x10 blue 3x10 blue 3x10  blue    Banded side steps  3x5 green  3x5 blue 3x5 blue 3x5 blue  3x5 blue 3x10 blue 3x10  blue    SL balance  15x to failure airex 20x to failure airex 20x to failure airex 20x to failure airex 20x to failure airex 30x to failure airex 30x to  Failure  airex    LP  3x10 SL 90#  3x10 # 3x12 # 3x15 # 3x10 SL  3x10 #  3x10  SL  120#    TM walking  10' incline 5%  10' incline 5% 10' incline 5% 10' incline 5% Walk @ home Walk @ home Walk  @  home      Ther Activity                                                           Gait Training                                                           Modalities                   EPAT plantar fascia  AZ  AZ  AZ  AZ  AZ  AZ  KO

## 2025-04-11 ENCOUNTER — APPOINTMENT (OUTPATIENT)
Dept: PHYSICAL THERAPY | Facility: MEDICAL CENTER | Age: 55
End: 2025-04-11
Payer: COMMERCIAL

## 2025-04-18 ENCOUNTER — APPOINTMENT (OUTPATIENT)
Dept: PHYSICAL THERAPY | Facility: MEDICAL CENTER | Age: 55
End: 2025-04-18
Payer: COMMERCIAL

## 2025-04-25 ENCOUNTER — OFFICE VISIT (OUTPATIENT)
Dept: PHYSICAL THERAPY | Facility: MEDICAL CENTER | Age: 55
End: 2025-04-25
Payer: COMMERCIAL

## 2025-04-25 DIAGNOSIS — G89.29 CHRONIC PAIN OF RIGHT ANKLE: Primary | ICD-10-CM

## 2025-04-25 DIAGNOSIS — M76.71 PERONEAL TENDINITIS OF RIGHT LOWER EXTREMITY: ICD-10-CM

## 2025-04-25 DIAGNOSIS — M25.571 CHRONIC PAIN OF RIGHT ANKLE: Primary | ICD-10-CM

## 2025-04-25 DIAGNOSIS — M79.671 RIGHT FOOT PAIN: ICD-10-CM

## 2025-04-25 PROCEDURE — 97140 MANUAL THERAPY 1/> REGIONS: CPT | Performed by: PHYSICAL THERAPIST

## 2025-04-25 PROCEDURE — 97110 THERAPEUTIC EXERCISES: CPT | Performed by: PHYSICAL THERAPIST

## 2025-04-25 NOTE — PROGRESS NOTES
Daily Note     Today's date: 2025  Patient name: Emmanuel Bey  : 1970  MRN: [id number]  Referring provider: Jayme Murray DPM  Dx:   Encounter Diagnosis     ICD-10-CM    1. Chronic pain of right ankle  M25.571     G89.29       2. Right foot pain  M79.671       3. Peroneal tendinitis of right lower extremity  M76.71                      Subjective: Patient states his ankle is doing really well.       Objective: See treatment diary below.      Assessment:  Patient presents without ankle pain and is doing great with progressions without pain.  Tolerated treatment well. Patient would benefit from continued PT      Plan: Continue per plan of care.      Precautions: None     Manuals 1/13 2/7 2/14 2/28 3/7 3/28 4/4 4/25   TC mobs AZ AZ AZ AZ AZ AZ PROM  KO AZ    Subtalar mobs  AZ AZ AZ AZ AZ AZ PROM  KO AZ    Manual isometrics            Neuro Re-Ed             Ankle 4 way 30x5s black 30x5s black 30x5s black 30x5s black 40x5s black 40x5s black 40x5s  black 50x5s black                             Ther Ex            Bike  10' 10' 10' 10' 10' 10' 10' 10'   Ball roll outs 2' 2' 2'  2' 2' 2'    1/2 kneeling DF mobility  20x2s 20x2s 20x2s 20x2s 20x2s 20x2s 20x2s 20x2s   Standing g-s str     3x30s 3x30s 3x30s 3x30s   Long sitting gastroc str      3x30s 3x30s 3x30s 3x30s   Seated arch lifts 30x 30x 30x 30x 30x 30x 30x 30x   Towel scrunches 30x 30x 30x 30x 30x 30x 30x 30x   Standing heel raieses  3x15 3x15 3x15 3x15  3x15  3x15 3x15 3x15   Standing toe raises  3x15 3x15 3x15 3x15 3x15  3x15 3x15 3x15   Standing heel raises KF  3x15 3x15 3x15 3x15 3x15  3x15 3x15 3x15   Wall SL calf raises  4x5  4x6 4x8 4x8 4x8 4x8 4x8 4x10   Midfoot banded  arch lifts  3x10 green 3x10 blue  3x10 blue  3x10 blue 3x10 blue 3x10 blue 3x10  blue 3x10 blue   Banded side steps  3x5 green  3x5 blue 3x5 blue 3x5 blue 3x5 blue 3x10 blue 3x10  blue 3x10 blue   SL balance  15x to failure airex 20x to failure airex 20x to failure airex 20x to  failure airex 20x to failure airex 30x to failure airex 30x to  Failure  airex 30x to failure airex   LP  3x10 SL 90#  3x10 # 3x12 # 3x15 # 3x10 SL  3x10 #  3x10  SL  120# 3x10 SL    TM walking  10' incline 5%  10' incline 5% 10' incline 5% 10' incline 5% Walk @ home Walk @ home Walk  @  home   Walk @ home   Ther Activity                                                           Gait Training                                                           Modalities                   EPAT plantar fascia  AZ  AZ  AZ  AZ  AZ  AZ  KO AZ

## 2025-05-02 ENCOUNTER — APPOINTMENT (OUTPATIENT)
Dept: RADIOLOGY | Facility: MEDICAL CENTER | Age: 55
End: 2025-05-02
Attending: ORTHOPAEDIC SURGERY
Payer: COMMERCIAL

## 2025-05-02 ENCOUNTER — OFFICE VISIT (OUTPATIENT)
Dept: PHYSICAL THERAPY | Facility: MEDICAL CENTER | Age: 55
End: 2025-05-02
Payer: COMMERCIAL

## 2025-05-02 ENCOUNTER — OFFICE VISIT (OUTPATIENT)
Dept: OBGYN CLINIC | Facility: MEDICAL CENTER | Age: 55
End: 2025-05-02
Payer: COMMERCIAL

## 2025-05-02 ENCOUNTER — APPOINTMENT (OUTPATIENT)
Dept: PHYSICAL THERAPY | Facility: MEDICAL CENTER | Age: 55
End: 2025-05-02
Payer: COMMERCIAL

## 2025-05-02 VITALS — HEIGHT: 70 IN | BODY MASS INDEX: 41.69 KG/M2 | WEIGHT: 291.2 LBS

## 2025-05-02 DIAGNOSIS — M17.11 PRIMARY OSTEOARTHRITIS OF RIGHT KNEE: ICD-10-CM

## 2025-05-02 DIAGNOSIS — Z01.89 ENCOUNTER FOR LOWER EXTREMITY COMPARISON IMAGING STUDY: ICD-10-CM

## 2025-05-02 DIAGNOSIS — M25.562 LEFT KNEE PAIN, UNSPECIFIED CHRONICITY: ICD-10-CM

## 2025-05-02 DIAGNOSIS — M25.562 ACUTE PAIN OF LEFT KNEE: Primary | ICD-10-CM

## 2025-05-02 DIAGNOSIS — M17.12 PRIMARY OSTEOARTHRITIS OF LEFT KNEE: Primary | ICD-10-CM

## 2025-05-02 PROCEDURE — 99214 OFFICE O/P EST MOD 30 MIN: CPT | Performed by: ORTHOPAEDIC SURGERY

## 2025-05-02 PROCEDURE — 97161 PT EVAL LOW COMPLEX 20 MIN: CPT | Performed by: PHYSICAL THERAPIST

## 2025-05-02 PROCEDURE — 73562 X-RAY EXAM OF KNEE 3: CPT

## 2025-05-02 PROCEDURE — 20610 DRAIN/INJ JOINT/BURSA W/O US: CPT | Performed by: ORTHOPAEDIC SURGERY

## 2025-05-02 PROCEDURE — 73564 X-RAY EXAM KNEE 4 OR MORE: CPT

## 2025-05-02 RX ORDER — LIDOCAINE HYDROCHLORIDE 10 MG/ML
3 INJECTION, SOLUTION EPIDURAL; INFILTRATION; INTRACAUDAL; PERINEURAL
Status: COMPLETED | OUTPATIENT
Start: 2025-05-02 | End: 2025-05-02

## 2025-05-02 RX ORDER — METHYLPREDNISOLONE ACETATE 40 MG/ML
1 INJECTION, SUSPENSION INTRA-ARTICULAR; INTRALESIONAL; INTRAMUSCULAR; SOFT TISSUE
Status: COMPLETED | OUTPATIENT
Start: 2025-05-02 | End: 2025-05-02

## 2025-05-02 RX ADMIN — LIDOCAINE HYDROCHLORIDE 3 ML: 10 INJECTION, SOLUTION EPIDURAL; INFILTRATION; INTRACAUDAL; PERINEURAL at 13:45

## 2025-05-02 RX ADMIN — METHYLPREDNISOLONE ACETATE 1 ML: 40 INJECTION, SUSPENSION INTRA-ARTICULAR; INTRALESIONAL; INTRAMUSCULAR; SOFT TISSUE at 13:45

## 2025-05-02 NOTE — PROGRESS NOTES
PT Evaluation     Today's date: 2025  Patient name: Emmanuel Bey  : 1970  MRN: [id number]  Referring provider: Nazanin Hyde PT, LIZAT  Dx:   Encounter Diagnosis   Name Primary?    Acute pain of left knee Yes                  Assessment  Impairments: abnormal muscle firing, abnormal or restricted ROM, activity intolerance, impaired physical strength, lacks appropriate home exercise program, pain with function, weight-bearing intolerance and activity limitations    Assessment details: Emmanuel Bey is a 54 y/o male who presents with complaints of acute left knee pain.  The patient's greatest concern is not being able to bear weight on the L LE d/t pain.  Primary movement impairment diagnosis of left knee hypomobility, resulting in pathoanatomical symptoms of acute pain of left knee, which limits his ability to perform functional activities without pain.  Pt. will benefit from referral to ortho for further imaging followed by skilled PT to address functional deficits and return patient to PLOF.   Understanding of Dx/Px/POC: good     Prognosis: good  Prognosis details: Prognosis given patient compliance to HEP and POC.     Goals  Patient will be independent c/ HEP in 4-6 weeks.  Patient will demonstrate full left knee ROM in 6-8 weeks.  Patient will demonstrate 5/5 knee strength at time of discharge.    Plan  Patient would benefit from: PT eval    Planned therapy interventions: strengthening, stretching, therapeutic activities, therapeutic exercise, manual therapy, neuromuscular re-education, home exercise program, graded exercise, graded activity, flexibility and balance/weight bearing training    Frequency: 1x week  Therapy duration (weeks): 8-12 weeks.  Treatment plan discussed with: patient  Subjective Evaluation    History of Present Illness  Mechanism of injury: Patient reports that about 3 weeks ago, he banged his left knee extremely hard when turning.  It did not swell at the time, but continued  to be painful with weight bearing and to the touch.  Last night, he twisted it somehow in bed and today he is unable to bear weight on it without pain.  He has a hx of L ACL reconstruction c/ hamstring allograft.  Patient is concerned about the point tenderness near the screw fixation.  He has difficulty bending his knee and putting weight on it when walking.  Patient would like to have increased ROM and decreased pain.    Patient Goals  Patient goals for therapy: decreased pain    Pain  Quality: sharp  Relieving factors: ice, heat and medications  Exacerbated by: walking/bending.      Diagnostic Tests  No diagnostic tests performed  Treatments  Current treatment: physical therapy  Objective     Observations   Left Knee   Negative for trophic changes.     Additional Observation Details  Mild L medial joint effusion at medial joint line.    Tenderness   Left Knee   Tenderness in the medial joint line.     Neurological Testing     Sensation     Knee   Left Knee   Intact: Light touch    Right Knee   Intact: light touch     Active Range of Motion   Left Knee   Flexion: 120 degrees   Extension: 0 degrees     Right Knee   Flexion: 130 degrees   Extension: 0 degrees     Mobility   Patellar Mobility:   Left Knee   Hypomobile: left medial, left lateral, left superior and left inferior    Right Knee   WFL: medial, lateral, superior and inferior  Tibiofemoral Mobility:   Left knee   Tibiofemoral tendons within functional limits include the anterior and posterior  Right knee Tibiofemoral tendons within functional limits include the anterior and posterior.     Patellar Static Positioning   Left Knee: WFL  Right Knee: WFL    Strength/Myotome Testing     Left Knee   Flexion: 3-  Extension: 3-  Quadriceps contraction: fair    Right Knee   Flexion: 5  Extension: 5  Quadriceps contraction: good    Tests     Left Knee   Negative anterior drawer, anterior Lachman, lateral Osmar, medial Osmar, posterior drawer, valgus stress test  at 0 degrees, valgus stress test at 30 degrees, varus stress test at 0 degrees and varus stress test at 30 degrees.         Nazanin Hyde, PT  5/2/2025,12:05 PM

## 2025-05-02 NOTE — PROGRESS NOTES
"Orthopedics Sports Medicine Knee New Patient Visit    Name: Emmanuel Bey      : 1970      MRN: [id number]  Encounter Provider: Carlitos Bradshaw DO  Encounter Date: 2025   Encounter department: [facility]  :  Assessment & Plan  Left knee pain, unspecified chronicity    Orders:    XR knee 4+ vw left injury; Future    Encounter for lower extremity comparison imaging study    Orders:    XR knee 3 vw right non injury; Future    Primary osteoarthritis of right knee    Orders:    Ambulatory Referral to Physical Therapy; Future          Assesment:   55 y.o. male left knee djd    Plan:    Conservative treatment:    Ice to knee for 20 minutes at least 1-2 times daily.  PT for ROM/strengthening to knee, hip and core.  Can consider knee replacement surgery    Imaging:    All imaging from today was reviewed by myself and explained to the patient.       Injection:    No Injection planned at this time.  Can consider cortisone injection in the future    Surgery:     No surgery is recommended at this point, continue with conservative treatment plan as noted.      Follow up:    No follow-ups on file.    Large joint arthrocentesis: L knee  Universal Protocol:  Consent given by: patient  Time out: Immediately prior to procedure a \"time out\" was called to verify the correct patient, procedure, equipment, support staff and site/side marked as required.  Site marked: the operative site was marked  Supporting Documentation  Indications: pain and joint swelling     Is this a Visco injection? NoProcedure Details  Location: knee - L knee  Preparation: Patient was prepped and draped in the usual sterile fashion  Needle size: 22 G  Ultrasound guidance: no  Approach: anterolateral  Medications administered: 3 mL lidocaine (PF) 1 %; 1 mL methylPREDNISolone acetate 40 mg/mL    Patient tolerance: patient tolerated the procedure well with no immediate complications  Dressing:  Sterile dressing " applied            History of Present Illness   HPI  Chief Complaint   Patient presents with    Left Knee - Pain       History of Present Illness:    The patient is a 55 y.o. male seen in clinic for evaluation of left knee pain.      He notes pain for the last 3 weeks.  He has done physical therapy in the past, but only after acl surgery.  He has had right knee injections in the past but not the left.      Pain is located anterior, medial.   The pain has been present for a few weeks.      The pain is characterized as sharp, stabbing.  The pain is present daily.      Pain is improved by rest.  Pain is aggravated by weight bearing.    Symptoms include swelling.     The patient has tried rest, ice, NSAIDS, and physical therapy.          Knee Surgical History:  Left knee ACL reconstruction 7 years    Past Medical, Social and Family History:  Past Medical History:   Diagnosis Date    Allergic     Allergy-induced asthma     Arthritis     Asthma     seasonsl allergy induced    Colon polyp     PONV (postoperative nausea and vomiting)      Past Surgical History:   Procedure Laterality Date    ARTHROSCOPIC REPAIR ACL Left     L knee    COLONOSCOPY      KNEE SURGERY      acl replacement left knee    NASAL SEPTUM SURGERY      NV ARTHRP ACETBLR/PROX FEM PROSTC AGRFT/ALGRFT Left 12/21/2023    Procedure: ARTHROPLASTY HIP TOTAL- SAME DAY;  Surgeon: Kenneth Grayson DO;  Location: [facility];  Service: Orthopedics     Allergies   Allergen Reactions    Pear - Food Allergy Anaphylaxis    Penicillins Anaphylaxis and Hives     Reaction Date: 14Mar2009;    Plum Pulp - Food Allergy Anaphylaxis    Fruit Extracts Other (See Comments)    Pollen Extract Cough     Current Outpatient Medications on File Prior to Visit   Medication Sig Dispense Refill    acetaminophen (TYLENOL) 500 mg tablet Take 2 tablets (1,000 mg total) by mouth every 8 (eight) hours (Patient taking differently: Take 1,000 mg by mouth every 8 (eight) hours Prn) 60 tablet  0    albuterol (2.5 mg/3 mL) 0.083 % nebulizer solution every 6 (six) hours as needed      albuterol (2.5 mg/3 mL) 0.083 % nebulizer solution Take 3 mL (2.5 mg total) by nebulization every 6 (six) hours as needed for wheezing or shortness of breath 120 mL 0    ciprofloxacin-dexamethasone (CIPRODEX) otic suspension Administer 4 drops into the left ear 2 (two) times a day 7.5 mL 0    diphenhydrAMINE (BENADRYL) 25 mg capsule Take 25 mg by mouth every 6 (six) hours as needed      Menthol (Halls Cough Drops) 5.8 MG LOZG Apply to the mouth or throat if needed      mometasone (Asmanex, 30 Metered Doses,) 110 mcg/actuation AEPB inhaler Inhale 2 puffs every evening Rinse mouth after use. 1 each 2    Multiple Vitamin (Daily Vites) tablet Take 1 tablet by mouth daily      ondansetron (ZOFRAN-ODT) 4 mg disintegrating tablet Take 1 tablet (4 mg total) by mouth every 6 (six) hours as needed for nausea or vomiting 20 tablet 0     No current facility-administered medications on file prior to visit.     Social History     Socioeconomic History    Marital status: Unknown     Spouse name: Not on file    Number of children: Not on file    Years of education: Not on file    Highest education level: Not on file   Occupational History    Not on file   Tobacco Use    Smoking status: Never    Smokeless tobacco: Never   Vaping Use    Vaping status: Never Used   Substance and Sexual Activity    Alcohol use: Not Currently     Alcohol/week: 1.0 standard drink of alcohol     Types: 1 Standard drinks or equivalent per week     Comment: probably more like 0.1 drinks per week on average    Drug use: Never    Sexual activity: Not Currently     Partners: Female   Other Topics Concern    Not on file   Social History Narrative    Not on file     Social Drivers of Health     Financial Resource Strain: Not on file   Food Insecurity: Not on file   Transportation Needs: Not on file   Physical Activity: Not on file   Stress: Not on file   Social  "Connections: Not on file   Intimate Partner Violence: Not on file   Housing Stability: Not on file         I have reviewed the past medical, surgical, social and family history, medications and allergies as documented in the EMR.    Review of systems: ROS is negative other than that noted in the HPI.  Constitutional: Negative for fatigue and fever.   HENT: Negative for sore throat.    Respiratory: Negative for shortness of breath.    Cardiovascular: Negative for chest pain.   Gastrointestinal: Negative for abdominal pain.   Endocrine: Negative for cold intolerance and heat intolerance.   Genitourinary: Negative for flank pain.   Musculoskeletal: Negative for back pain.   Skin: Negative for rash.   Allergic/Immunologic: Negative for immunocompromised state.   Neurological: Negative for dizziness.   Psychiatric/Behavioral: Negative for agitation.          Objective   Ht 5' 10\" (1.778 m)   Wt 132 kg (291 lb 3.2 oz)   BMI 41.78 kg/m²     Physical Exam:    Height 5' 10\" (1.778 m), weight 132 kg (291 lb 3.2 oz).    General/Constitutional: NAD, well developed, well nourished  HENT: Normocephalic, atraumatic  CV: Intact distal pulses, regular rate  Resp: No respiratory distress or labored breathing  GI: Soft and non-tender   Lymphatic: No lymphadenopathy palpated  Neuro: Alert and Oriented x 3, no focal deficits  Psych: Normal mood, normal affect, normal judgement, normal behavior  Skin: Warm, dry, no rashes, no erythema      Knee Exam (focused):                Left Right   ROM:   0-130 0-130   Palpation: Effusion negative negative     MJL tenderness Positive Negative     LJL tenderness Positive Negative   Meniscus: Osmar Negative Negative    Apley's Compression Negative Negative   Instability: Varus stable stable     Valgus stable stable   Special Tests: Lachman Negative Negative     Posterior drawer Negative Negative     Anterior drawer Negative Negative     Pivot shift not tested not tested     Dial not tested not " tested   Patella: Palpation no tenderness no tenderness     Mobility 1/4 1/4     Apprehension Negative Negative   Other: Single leg 1/4 squat not tested not tested      LE NV Exam: +2 DP/PT pulses bilaterally  Sensation intact to light touch L2-S1 bilaterally     Bilateral hip ROM demonstrates no pain actively or passively    No calf tenderness to palpation bilaterally    Knee Imaging    X-rays of the left knee were reviewed, which demonstrate left knee tricompartmental moderate osteoarthritis.  I have reviewed the radiology report and do not currently have a radiology reading from [facility], but will check the result once the reading is performed.

## 2025-05-09 ENCOUNTER — OFFICE VISIT (OUTPATIENT)
Dept: PHYSICAL THERAPY | Facility: MEDICAL CENTER | Age: 55
End: 2025-05-09
Payer: COMMERCIAL

## 2025-05-09 DIAGNOSIS — M25.562 ACUTE PAIN OF LEFT KNEE: Primary | ICD-10-CM

## 2025-05-09 DIAGNOSIS — M76.71 PERONEAL TENDINITIS OF RIGHT LOWER EXTREMITY: ICD-10-CM

## 2025-05-09 DIAGNOSIS — G89.29 CHRONIC PAIN OF RIGHT ANKLE: ICD-10-CM

## 2025-05-09 DIAGNOSIS — M25.571 CHRONIC PAIN OF RIGHT ANKLE: ICD-10-CM

## 2025-05-09 DIAGNOSIS — M79.671 RIGHT FOOT PAIN: ICD-10-CM

## 2025-05-09 PROCEDURE — 97140 MANUAL THERAPY 1/> REGIONS: CPT | Performed by: PHYSICAL THERAPIST

## 2025-05-09 PROCEDURE — 97164 PT RE-EVAL EST PLAN CARE: CPT | Performed by: PHYSICAL THERAPIST

## 2025-05-09 PROCEDURE — 97110 THERAPEUTIC EXERCISES: CPT | Performed by: PHYSICAL THERAPIST

## 2025-05-09 NOTE — PROGRESS NOTES
Re-Eval    Today's date: 2025  Patient name: Emmanuel Bey  : 1970  MRN: [id number]  Referring provider: Jayme Murray DPM  Dx:   Encounter Diagnosis     ICD-10-CM    1. Acute pain of left knee  M25.562       2. Chronic pain of right ankle  M25.571     G89.29       3. Right foot pain  M79.671       4. Peroneal tendinitis of right lower extremity  M76.71                      Re-Evaluation    Today's date: 2025  Patient name: Emmanuel Bey  : 1970  MRN: [id number]  Referring provider: Jayme Murray DPM  Dx:   Encounter Diagnosis     ICD-10-CM    1. Acute pain of left knee  M25.562       2. Chronic pain of right ankle  M25.571     G89.29       3. Right foot pain  M79.671       4. Peroneal tendinitis of right lower extremity  M76.71                        Assessment  Impairments: abnormal muscle firing, abnormal or restricted ROM, abnormal movement, activity intolerance, impaired physical strength, lacks appropriate home exercise program, pain with function and weight-bearing intolerance    Assessment details: Emmanuel Bey is a 52 y/o male who has been attending physical therapy with complaints of chronic right ankle pain and acute left knee pain.  The patient's greatest concern is not being able to perform functional activities without pain.  Primary movement impairment diagnosis of chronic ankle instability, resulting in pathoanatomical symptoms of chronic pain of right ankle and left knee hypomobility, which limits his ability to perform functional activities without pain.  Pt. will continue to benefit from skilled PT services that includes manual therapy techniques to enhance tissue extensibility, neuromuscular re-education to facilitate motor control, therapeutic exercise to increase functional mobility, and modalities prn to reduce pain and inflammation.   Understanding of Dx/Px/POC: good     Prognosis: good    Goals  Goals  Impairment Goals  - Pt I with initial HEP in 1-2 visits-  achieved  - Improve ROM equal to contralateral side in 6 weeks- in progress  - Increase strength to at least 5/5 in all affected areas in 4-6 weeks- in progress     Functional Goals  - Increase Functional Status Measure to: goal status in 6-8 weeks- in progress  - Patient will be independent with comprehensive HEP in 6-8 weeks- in progress  - Ambulation is improved to prior level of function in 6-8 weeks- in progress  - Stair climbing is improved to prior level of function in 6-8 weeks- in progress  - Patient will be able to hike without pain in 6-8 weeks- in progress    Plan  Patient would benefit from: PT eval  Planned modality interventions: thermotherapy: hydrocollator packs, low level laser therapy and cryotherapy  Other planned modality interventions: EPAT    Planned therapy interventions: joint mobilization, manual therapy, neuromuscular re-education, patient education, strengthening, stretching, therapeutic activities, therapeutic exercise, graded exercise, graded activity, flexibility, abdominal trunk stabilization and balance/weight bearing training    Frequency: 1x week  Therapy duration (weeks): 6-8 weeks.  Treatment plan discussed with: patient    Subjective Evaluation    History of Present Illness  Mechanism of injury: Patient states that he feels like his ankle is finally improving.  He has been compliant c/ wearing orthotics.  He has not increased walking a lot yet on his own.  His goal is to walk, hike, and increase his physical activity on a regular basis.      Patient Goals  Patient goals for therapy: decreased pain, increased strength, independence with ADLs/IADLs, return to sport/leisure activities, increased motion, improved balance and decreased edema  Patient goal: Patient would like to be able to hike 5 miles.  Pain  Current pain ratin    Diagnostic Tests  Abnormal x-ray: suggests lateral talar/distal fibular pseudoarthrosis.  Treatments  Current treatment: physical  therapy      Objective     Tenderness     Right Ankle/Foot   Tenderness in the anterior talofibular ligament. No tenderness in the fifth metatarsal base, lateral malleolus, medial malleolus and navicular.     Additional Tenderness Details  (+) TTP sinus tarsi    Neurological Testing     Sensation     Ankle/Foot   Left Ankle/Foot   Intact: light touch    Right Ankle/Foot   Intact: light touch     Active Range of Motion  R Knee  WNL    L Knee  120 degreees    Passive Range of Motion   Left Hip   Normal passive range of motion    Right Hip   Normal passive range of motion    L Knee  Normal passive range of motion with pain    Additional Passive Range of Motion Details  Limited R ankle DF    Joint Play   Left Ankle/Foot  Joints within functional limits are the talocrural joint, subtalar joint, midfoot and forefoot.     Right Ankle/Foot  Hypermobile in the midfoot and forefoot.  Hypomobile in the talocrural joint and subtalar joint.     Strength/Myotome Testing   R Knee  Flex: 5  Ext: 5    L Knee  Flex: 3-  Ext: 3    Left Ankle/Foot   Dorsiflexion: 5  Plantar flexion: 5  Inversion: 5  Eversion: 5    Right Ankle/Foot   Dorsiflexion: 5  Plantar flexion: 3  Inversion: 4  Eversion: 4-       Precautions: None     Manuals 5/9          TC mobs AZ           Subtalar mobs AZ           Manual isometrics           Neuro Re-Ed            Ankle 4 way 50x black           Quad sets 30x5s                      Ther Ex           Bike           Heel slides 30x          Hamstring str EOT 3x30s          SLRs 3x10          Ball roll outs 2'          1/2 kneeling DF mobility           Standing g-s str 3x30s          Long sitting gastroc str  3x30s          Seated arch lifts 30x          Towel scrunches 30x          Standing heel raieses 3x15          Standing toe raises 3x15          Standing heel raises KF           Wall SL calf raises           Midfoot banded  arch lifts           Banded side steps           SL balance           LP            TM walking           Ther Activity                                                      Gait Training                                                           Modalities                   EPAT plantar fascia  AZ

## 2025-05-16 ENCOUNTER — OFFICE VISIT (OUTPATIENT)
Dept: PHYSICAL THERAPY | Facility: MEDICAL CENTER | Age: 55
End: 2025-05-16
Payer: COMMERCIAL

## 2025-05-16 DIAGNOSIS — M25.562 ACUTE PAIN OF LEFT KNEE: Primary | ICD-10-CM

## 2025-05-16 DIAGNOSIS — G89.29 CHRONIC PAIN OF RIGHT ANKLE: ICD-10-CM

## 2025-05-16 DIAGNOSIS — M17.11 PRIMARY OSTEOARTHRITIS OF RIGHT KNEE: ICD-10-CM

## 2025-05-16 DIAGNOSIS — M76.71 PERONEAL TENDINITIS OF RIGHT LOWER EXTREMITY: ICD-10-CM

## 2025-05-16 DIAGNOSIS — M25.571 CHRONIC PAIN OF RIGHT ANKLE: ICD-10-CM

## 2025-05-16 DIAGNOSIS — M79.671 RIGHT FOOT PAIN: ICD-10-CM

## 2025-05-16 PROCEDURE — 97140 MANUAL THERAPY 1/> REGIONS: CPT | Performed by: PHYSICAL THERAPIST

## 2025-05-16 PROCEDURE — 97110 THERAPEUTIC EXERCISES: CPT | Performed by: PHYSICAL THERAPIST

## 2025-05-16 NOTE — PROGRESS NOTES
Daily Note     Today's date: 2025  Patient name: Emmanuel Bey  : 1970  MRN: [id number]  Referring provider: Jayme Murray DPM  Dx:   Encounter Diagnosis     ICD-10-CM    1. Acute pain of left knee  M25.562       2. Chronic pain of right ankle  M25.571     G89.29       3. Right foot pain  M79.671       4. Peroneal tendinitis of right lower extremity  M76.71       5. Primary osteoarthritis of right knee  M17.11                      Subjective: Patient states his ankle is doing really well.   Knee continues to be bothersome       Objective: See treatment diary below.      Assessment:  Patient presents without ankle pain and is doing great with progressions without pain.  Tolerated treatment well. Patient would benefit from continued PT   Focus on knee at this point with ankle doing very well       Plan: Continue per plan of care.      Precautions: None     Manuals 1/13 2/7 2/14 2/28 3/7 3/28 4/4 5/16   TC mobs AZ AZ AZ AZ AZ AZ PROM  KO AF    Subtalar mobs  AZ AZ AZ AZ AZ AZ PROM  KO AF    Manual isometrics            Neuro Re-Ed             Ankle 4 way 30x5s black 30x5s black 30x5s black 30x5s black 40x5s black 40x5s black 40x5s  black 50x5s black                             Ther Ex            Bike  10' 10' 10' 10' 10' 10' 10' 10'   Ball roll outs 2' 2' 2'  2' 2' 2'    1/2 kneeling DF mobility  20x2s 20x2s 20x2s 20x2s 20x2s 20x2s 20x2s 20x2s   Standing g-s str     3x30s 3x30s 3x30s 3x30s   Long sitting gastroc str      3x30s 3x30s 3x30s 3x30s   Seated arch lifts 30x 30x 30x 30x 30x 30x 30x 30x   Towel scrunches 30x 30x 30x 30x 30x 30x 30x 30x   Standing heel raieses  3x15 3x15 3x15 3x15  3x15  3x15 3x15 3x15   Standing toe raises  3x15 3x15 3x15 3x15 3x15  3x15 3x15 3x15   Standing heel raises KF  3x15 3x15 3x15 3x15 3x15  3x15 3x15 3x15   Wall SL calf raises  4x5  4x6 4x8 4x8 4x8 4x8 4x8 4x10   Midfoot banded  arch lifts  3x10 green 3x10 blue  3x10 blue  3x10 blue 3x10 blue 3x10 blue 3x10  blue 3x10  blue   Banded side steps  3x5 green  3x5 blue 3x5 blue 3x5 blue 3x5 blue 3x10 blue 3x10  blue 3x10 blue   SL balance  15x to failure airex 20x to failure airex 20x to failure airex 20x to failure airex 20x to failure airex 30x to failure airex 30x to  Failure  airex 30x to failure airex   LP  3x10 SL 90#  3x10 # 3x12 # 3x15 # 3x10 SL  3x10 #  3x10  SL  120# 3x10 SL    TM walking  10' incline 5%  10' incline 5% 10' incline 5% 10' incline 5% Walk @ home Walk @ home Walk  @  home   Walk @ home   Ther Activity                                                           Gait Training                                                           Modalities                   EPAT plantar fascia  AZ  AZ  AZ  AZ  AZ  AZ  KO AZ

## 2025-05-23 ENCOUNTER — OFFICE VISIT (OUTPATIENT)
Dept: PHYSICAL THERAPY | Facility: MEDICAL CENTER | Age: 55
End: 2025-05-23
Payer: COMMERCIAL

## 2025-05-23 DIAGNOSIS — M25.562 ACUTE PAIN OF LEFT KNEE: Primary | ICD-10-CM

## 2025-05-23 DIAGNOSIS — M25.571 CHRONIC PAIN OF RIGHT ANKLE: ICD-10-CM

## 2025-05-23 DIAGNOSIS — G89.29 CHRONIC PAIN OF RIGHT ANKLE: ICD-10-CM

## 2025-05-23 PROCEDURE — 97110 THERAPEUTIC EXERCISES: CPT | Performed by: PHYSICAL THERAPIST

## 2025-05-23 PROCEDURE — 97140 MANUAL THERAPY 1/> REGIONS: CPT | Performed by: PHYSICAL THERAPIST

## 2025-05-25 NOTE — PROGRESS NOTES
Daily Note     Today's date: 2025  Patient name: Emmanuel Bey  : 1970  MRN: [id number]  Referring provider: Jayme Murray DPM  Dx:   Encounter Diagnosis     ICD-10-CM    1. Acute pain of left knee  M25.562       2. Chronic pain of right ankle  M25.571     G89.29                      Subjective: Patient states his ankle is doing really well.   Knee continues to be bothersome       Objective: See treatment diary below.      Assessment:  Patient presents without ankle pain and is doing great with progressions without pain.  Tolerated treatment well. Patient would benefit from continued PT   Focus on knee at this point with ankle doing very well       Plan: Continue per plan of care.      Precautions: None     Manuals 1/13 2/7 2/14 2/28 3/7 3/28 4/4 5/23   TC mobs AZ AZ AZ AZ AZ AZ PROM  KO AF    Subtalar mobs  AZ AZ AZ AZ AZ AZ PROM  KO AF    Manual isometrics            Neuro Re-Ed             Ankle 4 way 30x5s black 30x5s black 30x5s black 30x5s black 40x5s black 40x5s black 40x5s  black 50x5s black                             Ther Ex            Bike  10' 10' 10' 10' 10' 10' 10' 10'   Ball roll outs 2' 2' 2'  2' 2' 2'    1/2 kneeling DF mobility  20x2s 20x2s 20x2s 20x2s 20x2s 20x2s 20x2s 20x2s   Standing g-s str     3x30s 3x30s 3x30s 3x30s   Long sitting gastroc str      3x30s 3x30s 3x30s 3x30s   Seated arch lifts 30x 30x 30x 30x 30x 30x 30x 30x   Towel scrunches 30x 30x 30x 30x 30x 30x 30x 30x   Standing heel raieses  3x15 3x15 3x15 3x15  3x15  3x15 3x15 3x15   Standing toe raises  3x15 3x15 3x15 3x15 3x15  3x15 3x15 3x15   Standing heel raises KF  3x15 3x15 3x15 3x15 3x15  3x15 3x15 3x15   Wall SL calf raises  4x5  4x6 4x8 4x8 4x8 4x8 4x8 4x10   Midfoot banded  arch lifts  3x10 green 3x10 blue  3x10 blue  3x10 blue 3x10 blue 3x10 blue 3x10  blue 3x10 blue   Banded side steps  3x5 green  3x5 blue 3x5 blue 3x5 blue 3x5 blue 3x10 blue 3x10  blue 3x10 blue   SL balance  15x to failure airex 20x to  failure airex 20x to failure airex 20x to failure airex 20x to failure airex 30x to failure airex 30x to  Failure  airex 30x to failure airex   LP  3x10 SL 90#  3x10 # 3x12 # 3x15 # 3x10 SL  3x10 #  3x10  SL  120# 3x10 SL    TM walking  10' incline 5%  10' incline 5% 10' incline 5% 10' incline 5% Walk @ home Walk @ home Walk  @  home   Walk @ home   Ther Activity                                                           Gait Training                                                           Modalities                   EPAT plantar fascia  AZ  AZ  AZ  AZ  AZ  AZ  KO AZ

## 2025-05-30 ENCOUNTER — APPOINTMENT (OUTPATIENT)
Dept: PHYSICAL THERAPY | Facility: MEDICAL CENTER | Age: 55
End: 2025-05-30
Payer: COMMERCIAL

## 2025-06-06 ENCOUNTER — OFFICE VISIT (OUTPATIENT)
Dept: PHYSICAL THERAPY | Facility: MEDICAL CENTER | Age: 55
End: 2025-06-06
Payer: COMMERCIAL

## 2025-06-06 DIAGNOSIS — M25.562 ACUTE PAIN OF LEFT KNEE: Primary | ICD-10-CM

## 2025-06-06 DIAGNOSIS — M76.71 PERONEAL TENDINITIS OF RIGHT LOWER EXTREMITY: ICD-10-CM

## 2025-06-06 DIAGNOSIS — M17.11 PRIMARY OSTEOARTHRITIS OF RIGHT KNEE: ICD-10-CM

## 2025-06-06 DIAGNOSIS — G89.29 CHRONIC PAIN OF RIGHT ANKLE: ICD-10-CM

## 2025-06-06 DIAGNOSIS — M25.571 CHRONIC PAIN OF RIGHT ANKLE: ICD-10-CM

## 2025-06-06 DIAGNOSIS — M79.671 RIGHT FOOT PAIN: ICD-10-CM

## 2025-06-06 PROCEDURE — 97110 THERAPEUTIC EXERCISES: CPT | Performed by: PHYSICAL THERAPIST

## 2025-06-06 PROCEDURE — 97140 MANUAL THERAPY 1/> REGIONS: CPT | Performed by: PHYSICAL THERAPIST

## 2025-06-06 NOTE — PROGRESS NOTES
Daily Note     Today's date: 2025  Patient name: Emmanuel Bey  : 1970  MRN: [id number]  Referring provider: Jayme Murray DPM  Dx:   Encounter Diagnosis     ICD-10-CM    1. Acute pain of left knee  M25.562       2. Chronic pain of right ankle  M25.571     G89.29       3. Right foot pain  M79.671       4. Primary osteoarthritis of right knee  M17.11       5. Peroneal tendinitis of right lower extremity  M76.71                      Subjective: Patient states that he is doing okay now, but was pretty bruised after lv d/t IASTM.    Objective: See treatment diary below.    Assessment:  Patient presents c/ continued pain at the medial tibial plateau region.  He was able to tolerate all exercise progressions well.  Discussed HEP for both the knee and ankle.  Patient would benefit from continued PT.    Plan: Continue per plan of care.      Precautions: None     Manuals           TC mobs AZ          Subtalar mobs AZ                     Neuro Re-Ed           Ankle 4 way                                   Ther Ex           Bike 10'          Ball roll outs           1/2 kneeling DF mobility           Standing g-s str           Long sitting gastroc str  3x30s          Hamstring str EOT 3x30s          Prone quad str 3x30s          SLRx4 2x10                     Prone quad sets 3x10 5s                     Sup hip add 3x10 5s          Bridges c/ ball 2x10          Clams  3x10                                LP           TM walking           Ther Activity                                      Gait Training                                      Modalities            EPAT plantar fascia AZ

## 2025-06-13 ENCOUNTER — OFFICE VISIT (OUTPATIENT)
Dept: PHYSICAL THERAPY | Facility: MEDICAL CENTER | Age: 55
End: 2025-06-13
Payer: COMMERCIAL

## 2025-06-13 DIAGNOSIS — G89.29 CHRONIC PAIN OF RIGHT ANKLE: ICD-10-CM

## 2025-06-13 DIAGNOSIS — M17.11 PRIMARY OSTEOARTHRITIS OF RIGHT KNEE: ICD-10-CM

## 2025-06-13 DIAGNOSIS — M25.571 CHRONIC PAIN OF RIGHT ANKLE: ICD-10-CM

## 2025-06-13 DIAGNOSIS — M25.562 ACUTE PAIN OF LEFT KNEE: Primary | ICD-10-CM

## 2025-06-13 DIAGNOSIS — M79.671 RIGHT FOOT PAIN: ICD-10-CM

## 2025-06-13 DIAGNOSIS — M76.71 PERONEAL TENDINITIS OF RIGHT LOWER EXTREMITY: ICD-10-CM

## 2025-06-13 PROCEDURE — 97140 MANUAL THERAPY 1/> REGIONS: CPT | Performed by: PHYSICAL THERAPIST

## 2025-06-13 PROCEDURE — 97110 THERAPEUTIC EXERCISES: CPT | Performed by: PHYSICAL THERAPIST

## 2025-06-13 NOTE — PROGRESS NOTES
Daily Note     Today's date: 2025  Patient name: Emmanuel Bey  : 1970  MRN: [id number]  Referring provider: Jayme Murray DPM  Dx:   Encounter Diagnosis     ICD-10-CM    1. Acute pain of left knee  M25.562       2. Chronic pain of right ankle  M25.571     G89.29       3. Right foot pain  M79.671       4. Primary osteoarthritis of right knee  M17.11       5. Peroneal tendinitis of right lower extremity  M76.71                      Subjective: Patient states he is doing well.       Objective: See treatment diary below.      Assessment:  Pt demonstrates good tolerance to progressions.  Tolerated treatment well. Patient would benefit from continued PT.      Plan: Continue per plan of care.      Precautions: None     Manuals          TC mobs AZ AZ         Subtalar mobs AZ AZ                    Neuro Re-Ed           Ankle 4 way            Quad sets  30x5s                     Ther Ex           Bike 10' 10'         Ball roll outs           1/2 kneeling DF mobility           Standing g-s str           Long sitting gastroc str  3x30s 3x30s         Hamstring str EOT 3x30s 3x30s         Prone quad str 3x30s 3x30s         SLRx4 2x10 3x10                    Prone quad sets 3x10 5s 3x10 5s                    Sup hip add 3x10 5s 3x10 5s         Bridges c/ ball 2x10 3x10         Clams  3x10 3x10                               LP           TM walking           Ther Activity                                      Gait Training                                      Modalities            EPAT plantar fascia AZ AZ

## 2025-06-27 ENCOUNTER — OFFICE VISIT (OUTPATIENT)
Dept: PHYSICAL THERAPY | Facility: MEDICAL CENTER | Age: 55
End: 2025-06-27
Payer: COMMERCIAL

## 2025-06-27 DIAGNOSIS — M17.11 PRIMARY OSTEOARTHRITIS OF RIGHT KNEE: ICD-10-CM

## 2025-06-27 DIAGNOSIS — M25.562 ACUTE PAIN OF LEFT KNEE: Primary | ICD-10-CM

## 2025-06-27 DIAGNOSIS — M79.671 RIGHT FOOT PAIN: ICD-10-CM

## 2025-06-27 DIAGNOSIS — G89.29 CHRONIC PAIN OF RIGHT ANKLE: ICD-10-CM

## 2025-06-27 DIAGNOSIS — M76.71 PERONEAL TENDINITIS OF RIGHT LOWER EXTREMITY: ICD-10-CM

## 2025-06-27 DIAGNOSIS — M25.571 CHRONIC PAIN OF RIGHT ANKLE: ICD-10-CM

## 2025-06-27 PROCEDURE — 97110 THERAPEUTIC EXERCISES: CPT | Performed by: PHYSICAL THERAPIST

## 2025-06-27 PROCEDURE — 97140 MANUAL THERAPY 1/> REGIONS: CPT | Performed by: PHYSICAL THERAPIST

## 2025-06-27 NOTE — PROGRESS NOTES
Daily Note     Today's date: 2025  Patient name: Emmanuel Bey  : 1970  MRN: [id number]  Referring provider: Jayme Murray DPM  Dx:   Encounter Diagnosis     ICD-10-CM    1. Acute pain of left knee  M25.562       2. Chronic pain of right ankle  M25.571     G89.29       3. Right foot pain  M79.671       4. Primary osteoarthritis of right knee  M17.11       5. Peroneal tendinitis of right lower extremity  M76.71                      Subjective: Patient states that he injured his left knee twice since lv.  Both injuries involved stepping in a weird way.  He believes there was some swelling after both injuries.  The pain is located in the same area as before (medial joint line).  He notes slight improvement since the most recent injury on Wednesday.  Patient states that he made an appointment with Dr. Grayson to follow up regarding possible future TKA.        Objective: See treatment diary below.      Assessment:  Patient presents c/ TTP t/o medial joint line and pain with weight bearing at times, depending on the position of his foot.  Ligamentous testing is negative.  He does have pain c/ extension c/ overpressure.  May have medial meniscus tear as well as medial compartment narrowing.  Pt able to tolerate table exercises today.  Tolerated treatment well. Patient would benefit from continued PT and follow up c/ Dr. Grayson as scheduled.      Plan: Continue per plan of care.      Precautions: None     Manuals         TC mobs AZ AZ AZ        Subtalar mobs AZ AZ AZ                   Neuro Re-Ed           Ankle 4 way            Quad sets  30x5s 30x5s                    Ther Ex           Bike 10' 10'         Ball roll outs           1/2 kneeling DF mobility           Standing g-s str           Long sitting gastroc str  3x30s 3x30s 3x30s        Hamstring str EOT 3x30s 3x30s 3x30s        Prone quad str 3x30s 3x30s 3x30s        SLRx4 2x10 3x10 3x10                   Prone quad sets 3x10 5s 3x10 5s  3x10 5s                   Sup hip add 3x10 5s 3x10 5s 3x12 5s        Bridges c/ ball 2x10 3x10 3x12        Clams  3x10 3x10 3x12                              LP           TM walking           Ther Activity                                      Gait Training                                      Modalities            EPAT plantar fascia AZ AZ AZ

## 2025-07-11 ENCOUNTER — OFFICE VISIT (OUTPATIENT)
Dept: PHYSICAL THERAPY | Facility: MEDICAL CENTER | Age: 55
End: 2025-07-11
Payer: COMMERCIAL

## 2025-07-11 DIAGNOSIS — M25.571 CHRONIC PAIN OF RIGHT ANKLE: ICD-10-CM

## 2025-07-11 DIAGNOSIS — G89.29 CHRONIC PAIN OF RIGHT ANKLE: ICD-10-CM

## 2025-07-11 DIAGNOSIS — M79.671 RIGHT FOOT PAIN: ICD-10-CM

## 2025-07-11 DIAGNOSIS — M25.562 ACUTE PAIN OF LEFT KNEE: Primary | ICD-10-CM

## 2025-07-11 DIAGNOSIS — M17.11 PRIMARY OSTEOARTHRITIS OF RIGHT KNEE: ICD-10-CM

## 2025-07-11 DIAGNOSIS — M76.71 PERONEAL TENDINITIS OF RIGHT LOWER EXTREMITY: ICD-10-CM

## 2025-07-11 PROCEDURE — 97110 THERAPEUTIC EXERCISES: CPT | Performed by: PHYSICAL THERAPIST

## 2025-07-11 PROCEDURE — 97140 MANUAL THERAPY 1/> REGIONS: CPT | Performed by: PHYSICAL THERAPIST

## 2025-07-11 PROCEDURE — 97164 PT RE-EVAL EST PLAN CARE: CPT | Performed by: PHYSICAL THERAPIST

## 2025-07-11 NOTE — PROGRESS NOTES
Re-Eval    Today's date: 2025  Patient name: Emmanuel Bey  : 1970  MRN: [id number]  Referring provider: Jayme Murray DPM  Dx:   Encounter Diagnosis     ICD-10-CM    1. Acute pain of left knee  M25.562       2. Chronic pain of right ankle  M25.571     G89.29       3. Right foot pain  M79.671       4. Primary osteoarthritis of right knee  M17.11       5. Peroneal tendinitis of right lower extremity  M76.71                        Assessment  Impairments: abnormal muscle firing, abnormal or restricted ROM, abnormal movement, activity intolerance, impaired physical strength, lacks appropriate home exercise program, pain with function and weight-bearing intolerance    Assessment details: Emmanuel Bey is a 52 y/o male who has been attending physical therapy with complaints of chronic right ankle pain and acute left knee pain.  The patient's greatest concern is not being able to perform functional activities without pain.  Primary movement impairment diagnosis of chronic ankle instability, resulting in pathoanatomical symptoms of chronic pain of right ankle and left knee hypomobility, which limits his ability to perform functional activities without pain.  Pt. will continue to benefit from skilled PT services that includes manual therapy techniques to enhance tissue extensibility, neuromuscular re-education to facilitate motor control, therapeutic exercise to increase functional mobility, and modalities prn to reduce pain and inflammation.   Understanding of Dx/Px/POC: good     Prognosis: good    Goals  Goals  Impairment Goals  - Pt I with initial HEP in 1-2 visits- achieved  - Improve ROM equal to contralateral side in 6 weeks- in progress  - Increase strength to at least 5/5 in all affected areas in 4-6 weeks- in progress     Functional Goals  - Increase Functional Status Measure to: goal status in 6-8 weeks- in progress  - Patient will be independent with comprehensive HEP in 6-8 weeks- in progress  -  Ambulation is improved to prior level of function in 6-8 weeks- in progress  - Stair climbing is improved to prior level of function in 6-8 weeks- in progress  - Patient will be able to hike without pain in 6-8 weeks- in progress    Plan  Patient would benefit from: PT thi  Planned modality interventions: thermotherapy: hydrocollator packs, low level laser therapy and cryotherapy  Other planned modality interventions: EPAT    Planned therapy interventions: joint mobilization, manual therapy, neuromuscular re-education, patient education, strengthening, stretching, therapeutic activities, therapeutic exercise, graded exercise, graded activity, flexibility, abdominal trunk stabilization and balance/weight bearing training    Frequency: 1x week  Therapy duration (weeks): 6-8 weeks.  Treatment plan discussed with: patient    Subjective Evaluation    History of Present Illness  Mechanism of injury: Patient states that he feels like his ankle is finally improving.  He has been compliant c/ wearing orthotics.  He has not increased walking a lot yet on his own.  His goal is to walk, hike, and increase his physical activity on a regular basis.      Patient Goals  Patient goals for therapy: decreased pain, increased strength, independence with ADLs/IADLs, return to sport/leisure activities, increased motion, improved balance and decreased edema  Patient goal: Patient would like to be able to hike 5 miles.  Pain  Current pain ratin    Diagnostic Tests  Abnormal x-ray: suggests lateral talar/distal fibular pseudoarthrosis.  Treatments  Current treatment: physical therapy      Objective     Tenderness     Right Ankle/Foot   Tenderness in the anterior talofibular ligament. No tenderness in the fifth metatarsal base, lateral malleolus, medial malleolus and navicular.     Additional Tenderness Details  (+) TTP sinus tarsi    Neurological Testing     Sensation     Ankle/Foot   Left Ankle/Foot   Intact: light touch    Right  Ankle/Foot   Intact: light touch     Active Range of Motion  R Knee  WNL    L Knee  0-125 degreees    Passive Range of Motion   Left Hip   Normal passive range of motion    Right Hip   Normal passive range of motion    L Knee  Normal passive range of motion with pain    Additional Passive Range of Motion Details  Limited R ankle DF    Joint Play   Left Ankle/Foot  Joints within functional limits are the talocrural joint, subtalar joint, midfoot and forefoot.     Right Ankle/Foot  Hypermobile in the midfoot and forefoot.  Hypomobile in the talocrural joint and subtalar joint.     Strength/Myotome Testing   R Knee  Flex: 5  Ext: 5    L Knee  Flex: 4  Ext: 4     Precautions: None     Manuals 6/6 6/13 6/27 7/11       TC mobs AZ AZ AZ AZA       Subtalar mobs AZ AZ AZ AZ                  Neuro Re-Ed           Ankle 4 way            Quad sets  30x5s 30x5s 30x5s                   Ther Ex           Bike 10' 10'  10'       Ball roll outs           1/2 kneeling DF mobility           Standing g-s str           Long sitting gastroc str  3x30s 3x30s 3x30s 3x30s       Hamstring str EOT 3x30s 3x30s 3x30s 3x30s       Prone quad str 3x30s 3x30s 3x30s 3x30s       SLRx4 2x10 3x10 3x10 3x12                  Prone quad sets 3x10 5s 3x10 5s 3x10 5s 3x15 5s                  Sup hip add 3x10 5s 3x10 5s 3x12 5s 3x12 5s       Bridges c/ ball 2x10 3x10 3x12 3x12       Clams  3x10 3x10 3x12 3x12                             LP           TM walking           Ther Activity                                      Gait Training                                      Modalities            EPAT plantar fascia AZ AZ AZ AZ                                                                                                              101

## 2025-07-18 ENCOUNTER — OFFICE VISIT (OUTPATIENT)
Dept: PHYSICAL THERAPY | Facility: MEDICAL CENTER | Age: 55
End: 2025-07-18
Payer: COMMERCIAL

## 2025-07-18 DIAGNOSIS — M79.671 RIGHT FOOT PAIN: ICD-10-CM

## 2025-07-18 DIAGNOSIS — M25.562 ACUTE PAIN OF LEFT KNEE: Primary | ICD-10-CM

## 2025-07-18 DIAGNOSIS — M76.71 PERONEAL TENDINITIS OF RIGHT LOWER EXTREMITY: ICD-10-CM

## 2025-07-18 DIAGNOSIS — M17.11 PRIMARY OSTEOARTHRITIS OF RIGHT KNEE: ICD-10-CM

## 2025-07-18 DIAGNOSIS — G89.29 CHRONIC PAIN OF RIGHT ANKLE: ICD-10-CM

## 2025-07-18 DIAGNOSIS — M25.571 CHRONIC PAIN OF RIGHT ANKLE: ICD-10-CM

## 2025-07-18 PROCEDURE — 97140 MANUAL THERAPY 1/> REGIONS: CPT | Performed by: PHYSICAL THERAPIST

## 2025-07-18 PROCEDURE — 97110 THERAPEUTIC EXERCISES: CPT | Performed by: PHYSICAL THERAPIST

## 2025-07-18 NOTE — PROGRESS NOTES
Daily Note     Today's date: 2025  Patient name: Emmanuel Bey  : 1970  MRN: [id number]  Referring provider: Jayme Murray DPM  Dx:   Encounter Diagnosis     ICD-10-CM    1. Acute pain of left knee  M25.562       2. Chronic pain of right ankle  M25.571     G89.29       3. Right foot pain  M79.671       4. Primary osteoarthritis of right knee  M17.11       5. Peroneal tendinitis of right lower extremity  M76.71                      Subjective: Patient states he is doing well.       Objective: See treatment diary below.      Assessment:  Patient demonstrates good tolerance to progressions c/ significantly reduced knee pain.  Tolerated treatment well. Patient would benefit from continued PT.      Plan: Continue per plan of care.      Precautions: None     Manuals       TC mobs AZ AZ AZ AZ AZ      Subtalar mobs AZ AZ AZ AZ AZ                 Neuro Re-Ed           Ankle 4 way            Quad sets  30x5s 30x5s 30x5s 30x5s                  Ther Ex           Bike 10' 10'  10' 10'      Ball roll outs           1/2 kneeling DF mobility           Standing g-s str           Heel slides 30x 30x 30x 30x 30x      Long sitting gastroc str  3x30s 3x30s 3x30s 3x30s 3x30s      Hamstring str EOT 3x30s 3x30s 3x30s 3x30s 3x30s      Prone quad str 3x30s 3x30s 3x30s 3x30s 3x30s      SLRx4 2x10 3x10 3x10 3x12 3x15                 Prone quad sets 3x10 5s 3x10 5s 3x10 5s 3x15 5s 3x15 5s                 Sup hip add 3x10 5s 3x10 5s 3x12 5s 3x12 5s 3x15 5s      Bridges c/ ball 2x10 3x10 3x12 3x12 3x15      Clams  3x10 3x10 3x12 3x12 3x15                            LP           TM walking           Ther Activity                                      Gait Training                                      Modalities            EPAT plantar fascia AZ AZ AZ AZ AZ                                                                                                               Pt refusing an IV or any blood work      [employer], RN  03/03/20 [id number]

## 2025-07-24 ENCOUNTER — OFFICE VISIT (OUTPATIENT)
Dept: PHYSICAL THERAPY | Facility: MEDICAL CENTER | Age: 55
End: 2025-07-24
Payer: COMMERCIAL

## 2025-07-24 DIAGNOSIS — M25.571 CHRONIC PAIN OF RIGHT ANKLE: ICD-10-CM

## 2025-07-24 DIAGNOSIS — M76.71 PERONEAL TENDINITIS OF RIGHT LOWER EXTREMITY: ICD-10-CM

## 2025-07-24 DIAGNOSIS — M25.562 ACUTE PAIN OF LEFT KNEE: Primary | ICD-10-CM

## 2025-07-24 DIAGNOSIS — M17.11 PRIMARY OSTEOARTHRITIS OF RIGHT KNEE: ICD-10-CM

## 2025-07-24 DIAGNOSIS — G89.29 CHRONIC PAIN OF RIGHT ANKLE: ICD-10-CM

## 2025-07-24 DIAGNOSIS — M79.671 RIGHT FOOT PAIN: ICD-10-CM

## 2025-07-24 PROCEDURE — 97110 THERAPEUTIC EXERCISES: CPT | Performed by: PHYSICAL THERAPIST

## 2025-07-24 PROCEDURE — 97140 MANUAL THERAPY 1/> REGIONS: CPT | Performed by: PHYSICAL THERAPIST

## 2025-07-24 NOTE — PROGRESS NOTES
Daily Note     Today's date: 2025  Patient name: Emmanuel Bey  : 1970  MRN: [id number]  Referring provider: Jayme Murray DPM  Dx:   Encounter Diagnosis     ICD-10-CM    1. Acute pain of left knee  M25.562       2. Chronic pain of right ankle  M25.571     G89.29       3. Right foot pain  M79.671       4. Primary osteoarthritis of right knee  M17.11       5. Peroneal tendinitis of right lower extremity  M76.71                      Subjective: Patient states he is doing well and is getting better.      Objective: See treatment diary below.      Assessment:  Patient presents c/ decreased knee pain and good ROM.  He did well c/ progressions.  Tolerated treatment well. Patient would benefit from continued PT      Plan: Continue per plan of care.      Precautions: None     Manuals      TC mobs AZ AZ AZ AZ AZ AZ     Subtalar mobs AZ AZ AZ AZ AZ AZ                Neuro Re-Ed           Ankle 4 way            Quad sets  30x5s 30x5s 30x5s 30x5s 30x5s                 Ther Ex           Bike 10' 10'  10' 10' 10'     Ball roll outs           1/2 kneeling DF mobility           Standing g-s str           Heel slides 30x 30x 30x 30x 30x 30x     Long sitting gastroc str  3x30s 3x30s 3x30s 3x30s 3x30s 3x30s     Hamstring str EOT 3x30s 3x30s 3x30s 3x30s 3x30s 3x30s     Prone quad str 3x30s 3x30s 3x30s 3x30s 3x30s 3x30s     SLRx4 2x10 3x10 3x10 3x12 3x15 2x10 2#                Prone quad sets 3x10 5s 3x10 5s 3x10 5s 3x15 5s 3x15 5s 3x10 2# 5s                Sup hip add 3x10 5s 3x10 5s 3x12 5s 3x12 5s 3x15 5s 3x15 5s     Bridges c/ ball 2x10 3x10 3x12 3x12 3x15 3x15     Clams  3x10 3x10 3x12 3x12 3x15 2x10 red                           LP           TM walking           Ther Activity                                      Gait Training                                      Modalities            EPAT plantar fascia AZ AZ AZ AZ AZ AZ

## 2025-07-25 ENCOUNTER — APPOINTMENT (OUTPATIENT)
Dept: PHYSICAL THERAPY | Facility: MEDICAL CENTER | Age: 55
End: 2025-07-25
Payer: COMMERCIAL

## 2025-07-26 ENCOUNTER — PATIENT MESSAGE (OUTPATIENT)
Dept: UROLOGY | Facility: CLINIC | Age: 55
End: 2025-07-26

## 2025-08-08 ENCOUNTER — OFFICE VISIT (OUTPATIENT)
Dept: PHYSICAL THERAPY | Facility: MEDICAL CENTER | Age: 55
End: 2025-08-08
Payer: COMMERCIAL

## 2025-08-08 DIAGNOSIS — M79.671 RIGHT FOOT PAIN: ICD-10-CM

## 2025-08-08 DIAGNOSIS — G89.29 CHRONIC PAIN OF RIGHT ANKLE: ICD-10-CM

## 2025-08-08 DIAGNOSIS — M25.571 CHRONIC PAIN OF RIGHT ANKLE: ICD-10-CM

## 2025-08-08 DIAGNOSIS — M17.12 PRIMARY OSTEOARTHRITIS OF LEFT KNEE: ICD-10-CM

## 2025-08-08 DIAGNOSIS — M76.71 PERONEAL TENDINITIS OF RIGHT LOWER EXTREMITY: ICD-10-CM

## 2025-08-08 DIAGNOSIS — M25.562 ACUTE PAIN OF LEFT KNEE: Primary | ICD-10-CM

## 2025-08-08 PROCEDURE — 97110 THERAPEUTIC EXERCISES: CPT | Performed by: PHYSICAL THERAPIST

## 2025-08-08 PROCEDURE — 97140 MANUAL THERAPY 1/> REGIONS: CPT | Performed by: PHYSICAL THERAPIST

## 2025-08-14 ENCOUNTER — OFFICE VISIT (OUTPATIENT)
Dept: PHYSICAL THERAPY | Facility: MEDICAL CENTER | Age: 55
End: 2025-08-14
Payer: COMMERCIAL

## 2025-08-22 ENCOUNTER — OFFICE VISIT (OUTPATIENT)
Dept: PHYSICAL THERAPY | Facility: MEDICAL CENTER | Age: 55
End: 2025-08-22
Payer: COMMERCIAL

## 2025-08-22 DIAGNOSIS — M25.571 CHRONIC PAIN OF RIGHT ANKLE: ICD-10-CM

## 2025-08-22 DIAGNOSIS — M17.12 PRIMARY OSTEOARTHRITIS OF LEFT KNEE: ICD-10-CM

## 2025-08-22 DIAGNOSIS — G89.29 CHRONIC PAIN OF RIGHT ANKLE: ICD-10-CM

## 2025-08-22 DIAGNOSIS — M79.671 RIGHT FOOT PAIN: ICD-10-CM

## 2025-08-22 DIAGNOSIS — M25.562 ACUTE PAIN OF LEFT KNEE: Primary | ICD-10-CM

## 2025-08-22 DIAGNOSIS — M17.11 PRIMARY OSTEOARTHRITIS OF RIGHT KNEE: ICD-10-CM

## 2025-08-22 DIAGNOSIS — M76.71 PERONEAL TENDINITIS OF RIGHT LOWER EXTREMITY: ICD-10-CM

## 2025-08-22 PROCEDURE — 97140 MANUAL THERAPY 1/> REGIONS: CPT | Performed by: PHYSICAL THERAPIST

## 2025-08-22 PROCEDURE — 97110 THERAPEUTIC EXERCISES: CPT | Performed by: PHYSICAL THERAPIST

## (undated) DEVICE — TIBURON HIP DRAPE WITH POUCHES: Brand: CONVERTORS

## (undated) DEVICE — 450 ML BOTTLE OF 0.05% CHLORHEXIDINE GLUCONATE IN 99.95% STERILE WATER FOR IRRIGATION, USP AND APPLICATOR.: Brand: IRRISEPT ANTIMICROBIAL WOUND LAVAGE

## (undated) DEVICE — SUT STRATAFIX SPIRAL PDS PLUS 1 CTX 18 IN SXPP1A400

## (undated) DEVICE — CAPIT HIP COP -CMNT/POR-ACTIS

## (undated) DEVICE — COBAN 6 IN STERILE

## (undated) DEVICE — GLOVE SRG BIOGEL ORTHOPEDIC 8.5

## (undated) DEVICE — GLOVE INDICATOR PI UNDERGLOVE SZ 8.5 BLUE

## (undated) DEVICE — SURGICAL GOWN, XL SMARTSLEEVE: Brand: CONVERTORS

## (undated) DEVICE — 4-PORT MANIFOLD: Brand: NEPTUNE 2

## (undated) DEVICE — BETHLEHEM TOTAL HIP, KIT: Brand: CARDINAL HEALTH

## (undated) DEVICE — CAPIT UPCHRG EMPHASYS ACETABULAR

## (undated) DEVICE — IMPERVIOUS STOCKINETTE: Brand: DEROYAL

## (undated) DEVICE — 3M™ STERI-DRAPE™ U-DRAPE 1015: Brand: STERI-DRAPE™

## (undated) DEVICE — SAW BLADE OSCILLATING BRAZOL 167

## (undated) DEVICE — SPECIMEN CONTAINER STERILE PEEL PACK

## (undated) DEVICE — HEAVY DUTY TABLE COVER: Brand: CONVERTORS

## (undated) DEVICE — DRESSING MEPILEX AG BORDER POST-OP 4 X 10 IN

## (undated) DEVICE — 3M™ IOBAN™ 2 ANTIMICROBIAL INCISE DRAPE 6648EZ: Brand: IOBAN™ 2

## (undated) DEVICE — INTENDED FOR TISSUE SEPARATION, AND OTHER PROCEDURES THAT REQUIRE A SHARP SURGICAL BLADE TO PUNCTURE OR CUT.: Brand: BARD-PARKER ® CARBON RIB-BACK BLADES

## (undated) DEVICE — SUT VICRYL 1 CT-1 27 IN J261H

## (undated) DEVICE — SYRINGE 50ML LL

## (undated) DEVICE — PLUMEPEN PRO 10FT

## (undated) DEVICE — ADHESIVE SKIN HIGH VISCOSITY EXOFIN 1ML

## (undated) DEVICE — HANDPIECE SET WITH RETRACTABLE COAXIAL FAN SPRAY TIP AND SUCTION TUBE: Brand: INTERPULSE

## (undated) DEVICE — GLOVE SRG BIOGEL 6.5

## (undated) DEVICE — SCD SEQUENTIAL COMPRESSION COMFORT SLEEVE MEDIUM KNEE LENGTH: Brand: KENDALL SCD

## (undated) DEVICE — GLOVE INDICATOR PI UNDERGLOVE SZ 6.5 BLUE

## (undated) DEVICE — HOOD: Brand: FLYTE, SURGICOOL

## (undated) DEVICE — NEEDLE 18 G X 1 1/2

## (undated) DEVICE — SUT VICRYL 2-0 CT-1 27 IN J259H

## (undated) DEVICE — SUT STRATAFIX SPIRAL PLUS 3-0 PS-2 30 X 30 CM SXMP2B408

## (undated) DEVICE — GLOVE SRG BIOGEL 8.5